# Patient Record
Sex: FEMALE | Race: WHITE | NOT HISPANIC OR LATINO | Employment: FULL TIME | URBAN - METROPOLITAN AREA
[De-identification: names, ages, dates, MRNs, and addresses within clinical notes are randomized per-mention and may not be internally consistent; named-entity substitution may affect disease eponyms.]

---

## 2017-02-13 ENCOUNTER — OFFICE VISIT - HEALTHEAST (OUTPATIENT)
Dept: SLEEP MEDICINE | Facility: CLINIC | Age: 32
End: 2017-02-13

## 2017-02-13 DIAGNOSIS — G47.10 HYPERSOMNIA, UNSPECIFIED: ICD-10-CM

## 2017-02-13 ASSESSMENT — MIFFLIN-ST. JEOR: SCORE: 1575.18

## 2017-07-05 ENCOUNTER — COMMUNICATION - HEALTHEAST (OUTPATIENT)
Dept: ADMINISTRATIVE | Facility: CLINIC | Age: 32
End: 2017-07-05

## 2017-07-13 ENCOUNTER — OFFICE VISIT - HEALTHEAST (OUTPATIENT)
Dept: MIDWIFE SERVICES | Facility: CLINIC | Age: 32
End: 2017-07-13

## 2017-07-13 DIAGNOSIS — N91.2 AMENORRHEA: ICD-10-CM

## 2017-07-13 DIAGNOSIS — Z00.00 HEALTH CARE MAINTENANCE: ICD-10-CM

## 2017-07-13 ASSESSMENT — MIFFLIN-ST. JEOR: SCORE: 1590.15

## 2017-07-14 ENCOUNTER — COMMUNICATION - HEALTHEAST (OUTPATIENT)
Dept: ADMINISTRATIVE | Facility: CLINIC | Age: 32
End: 2017-07-14

## 2017-07-26 ENCOUNTER — COMMUNICATION - HEALTHEAST (OUTPATIENT)
Dept: ADMINISTRATIVE | Facility: CLINIC | Age: 32
End: 2017-07-26

## 2017-07-26 DIAGNOSIS — N93.8 DUB (DYSFUNCTIONAL UTERINE BLEEDING): ICD-10-CM

## 2017-08-08 ENCOUNTER — COMMUNICATION - HEALTHEAST (OUTPATIENT)
Dept: MIDWIFE SERVICES | Facility: CLINIC | Age: 32
End: 2017-08-08

## 2017-10-04 ENCOUNTER — OFFICE VISIT - HEALTHEAST (OUTPATIENT)
Dept: MIDWIFE SERVICES | Facility: CLINIC | Age: 32
End: 2017-10-04

## 2017-10-04 DIAGNOSIS — N63.0 BREAST LUMP IN UPPER OUTER QUADRANT: ICD-10-CM

## 2017-10-04 DIAGNOSIS — F41.9 ANXIETY: ICD-10-CM

## 2017-10-04 ASSESSMENT — MIFFLIN-ST. JEOR: SCORE: 1609.2

## 2017-10-10 ENCOUNTER — HOSPITAL ENCOUNTER (OUTPATIENT)
Dept: MAMMOGRAPHY | Facility: CLINIC | Age: 32
Discharge: HOME | End: 2017-10-10
Attending: ADVANCED PRACTICE MIDWIFE

## 2017-10-10 ENCOUNTER — HOSPITAL ENCOUNTER (OUTPATIENT)
Dept: ULTRASOUND IMAGING | Facility: CLINIC | Age: 32
Discharge: HOME | End: 2017-10-10
Attending: ADVANCED PRACTICE MIDWIFE

## 2017-10-10 DIAGNOSIS — N63.0 BREAST LUMP IN UPPER OUTER QUADRANT: ICD-10-CM

## 2017-10-13 ENCOUNTER — COMMUNICATION - HEALTHEAST (OUTPATIENT)
Dept: MIDWIFE SERVICES | Facility: CLINIC | Age: 32
End: 2017-10-13

## 2017-12-03 ENCOUNTER — HEALTH MAINTENANCE LETTER (OUTPATIENT)
Age: 32
End: 2017-12-03

## 2018-01-24 ENCOUNTER — COMMUNICATION - HEALTHEAST (OUTPATIENT)
Dept: MIDWIFE SERVICES | Facility: CLINIC | Age: 33
End: 2018-01-24

## 2018-01-24 ENCOUNTER — COMMUNICATION - HEALTHEAST (OUTPATIENT)
Dept: ADMINISTRATIVE | Facility: CLINIC | Age: 33
End: 2018-01-24

## 2018-01-24 ENCOUNTER — AMBULATORY - HEALTHEAST (OUTPATIENT)
Dept: MIDWIFE SERVICES | Facility: CLINIC | Age: 33
End: 2018-01-24

## 2018-01-24 DIAGNOSIS — F41.9 ANXIETY: ICD-10-CM

## 2018-02-09 ENCOUNTER — OFFICE VISIT - HEALTHEAST (OUTPATIENT)
Dept: MIDWIFE SERVICES | Facility: CLINIC | Age: 33
End: 2018-02-09

## 2018-02-09 DIAGNOSIS — Z01.419 NORMAL GYNECOLOGIC EXAMINATION: ICD-10-CM

## 2018-02-09 DIAGNOSIS — N60.11 FIBROCYSTIC BREAST CHANGES OF BOTH BREASTS: ICD-10-CM

## 2018-02-09 DIAGNOSIS — F41.9 ANXIETY: ICD-10-CM

## 2018-02-09 DIAGNOSIS — N60.12 FIBROCYSTIC BREAST CHANGES OF BOTH BREASTS: ICD-10-CM

## 2018-02-09 ASSESSMENT — MIFFLIN-ST. JEOR: SCORE: 1596.96

## 2019-02-14 ENCOUNTER — OFFICE VISIT - HEALTHEAST (OUTPATIENT)
Dept: MIDWIFE SERVICES | Facility: CLINIC | Age: 34
End: 2019-02-14

## 2019-02-14 DIAGNOSIS — N92.0 MENORRHAGIA WITH REGULAR CYCLE: ICD-10-CM

## 2019-02-14 DIAGNOSIS — Z01.419 WELL WOMAN EXAM: ICD-10-CM

## 2019-02-14 DIAGNOSIS — F41.9 ANXIETY: ICD-10-CM

## 2019-02-14 DIAGNOSIS — Z12.4 CERVICAL CANCER SCREENING: ICD-10-CM

## 2019-02-14 ASSESSMENT — MIFFLIN-ST. JEOR: SCORE: 1631.88

## 2019-02-15 ENCOUNTER — AMBULATORY - HEALTHEAST (OUTPATIENT)
Dept: LAB | Facility: CLINIC | Age: 34
End: 2019-02-15

## 2019-02-15 DIAGNOSIS — N92.0 MENORRHAGIA WITH REGULAR CYCLE: ICD-10-CM

## 2019-02-15 LAB
HGB BLD-MCNC: 12.3 G/DL (ref 12–16)
HPV SOURCE: NORMAL
HUMAN PAPILLOMA VIRUS 16 DNA: NEGATIVE
HUMAN PAPILLOMA VIRUS 18 DNA: NEGATIVE
HUMAN PAPILLOMA VIRUS FINAL DIAGNOSIS: NORMAL
HUMAN PAPILLOMA VIRUS OTHER HR: NEGATIVE
SPECIMEN DESCRIPTION: NORMAL
TSH SERPL DL<=0.005 MIU/L-ACNC: 1.99 UIU/ML (ref 0.3–5)

## 2019-02-22 LAB
BKR LAB AP ABNORMAL BLEEDING: NO
BKR LAB AP BIRTH CONTROL/HORMONES: NORMAL
BKR LAB AP CERVICAL APPEARANCE: NORMAL
BKR LAB AP GYN ADEQUACY: NORMAL
BKR LAB AP GYN INTERPRETATION: NORMAL
BKR LAB AP HPV REFLEX: NORMAL
BKR LAB AP LMP: NORMAL
BKR LAB AP PATIENT STATUS: NORMAL
BKR LAB AP PREVIOUS ABNORMAL: NO
BKR LAB AP PREVIOUS NORMAL: NORMAL
HIGH RISK?: NO
PATH REPORT.COMMENTS IMP SPEC: NORMAL
RESULT FLAG (HE HISTORICAL CONVERSION): NORMAL

## 2019-03-06 ENCOUNTER — AMBULATORY - HEALTHEAST (OUTPATIENT)
Dept: MIDWIFE SERVICES | Facility: CLINIC | Age: 34
End: 2019-03-06

## 2019-03-06 ENCOUNTER — COMMUNICATION - HEALTHEAST (OUTPATIENT)
Dept: ADMINISTRATIVE | Facility: CLINIC | Age: 34
End: 2019-03-06

## 2019-03-06 DIAGNOSIS — F41.9 ANXIETY: ICD-10-CM

## 2019-03-06 DIAGNOSIS — F41.1 GAD (GENERALIZED ANXIETY DISORDER): ICD-10-CM

## 2019-03-19 ENCOUNTER — COMMUNICATION - HEALTHEAST (OUTPATIENT)
Dept: ADMINISTRATIVE | Facility: CLINIC | Age: 34
End: 2019-03-19

## 2019-06-19 ENCOUNTER — COMMUNICATION - HEALTHEAST (OUTPATIENT)
Dept: ADMINISTRATIVE | Facility: CLINIC | Age: 34
End: 2019-06-19

## 2019-06-19 DIAGNOSIS — Z00.00 HEALTH CARE MAINTENANCE: ICD-10-CM

## 2019-06-21 ENCOUNTER — AMBULATORY - HEALTHEAST (OUTPATIENT)
Dept: LAB | Facility: CLINIC | Age: 34
End: 2019-06-21

## 2019-06-21 DIAGNOSIS — Z00.00 HEALTH CARE MAINTENANCE: ICD-10-CM

## 2019-06-24 LAB
MEV IGG SER IA-ACNC: POSITIVE
MUV IGG SER QL IA: POSITIVE
RUBV IGG SERPL QL IA: POSITIVE

## 2019-06-25 ENCOUNTER — COMMUNICATION - HEALTHEAST (OUTPATIENT)
Dept: OBGYN | Facility: CLINIC | Age: 34
End: 2019-06-25

## 2020-01-06 ENCOUNTER — OFFICE VISIT - HEALTHEAST (OUTPATIENT)
Dept: MIDWIFE SERVICES | Facility: CLINIC | Age: 35
End: 2020-01-06

## 2020-01-06 DIAGNOSIS — Z01.812 PRE-PROCEDURE LAB EXAM: ICD-10-CM

## 2020-01-06 DIAGNOSIS — Z30.430 ENCOUNTER FOR IUD INSERTION: ICD-10-CM

## 2020-01-06 LAB — HCG UR QL: NEGATIVE

## 2020-01-06 ASSESSMENT — MIFFLIN-ST. JEOR: SCORE: 1654.56

## 2020-01-07 LAB
C TRACH DNA SPEC QL PROBE+SIG AMP: NEGATIVE
N GONORRHOEA DNA SPEC QL NAA+PROBE: NEGATIVE

## 2020-02-03 ENCOUNTER — OFFICE VISIT - HEALTHEAST (OUTPATIENT)
Dept: MIDWIFE SERVICES | Facility: CLINIC | Age: 35
End: 2020-02-03

## 2020-02-03 DIAGNOSIS — Z97.5 BREAKTHROUGH BLEEDING ASSOCIATED WITH INTRAUTERINE DEVICE (IUD): ICD-10-CM

## 2020-02-03 DIAGNOSIS — N92.1 BREAKTHROUGH BLEEDING ASSOCIATED WITH INTRAUTERINE DEVICE (IUD): ICD-10-CM

## 2020-02-03 DIAGNOSIS — Z30.431 INTRAUTERINE DEVICE SURVEILLANCE: ICD-10-CM

## 2020-02-03 ASSESSMENT — MIFFLIN-ST. JEOR: SCORE: 1654.56

## 2020-02-17 ENCOUNTER — HOSPITAL ENCOUNTER (OUTPATIENT)
Dept: ULTRASOUND IMAGING | Facility: HOSPITAL | Age: 35
Discharge: HOME | End: 2020-02-17
Attending: ADVANCED PRACTICE MIDWIFE

## 2020-02-17 DIAGNOSIS — N92.1 BREAKTHROUGH BLEEDING ASSOCIATED WITH INTRAUTERINE DEVICE (IUD): ICD-10-CM

## 2020-02-17 DIAGNOSIS — Z97.5 BREAKTHROUGH BLEEDING ASSOCIATED WITH INTRAUTERINE DEVICE (IUD): ICD-10-CM

## 2020-02-20 ENCOUNTER — OFFICE VISIT - HEALTHEAST (OUTPATIENT)
Dept: MIDWIFE SERVICES | Facility: CLINIC | Age: 35
End: 2020-02-20

## 2020-02-20 ENCOUNTER — COMMUNICATION - HEALTHEAST (OUTPATIENT)
Dept: MIDWIFE SERVICES | Facility: CLINIC | Age: 35
End: 2020-02-20

## 2020-02-20 DIAGNOSIS — F41.1 GAD (GENERALIZED ANXIETY DISORDER): ICD-10-CM

## 2020-02-20 DIAGNOSIS — Z01.419 WELL WOMAN EXAM: ICD-10-CM

## 2020-02-20 DIAGNOSIS — F41.9 ANXIETY: ICD-10-CM

## 2020-02-20 DIAGNOSIS — Z30.431 ENCOUNTER FOR ROUTINE CHECKING OF INTRAUTERINE CONTRACEPTIVE DEVICE (IUD): ICD-10-CM

## 2020-02-20 ASSESSMENT — MIFFLIN-ST. JEOR: SCORE: 1672.71

## 2020-03-02 ENCOUNTER — HEALTH MAINTENANCE LETTER (OUTPATIENT)
Age: 35
End: 2020-03-02

## 2020-05-26 ENCOUNTER — COMMUNICATION - HEALTHEAST (OUTPATIENT)
Dept: ADMINISTRATIVE | Facility: CLINIC | Age: 35
End: 2020-05-26

## 2020-06-23 ENCOUNTER — OFFICE VISIT - HEALTHEAST (OUTPATIENT)
Dept: MIDWIFE SERVICES | Facility: CLINIC | Age: 35
End: 2020-06-23

## 2020-06-23 DIAGNOSIS — N89.8 VAGINAL ODOR: ICD-10-CM

## 2020-06-23 DIAGNOSIS — Z30.431 IUD CHECK UP: ICD-10-CM

## 2020-06-23 LAB
CLUE CELLS: NORMAL
TRICHOMONAS, WET PREP: NORMAL
YEAST, WET PREP: NORMAL

## 2020-06-23 ASSESSMENT — MIFFLIN-ST. JEOR: SCORE: 1672.71

## 2020-12-20 ENCOUNTER — HEALTH MAINTENANCE LETTER (OUTPATIENT)
Age: 35
End: 2020-12-20

## 2021-01-22 ENCOUNTER — AMBULATORY - HEALTHEAST (OUTPATIENT)
Dept: NURSING | Facility: CLINIC | Age: 36
End: 2021-01-22

## 2021-02-10 ENCOUNTER — COMMUNICATION - HEALTHEAST (OUTPATIENT)
Dept: MIDWIFE SERVICES | Facility: CLINIC | Age: 36
End: 2021-02-10

## 2021-02-12 ENCOUNTER — AMBULATORY - HEALTHEAST (OUTPATIENT)
Dept: NURSING | Facility: CLINIC | Age: 36
End: 2021-02-12

## 2021-03-01 ENCOUNTER — OFFICE VISIT - HEALTHEAST (OUTPATIENT)
Dept: OTOLARYNGOLOGY | Facility: CLINIC | Age: 36
End: 2021-03-01

## 2021-03-01 DIAGNOSIS — Z87.898 HISTORY OF CHRONIC COUGH: ICD-10-CM

## 2021-03-16 ENCOUNTER — TRANSCRIBE ORDERS (OUTPATIENT)
Dept: OTOLARYNGOLOGY | Facility: CLINIC | Age: 36
End: 2021-03-16

## 2021-03-16 ENCOUNTER — AMBULATORY - HEALTHEAST (OUTPATIENT)
Dept: LAB | Facility: CLINIC | Age: 36
End: 2021-03-16

## 2021-03-16 DIAGNOSIS — Z87.898 HISTORY OF CHRONIC COUGH: Primary | ICD-10-CM

## 2021-03-24 ENCOUNTER — OFFICE VISIT - HEALTHEAST (OUTPATIENT)
Dept: FAMILY MEDICINE | Facility: CLINIC | Age: 36
End: 2021-03-24

## 2021-03-24 ENCOUNTER — RECORDS - HEALTHEAST (OUTPATIENT)
Dept: GENERAL RADIOLOGY | Facility: CLINIC | Age: 36
End: 2021-03-24

## 2021-03-24 DIAGNOSIS — T78.1XXD OTHER ADVERSE FOOD REACTIONS, NOT ELSEWHERE CLASSIFIED, SUBSEQUENT ENCOUNTER: ICD-10-CM

## 2021-03-24 DIAGNOSIS — Z00.00 ROUTINE GENERAL MEDICAL EXAMINATION AT A HEALTH CARE FACILITY: ICD-10-CM

## 2021-03-24 DIAGNOSIS — Z12.4 SCREENING FOR MALIGNANT NEOPLASM OF CERVIX: ICD-10-CM

## 2021-03-24 DIAGNOSIS — Z13.220 LIPID SCREENING: ICD-10-CM

## 2021-03-24 DIAGNOSIS — Z97.5 BREAKTHROUGH BLEEDING ASSOCIATED WITH INTRAUTERINE DEVICE (IUD): ICD-10-CM

## 2021-03-24 DIAGNOSIS — E66.811 CLASS 1 OBESITY WITHOUT SERIOUS COMORBIDITY WITH BODY MASS INDEX (BMI) OF 33.0 TO 33.9 IN ADULT, UNSPECIFIED OBESITY TYPE: ICD-10-CM

## 2021-03-24 DIAGNOSIS — N92.1 BREAKTHROUGH BLEEDING ASSOCIATED WITH INTRAUTERINE DEVICE (IUD): ICD-10-CM

## 2021-03-24 LAB
ALBUMIN SERPL-MCNC: 3.8 G/DL (ref 3.5–5)
ALP SERPL-CCNC: 55 U/L (ref 45–120)
ALT SERPL W P-5'-P-CCNC: 11 U/L (ref 0–45)
ANION GAP SERPL CALCULATED.3IONS-SCNC: 8 MMOL/L (ref 5–18)
AST SERPL W P-5'-P-CCNC: 19 U/L (ref 0–40)
BILIRUB SERPL-MCNC: 0.5 MG/DL (ref 0–1)
BUN SERPL-MCNC: 11 MG/DL (ref 8–22)
CALCIUM SERPL-MCNC: 8.7 MG/DL (ref 8.5–10.5)
CHLORIDE BLD-SCNC: 108 MMOL/L (ref 98–107)
CHOLEST SERPL-MCNC: 151 MG/DL
CLUE CELLS: NORMAL
CO2 SERPL-SCNC: 24 MMOL/L (ref 22–31)
CREAT SERPL-MCNC: 0.75 MG/DL (ref 0.6–1.1)
ERYTHROCYTE [DISTWIDTH] IN BLOOD BY AUTOMATED COUNT: 12.3 % (ref 11–14.5)
FASTING STATUS PATIENT QL REPORTED: NO
GFR SERPL CREATININE-BSD FRML MDRD: >60 ML/MIN/1.73M2
GLUCOSE BLD-MCNC: 73 MG/DL (ref 70–125)
HCT VFR BLD AUTO: 42.1 % (ref 35–47)
HDLC SERPL-MCNC: 51 MG/DL
HGB BLD-MCNC: 13.9 G/DL (ref 12–16)
LDLC SERPL CALC-MCNC: 86 MG/DL
MCH RBC QN AUTO: 31.6 PG (ref 27–34)
MCHC RBC AUTO-ENTMCNC: 33 G/DL (ref 32–36)
MCV RBC AUTO: 96 FL (ref 80–100)
PLATELET # BLD AUTO: 212 THOU/UL (ref 140–440)
PMV BLD AUTO: 9.9 FL (ref 7–10)
POTASSIUM BLD-SCNC: 4.2 MMOL/L (ref 3.5–5)
PROT SERPL-MCNC: 6.6 G/DL (ref 6–8)
RBC # BLD AUTO: 4.4 MILL/UL (ref 3.8–5.4)
SODIUM SERPL-SCNC: 140 MMOL/L (ref 136–145)
TRICHOMONAS, WET PREP: NORMAL
TRIGL SERPL-MCNC: 71 MG/DL
WBC: 5.2 THOU/UL (ref 4–11)
YEAST, WET PREP: NORMAL

## 2021-03-24 ASSESSMENT — MIFFLIN-ST. JEOR: SCORE: 1690.85

## 2021-03-25 LAB
HPV SOURCE: NORMAL
HUMAN PAPILLOMA VIRUS 16 DNA: NEGATIVE
HUMAN PAPILLOMA VIRUS 18 DNA: NEGATIVE
HUMAN PAPILLOMA VIRUS FINAL DIAGNOSIS: NORMAL
HUMAN PAPILLOMA VIRUS OTHER HR: NEGATIVE
SPECIMEN DESCRIPTION: NORMAL

## 2021-03-30 ENCOUNTER — COMMUNICATION - HEALTHEAST (OUTPATIENT)
Dept: FAMILY MEDICINE | Facility: CLINIC | Age: 36
End: 2021-03-30

## 2021-04-01 ENCOUNTER — COMMUNICATION - HEALTHEAST (OUTPATIENT)
Dept: FAMILY MEDICINE | Facility: CLINIC | Age: 36
End: 2021-04-01

## 2021-04-01 LAB
BKR LAB AP ABNORMAL BLEEDING: YES
BKR LAB AP BIRTH CONTROL/HORMONES: NORMAL
BKR LAB AP CERVICAL APPEARANCE: NORMAL
BKR LAB AP GYN ADEQUACY: NORMAL
BKR LAB AP GYN INTERPRETATION: NORMAL
BKR LAB AP HPV REFLEX: NORMAL
BKR LAB AP LMP: NORMAL
BKR LAB AP PATIENT STATUS: NORMAL
BKR LAB AP PREVIOUS ABNORMAL: NO
BKR LAB AP PREVIOUS NORMAL: 2019
HIGH RISK?: NO
PATH REPORT.COMMENTS IMP SPEC: NORMAL
RESULT FLAG (HE HISTORICAL CONVERSION): NORMAL

## 2021-04-06 NOTE — TELEPHONE ENCOUNTER
REFERRAL INFORMATION:    Referring Provider:  Dr. Davis Goldsmith    Referring Clinic:  [facility]    Reason for Visit/Diagnosis: GERD     FUTURE VISIT INFORMATION:    Appointment Date: 4/27/2021    Appointment Time: Noon     NOTES STATUS DETAILS   OFFICE NOTE from Referring Provider In process [facility]   OFFICE NOTE from Other Specialist Care Everywhere [facility]:  3/1/21 - ENT OV with Dr. Goldsmith    [facility]:  12/17/16 - UC OV with Dr. Cherry   [facility] DISCHARGE SUMMARY/  ED VISITS In Process [facility]:  3/13/16 - ED OV with PADMINI Rick   OPERATIVE REPORT In process    MEDICATION LIST Care Everywhere         ENDOSCOPY  In process    COLONOSCOPY In process    ERCP In process    EUS In process    STOOL TESTING In process    PERTINENT LABS In process    PATHOLOGY REPORTS (RELATED) In process    IMAGING (CT, MRI, EGD, MRCP, Small Bowel Follow Through/SBT, MR/CT Enterography) In process      Records Requested  04/06/21    Facility  [facility]  Fax: [phone]   Outcome * 4/6/21 4:37 PM Faxed req to [facility] for records to be faxed to clinic. - Kirsten     4/21/2021 9:25am Fax request sent to [facility] ([phone]) for med recs. -Jacinta     4/26/2021 11:46am Fax request sent to [facility] for med recs. Jerad

## 2021-04-15 ENCOUNTER — COMMUNICATION - HEALTHEAST (OUTPATIENT)
Dept: FAMILY MEDICINE | Facility: CLINIC | Age: 36
End: 2021-04-15

## 2021-04-15 DIAGNOSIS — N92.1 BREAKTHROUGH BLEEDING ASSOCIATED WITH INTRAUTERINE DEVICE (IUD): ICD-10-CM

## 2021-04-15 DIAGNOSIS — Z97.5 BREAKTHROUGH BLEEDING ASSOCIATED WITH INTRAUTERINE DEVICE (IUD): ICD-10-CM

## 2021-04-24 ENCOUNTER — HEALTH MAINTENANCE LETTER (OUTPATIENT)
Age: 36
End: 2021-04-24

## 2021-04-27 ENCOUNTER — VIRTUAL VISIT (OUTPATIENT)
Dept: GASTROENTEROLOGY | Facility: CLINIC | Age: 36
End: 2021-04-27
Payer: COMMERCIAL

## 2021-04-27 ENCOUNTER — PRE VISIT (OUTPATIENT)
Dept: GASTROENTEROLOGY | Facility: CLINIC | Age: 36
End: 2021-04-27

## 2021-04-27 VITALS — BODY MASS INDEX: 31.39 KG/M2 | WEIGHT: 200 LBS | HEIGHT: 67 IN

## 2021-04-27 DIAGNOSIS — K21.9 GASTROESOPHAGEAL REFLUX DISEASE, UNSPECIFIED WHETHER ESOPHAGITIS PRESENT: ICD-10-CM

## 2021-04-27 DIAGNOSIS — R10.13 ABDOMINAL PAIN, EPIGASTRIC: ICD-10-CM

## 2021-04-27 DIAGNOSIS — R19.5 LOOSE STOOLS: Primary | ICD-10-CM

## 2021-04-27 DIAGNOSIS — Z87.898 HISTORY OF CHRONIC COUGH: ICD-10-CM

## 2021-04-27 PROCEDURE — 99204 OFFICE O/P NEW MOD 45 MIN: CPT | Mod: 95 | Performed by: PHYSICIAN ASSISTANT

## 2021-04-27 ASSESSMENT — PAIN SCALES - GENERAL: PAINLEVEL: NO PAIN (0)

## 2021-04-27 ASSESSMENT — MIFFLIN-ST. JEOR: SCORE: 1639.82

## 2021-04-27 NOTE — LETTER
4/27/2021         RE: Komal Parra  [address] [address]        Dear Colleague,    Thank you for referring your patient, Komal Parra, to the [facility] [place]. Please see a copy of my visit note below.    GI CLINIC VISIT    CC/REFERRING MD:  Referred Self  REASON FOR CONSULTATION: chronic cough    ASSESSMENT/PLAN:  1. Chronic cough   Patient reports 2 years of chronic cough certain foods including lettuce, certain types of wheat.  Has been seen by ENT and allergy without a source of symptoms.  Does have significant family history of GERD and father with esophageal cancer.  Has done a 2-month trial of PPI without improvement in symptoms.  Would recommend evaluation with EGD with biopsies for eosinophilic esophagitis.  Can also have the Bravo procedure to evaluate for presence of GERD.      Given longstanding history of loose stools and abdominal discomfort, would recommend biopsies for H. pylori and celiac disease.  --Upper endoscopy with Bravo procedure    RTC 6-8 weeks     Thank you for this consultation.  It was a pleasure to participate in the care of this patient; please contact us with any further questions.     45 Minutes was spent on the date of the encounter during chart review, history and exam, documentation, and further activities as noted       Stephanie Hernandez PA-C  Division of Gastroenterology, Hepatology & Nutrition  [facility]      HPI  Komal Parra is a 35 year old presenting for chronic cough. Patient reports a chronic cough for 2 years. Symptoms do seem to be getting worse with time.     She has previously been seen by an allergist ([facility] in [place]- gluten, lettuce, preservative allergies were negative) and an ENT, a dietitian also suggested yeast overgrowth. Also notes lettuce seems to be a trigger. She notes that symptoms went away for a while (early April for 2 weeks), but that they came  "back \"with a vengence\". Symptoms seem to be year round. She also describes sensation of \"lump in her throat\" when she swallows that feels \"mucusy\". She states this is dry cough- and can last for hours after eating. When she coughs, she does not necessarily feal better but she always has this urge to cough. She has been on PPI with pantoprazole which did not help after two months, Flonase did not help. She does get some GERD symptoms related to food triggers. She occasionally will have dysphagia to solid foods if she does not chew properly. No dysphagia to liquids.  Foods that are high in wheat seem to trigger symptoms. She reports that she can typically tolerate white bread, but what bread can make things worse.     She notes that her dad had really severe GERD and diet of esophageal cancer, her sister had severe GERD as well but this improved.     She does note SOB- and feels like she cannot breathe. She will try taking an inhaler- does not make much of a difference. She is able to run without coughing, but with other types of workouts can trigger symptoms.     Notes IBS- cannot eat fried foods, has had this for 20 years. In the beginning of April she was constipated, and she was not coughing during this time. Can be up to four times a day. Will have urgency with bowel movements.     ROS:    No fevers or chills  No weight loss  No blurry vision, double vision or change in vision  No sore throat  No lymphadenopathy  No headache, paraesthesias, or weakness in a limb  + shortness of breath or wheezing  No chest pain or pressure  No arthralgias or myalgias  No rashes or skin changes + bruise easily   +  dysphagia  No BRBPR, hematochezia, melena  No dysuria, frequency or urgency  No hot/cold intolerance or polyria  + anxiety    PROBLEM LIST  Patient Active Problem List    Diagnosis Date Noted     Indication for care in labor or delivery 01/17/2014     Priority: Medium       PERTINENT PAST MEDICAL HISTORY:  Anxiety "     PREVIOUS SURGERIES:  Past Surgical History:   Procedure Laterality Date     HC TOOTH EXTRACTION W/FORCEP  2002       PREVIOUS ENDOSCOPY:  None     ALLERGIES:     Allergies   Allergen Reactions     Ibuprofen GI Disturbance       PERTINENT MEDICATIONS:    Current Outpatient Medications:      CITALOPRAM HYDROBROMIDE PO, Take 20 mg by mouth, Disp: , Rfl:      Prenatal Vit-Fe Fumarate-FA (PRENATAL MULTIVITAMIN  PLUS IRON) 27-0.8 MG TABS, Take 1 tablet by mouth daily, Disp: , Rfl:    Avoids NSAIDs     SOCIAL HISTORY:  No alcohol use or drug use   Social History     Socioeconomic History     Marital status:      Spouse name: Not on file     Number of children: Not on file     Years of education: Not on file     Highest education level: Not on file   Occupational History     Not on file   Social Needs     Financial resource strain: Not on file     Food insecurity     Worry: Not on file     Inability: Not on file     Transportation needs     Medical: Not on file     Non-medical: Not on file   Tobacco Use     Smoking status: Never Smoker     Smokeless tobacco: Never Used   Substance and Sexual Activity     Alcohol use: No     Drug use: No     Sexual activity: Yes     Partners: Male     Birth control/protection: None   Lifestyle     Physical activity     Days per week: Not on file     Minutes per session: Not on file     Stress: Not on file   Relationships     Social connections     Talks on phone: Not on file     Gets together: Not on file     Attends Zoroastrianism service: Not on file     Active member of club or organization: Not on file     Attends meetings of clubs or organizations: Not on file     Relationship status: Not on file     Intimate partner violence     Fear of current or ex partner: Not on file     Emotionally abused: Not on file     Physically abused: Not on file     Forced sexual activity: Not on file   Other Topics Concern     Parent/sibling w/ CABG, MI or angioplasty before 65F 55M? Not Asked  "  Social History Narrative     Not on file       FAMILY HISTORY:  FH of CRC: none   Mother with pre-cancerous polyps   FH of IBD: none   Dad with bad reflux and esophageal cancer     Past/family/social history reviewed and no changes    PHYSICAL EXAMINATION:  General appearance: Healthy appearing adult, in no acute distress  Eyes: Sclera anicteric  Ears, nose, mouth and throat: No obvious external lesions of ears and nose, Hearing intact  Neck: Symmetric, No obvious external lesions  Respiratory: Normal respiration, no use of accessory muscles   Skin: No rashes or jaundice   Psychiatric: Oriented to person, place and time, Appropriate mood and affect.     PERTINENT STUDIES:  No results found for any previous visit.           Komal Parra is a 35 year old female who is being evaluated via a billable video visit.      The patient has been notified of following:     \"This video visit will be conducted via a call between you and your physician/provider. We have found that certain health care needs can be provided without the need for an in-person physical exam.  This service lets us provide the care you need with a video conversation.  If a prescription is necessary we can send it directly to your pharmacy.  If lab work is needed we can place an order for that and you can then stop by our lab to have the test done at a later time.    If during the course of the call the physician/provider feels a video visit is not appropriate, you will not be charged for this service.\"     Patient confirmed that they are in [place] for today's visit Yes    Video-Visit Details  Type of service:  Video Visit    Video Start Time: 12:07 PM  Video End Time:  12:46 PM    Originating Location (pt. Location): Home    Distant Location (provider location):  [facility] [place]     Platform used: Foxtrot- patient's audio was not working, therefore the phone was used for audio today       Again, thank you " for allowing me to participate in the care of your patient.      Sincerely,    Stephanie Hernandez PA-C

## 2021-04-27 NOTE — PROGRESS NOTES
"Komal Parra is a 35 year old female who is being evaluated via a billable video visit.      The patient has been notified of following:     \"This video visit will be conducted via a call between you and your physician/provider. We have found that certain health care needs can be provided without the need for an in-person physical exam.  This service lets us provide the care you need with a video conversation.  If a prescription is necessary we can send it directly to your pharmacy.  If lab work is needed we can place an order for that and you can then stop by our lab to have the test done at a later time.    If during the course of the call the physician/provider feels a video visit is not appropriate, you will not be charged for this service.\"     Patient confirmed that they are in [place] for today's visit Yes    Video-Visit Details  Type of service:  Video Visit    Video Start Time: 12:07 PM  Video End Time:  12:46 PM    Originating Location (pt. Location): Home    Distant Location (provider location):  [facility] [place]     Platform used: [employer]- patient's audio was not working, therefore the phone was used for audio today             "

## 2021-04-27 NOTE — PROGRESS NOTES
"GI CLINIC VISIT    CC/REFERRING MD:  Referred Self  REASON FOR CONSULTATION: chronic cough    ASSESSMENT/PLAN:  1. Chronic cough   Patient reports 2 years of chronic cough certain foods including lettuce, certain types of wheat.  Has been seen by ENT and allergy without a source of symptoms.  Does have significant family history of GERD and father with esophageal cancer.  Has done a 2-month trial of PPI without improvement in symptoms.  Would recommend evaluation with EGD with biopsies for eosinophilic esophagitis.  Can also have the Bravo procedure to evaluate for presence of GERD.      Given longstanding history of loose stools and abdominal discomfort, would recommend biopsies for H. pylori and celiac disease.  --Upper endoscopy with Bravo procedure    RTC 6-8 weeks     Thank you for this consultation.  It was a pleasure to participate in the care of this patient; please contact us with any further questions.     45 Minutes was spent on the date of the encounter during chart review, history and exam, documentation, and further activities as noted       Stephanie Hernandez PA-C  Division of Gastroenterology, Hepatology & Nutrition  [facility]  Komal Parra is a 35 year old presenting for chronic cough. Patient reports a chronic cough for 2 years. Symptoms do seem to be getting worse with time.     She has previously been seen by an allergist ([facility] in [place]- gluten, lettuce, preservative allergies were negative) and an ENT, a dietitian also suggested yeast overgrowth. Also notes lettuce seems to be a trigger. She notes that symptoms went away for a while (early April for 2 weeks), but that they came back \"with a vengence\". Symptoms seem to be year round. She also describes sensation of \"lump in her throat\" when she swallows that feels \"mucusy\". She states this is dry cough- and can last for hours after eating. When she coughs, she does not necessarily feal better but she " always has this urge to cough. She has been on PPI with pantoprazole which did not help after two months, Flonase did not help. She does get some GERD symptoms related to food triggers. She occasionally will have dysphagia to solid foods if she does not chew properly. No dysphagia to liquids.  Foods that are high in wheat seem to trigger symptoms. She reports that she can typically tolerate white bread, but what bread can make things worse.     She notes that her dad had really severe GERD and diet of esophageal cancer, her sister had severe GERD as well but this improved.     She does note SOB- and feels like she cannot breathe. She will try taking an inhaler- does not make much of a difference. She is able to run without coughing, but with other types of workouts can trigger symptoms.     Notes IBS- cannot eat fried foods, has had this for 20 years. In the beginning of April she was constipated, and she was not coughing during this time. Can be up to four times a day. Will have urgency with bowel movements.     ROS:    No fevers or chills  No weight loss  No blurry vision, double vision or change in vision  No sore throat  No lymphadenopathy  No headache, paraesthesias, or weakness in a limb  + shortness of breath or wheezing  No chest pain or pressure  No arthralgias or myalgias  No rashes or skin changes + bruise easily   +  dysphagia  No BRBPR, hematochezia, melena  No dysuria, frequency or urgency  No hot/cold intolerance or polyria  + anxiety    PROBLEM LIST  Patient Active Problem List    Diagnosis Date Noted     Indication for care in labor or delivery 01/17/2014     Priority: Medium       PERTINENT PAST MEDICAL HISTORY:  Anxiety     PREVIOUS SURGERIES:  Past Surgical History:   Procedure Laterality Date      TOOTH EXTRACTION W/FORCEP  2002       PREVIOUS ENDOSCOPY:  None     ALLERGIES:     Allergies   Allergen Reactions     Ibuprofen GI Disturbance       PERTINENT MEDICATIONS:    Current Outpatient  Medications:      CITALOPRAM HYDROBROMIDE PO, Take 20 mg by mouth, Disp: , Rfl:      Prenatal Vit-Fe Fumarate-FA (PRENATAL MULTIVITAMIN  PLUS IRON) 27-0.8 MG TABS, Take 1 tablet by mouth daily, Disp: , Rfl:    Avoids NSAIDs     SOCIAL HISTORY:  No alcohol use or drug use   Social History     Socioeconomic History     Marital status:      Spouse name: Not on file     Number of children: Not on file     Years of education: Not on file     Highest education level: Not on file   Occupational History     Not on file   Social Needs     Financial resource strain: Not on file     Food insecurity     Worry: Not on file     Inability: Not on file     Transportation needs     Medical: Not on file     Non-medical: Not on file   Tobacco Use     Smoking status: Never Smoker     Smokeless tobacco: Never Used   Substance and Sexual Activity     Alcohol use: No     Drug use: No     Sexual activity: Yes     Partners: Male     Birth control/protection: None   Lifestyle     Physical activity     Days per week: Not on file     Minutes per session: Not on file     Stress: Not on file   Relationships     Social connections     Talks on phone: Not on file     Gets together: Not on file     Attends Yazdanism service: Not on file     Active member of club or organization: Not on file     Attends meetings of clubs or organizations: Not on file     Relationship status: Not on file     Intimate partner violence     Fear of current or ex partner: Not on file     Emotionally abused: Not on file     Physically abused: Not on file     Forced sexual activity: Not on file   Other Topics Concern     Parent/sibling w/ CABG, MI or angioplasty before 65F 55M? Not Asked   Social History Narrative     Not on file       FAMILY HISTORY:  FH of CRC: none   Mother with pre-cancerous polyps   FH of IBD: none   Dad with bad reflux and esophageal cancer     Past/family/social history reviewed and no changes    PHYSICAL EXAMINATION:  General appearance:  Healthy appearing adult, in no acute distress  Eyes: Sclera anicteric  Ears, nose, mouth and throat: No obvious external lesions of ears and nose, Hearing intact  Neck: Symmetric, No obvious external lesions  Respiratory: Normal respiration, no use of accessory muscles   Skin: No rashes or jaundice   Psychiatric: Oriented to person, place and time, Appropriate mood and affect.     PERTINENT STUDIES:  No results found for any previous visit.

## 2021-04-27 NOTE — NURSING NOTE
"Chief Complaint   Patient presents with     Consult     Virtual consult       Vitals:    04/27/21 1135   Weight: 90.7 kg (200 lb)   Height: 1.702 m (5' 7\")       Body mass index is 31.32 kg/m .      EVIN WelshT                      "

## 2021-04-27 NOTE — PATIENT INSTRUCTIONS
It was a pleasure taking care of you today.  I've included a brief summary of our discussion and care plan from today's visit below.  Please review this information with your primary care provider.  ______________________________________________________________________    My recommendations are summarized as follows:    -- upper endoscopy with BRAVO  -- please see scheduling information provided below     Return to GI Clinic in 6-8 weeks to review your progress.    ______________________________________________________________________    How do I schedule labs, imaging studies, or procedures that were ordered in clinic today?     Labs: To schedule lab appointment at the [facility], use my chart or call [phone]. If you have a [facility] lab closer to home where you are regularly seen you can give them a call.     Procedures: If a colonoscopy, upper endoscopy, breath test, esophageal manometry, or pH impedence was ordered today, our endoscopy team will call you to schedule this. If you have not heard from our endoscopy team within a week, please call [phone] to schedule.     Imaging Studies: If you were scheduled for a CT scan, X-ray, MRI, ultrasound, HIDA scan or other imaging study, please call [phone] to have this scheduled.     Referral: If a referral to another specialty was ordered, expect a phone call or follow instructions above. If you have not heard from anyone regarding your referral in a week, please call our clinic to check the status.     Who do I call with any questions after my visit?  Please be in touch if there are any further questions that arise following today's visit.  There are multiple ways to contact your gastroenterology care team.        During business hours, you may reach a Gastroenterology nurse at [phone]      To schedule or reschedule an appointment, please call [phone].       You can always send a secure message through [employer].  Pristones  are answered by your nurse or doctor typically within 24 hours.  Please allow extra time on weekends and holidays.        For urgent/emergent questions after business hours, you may reach the on-call GI Fellow by contacting the [facility]  at [phone].     How will I get the results of any tests ordered?    You will receive all of your results.  If you have signed up for [employer], any tests ordered at your visit will be available to you after your physician reviews them.  Typically this takes 1-2 weeks.  If there are urgent results that require a change in your care plan, your physician or nurse will call you to discuss the next steps.      What is Cerebrex?  Cerebrex is a secure way for you to access all of your healthcare records from the [facility].  It is a web based computer program, so you can sign on to it from any location.  It also allows you to send secure messages to your care team.  I recommend signing up for Cerebrex access if you have not already done so and are comfortable with using a computer.      How to I schedule a follow-up visit?  If you did not schedule a follow-up visit today, please call [phone] to schedule a follow-up office visit.      Sincerely,    Stephanie Hernandez PA-C  Division of Gastroenterology, Hepatology & Nutrition  [facility]

## 2021-05-05 ENCOUNTER — TELEPHONE (OUTPATIENT)
Dept: GASTROENTEROLOGY | Facility: OUTPATIENT CENTER | Age: 36
End: 2021-05-05

## 2021-05-05 NOTE — TELEPHONE ENCOUNTER
"Screening Questions  1. What insurance is in the chart? [employer]    2.  Ordering/Referring Provider: Stephanie Hernandez    3. BMI 5'7\"/200=31.3    4. Are you on daily home oxygen? n    5. Do you have a history of difficult airway? n    6. Have you had a heart, lung, or liver transplant? n    7. Have you had a stroke or Transient ischemic atttack (TIA) within 3 months? n    8. In the past year, have you had any heart related issues including cardiomyopathy or a MI within 6 months, that required cardiac stenting or other implantable devices? n    9. What type of implantable device do you have (any pacemaker, defib, LVAD)? n    10. Do you take nitroglycerin? If yes, how often? n    11. Are you currently taking any blood thinners?n    12. Are you a diabetic? n    13. (Females) Are you currently pregnant? N  If yes, how many weeks?    14. Have you had a procedure in the past that was difficult to tolerate with conscious sedation? Any allergies to Fentanyl or Versed N    15. Are you taking any scheduled prescription narcotics more than once daily? N    16. Do you have any chemical dependencies such as alcohol, street drugs, or methadone? N    17. Do you have any history of post-traumatic stress syndrome or mental health issues? ANXIETY    18. Do you transfer independently? y    19.  Do you have any issues with constipation? IVF BUT NOT REALLY CONSTIPATION    Scheduling Details    Procedure Scheduled: EGD W/ BRAVO  Provider/Surgeon: SALINA  Date of Procedure: 5/20  Location: [facility]  Caller (Please ask for phone number if not scheduled by patient): PATIENT      Sedation Type: MAC  Conscious Sedation- Needs  for 6 hours after the procedure  MAC/General-Needs  for 24 hours after procedure    Pre-op Required at [facility] and OR for  MAC sedation: N  (if yes advise patient they will need a pre-op prior to procedure)      Is patient on blood thinners? -N (If yes- inform patient to follow up with PCP or provider for " follow up instructions)     Informed patient they will need an adult  Y  Cannot take any type of public or medical transportation alone    Informed Patient of COVID Test Requirement Y-She will do it herself    Preferred Pharmacy for Pre Prescription     Confirmed Nurse will call to complete assessment     Additional comments:

## 2021-05-07 DIAGNOSIS — Z11.59 ENCOUNTER FOR SCREENING FOR OTHER VIRAL DISEASES: ICD-10-CM

## 2021-05-12 ENCOUNTER — TELEPHONE (OUTPATIENT)
Dept: GASTROENTEROLOGY | Facility: CLINIC | Age: 36
End: 2021-05-12

## 2021-05-12 NOTE — TELEPHONE ENCOUNTER
Writer reviewed pre-assessment questions with patient prior to upcoming EGD with Bravo on 5.20.2021.  COVID test scheduled for 5.17.2021 through [facility] at [address] [place] [address].    Pre-op:  5.17.2021 with Dr. Megan Mack at the [facility]    Reviewed EGD Bravo prep instructions with patient.      Designated  policy reviewed with patient.     Patient verbalized understanding.  No further questions or concerns.      Pt instructed to stop PPI 1 week prior to procedure; any antacids (Tums, Maalox) stop 3 days prior

## 2021-05-17 ENCOUNTER — AMBULATORY - HEALTHEAST (OUTPATIENT)
Dept: LAB | Facility: CLINIC | Age: 36
End: 2021-05-17

## 2021-05-17 ENCOUNTER — OFFICE VISIT - HEALTHEAST (OUTPATIENT)
Dept: FAMILY MEDICINE | Facility: CLINIC | Age: 36
End: 2021-05-17

## 2021-05-17 DIAGNOSIS — R05.3 CHRONIC COUGH: ICD-10-CM

## 2021-05-17 DIAGNOSIS — Z11.59 ENCOUNTER FOR SCREENING FOR OTHER VIRAL DISEASES: ICD-10-CM

## 2021-05-17 DIAGNOSIS — Z01.818 PREOP GENERAL PHYSICAL EXAM: ICD-10-CM

## 2021-05-17 DIAGNOSIS — E66.811 CLASS 1 OBESITY WITHOUT SERIOUS COMORBIDITY WITH BODY MASS INDEX (BMI) OF 33.0 TO 33.9 IN ADULT, UNSPECIFIED OBESITY TYPE: ICD-10-CM

## 2021-05-17 DIAGNOSIS — F41.9 ANXIETY: ICD-10-CM

## 2021-05-17 ASSESSMENT — MIFFLIN-ST. JEOR: SCORE: 1668.17

## 2021-05-18 ENCOUNTER — COMMUNICATION - HEALTHEAST (OUTPATIENT)
Dept: SCHEDULING | Facility: CLINIC | Age: 36
End: 2021-05-18

## 2021-05-18 LAB
SARS-COV-2 PCR COMMENT: NORMAL
SARS-COV-2 RNA SPEC QL NAA+PROBE: NEGATIVE
SARS-COV-2 VIRUS SPECIMEN SOURCE: NORMAL

## 2021-05-20 ENCOUNTER — ANESTHESIA EVENT (OUTPATIENT)
Dept: GASTROENTEROLOGY | Facility: CLINIC | Age: 36
End: 2021-05-20
Payer: COMMERCIAL

## 2021-05-20 ENCOUNTER — HOSPITAL ENCOUNTER (OUTPATIENT)
Facility: CLINIC | Age: 36
Discharge: HOME | End: 2021-05-20
Attending: INTERNAL MEDICINE | Admitting: INTERNAL MEDICINE
Payer: COMMERCIAL

## 2021-05-20 ENCOUNTER — ANESTHESIA (OUTPATIENT)
Dept: GASTROENTEROLOGY | Facility: CLINIC | Age: 36
End: 2021-05-20
Payer: COMMERCIAL

## 2021-05-20 VITALS
RESPIRATION RATE: 20 BRPM | HEIGHT: 67 IN | WEIGHT: 207.45 LBS | DIASTOLIC BLOOD PRESSURE: 68 MMHG | HEART RATE: 54 BPM | TEMPERATURE: 97.7 F | SYSTOLIC BLOOD PRESSURE: 118 MMHG | OXYGEN SATURATION: 98 % | BODY MASS INDEX: 32.56 KG/M2

## 2021-05-20 LAB — UPPER GI ENDOSCOPY: NORMAL

## 2021-05-20 PROCEDURE — 258N000003 HC RX IP 258 OP 636: Performed by: STUDENT IN AN ORGANIZED HEALTH CARE EDUCATION/TRAINING PROGRAM

## 2021-05-20 PROCEDURE — 250N000011 HC RX IP 250 OP 636: Performed by: NURSE ANESTHETIST, CERTIFIED REGISTERED

## 2021-05-20 PROCEDURE — 370N000017 HC ANESTHESIA TECHNICAL FEE, PER MIN: Performed by: INTERNAL MEDICINE

## 2021-05-20 PROCEDURE — 250N000009 HC RX 250: Performed by: NURSE ANESTHETIST, CERTIFIED REGISTERED

## 2021-05-20 PROCEDURE — 91035 G-ESOPH REFLX TST W/ELECTROD: CPT | Performed by: INTERNAL MEDICINE

## 2021-05-20 RX ORDER — NALOXONE HYDROCHLORIDE 0.4 MG/ML
0.4 INJECTION, SOLUTION INTRAMUSCULAR; INTRAVENOUS; SUBCUTANEOUS
Status: DISCONTINUED | OUTPATIENT
Start: 2021-05-20 | End: 2021-05-20 | Stop reason: HOSPADM

## 2021-05-20 RX ORDER — NALOXONE HYDROCHLORIDE 0.4 MG/ML
0.2 INJECTION, SOLUTION INTRAMUSCULAR; INTRAVENOUS; SUBCUTANEOUS
Status: DISCONTINUED | OUTPATIENT
Start: 2021-05-20 | End: 2021-05-20 | Stop reason: HOSPADM

## 2021-05-20 RX ORDER — ONDANSETRON 2 MG/ML
4 INJECTION INTRAMUSCULAR; INTRAVENOUS EVERY 6 HOURS PRN
Status: DISCONTINUED | OUTPATIENT
Start: 2021-05-20 | End: 2021-05-20 | Stop reason: HOSPADM

## 2021-05-20 RX ORDER — PROPOFOL 10 MG/ML
INJECTION, EMULSION INTRAVENOUS CONTINUOUS PRN
Status: DISCONTINUED | OUTPATIENT
Start: 2021-05-20 | End: 2021-05-20

## 2021-05-20 RX ORDER — ONDANSETRON 2 MG/ML
4 INJECTION INTRAMUSCULAR; INTRAVENOUS
Status: DISCONTINUED | OUTPATIENT
Start: 2021-05-20 | End: 2021-05-20 | Stop reason: HOSPADM

## 2021-05-20 RX ORDER — SODIUM CHLORIDE, SODIUM LACTATE, POTASSIUM CHLORIDE, CALCIUM CHLORIDE 600; 310; 30; 20 MG/100ML; MG/100ML; MG/100ML; MG/100ML
INJECTION, SOLUTION INTRAVENOUS CONTINUOUS
Status: DISCONTINUED | OUTPATIENT
Start: 2021-05-20 | End: 2021-05-20 | Stop reason: HOSPADM

## 2021-05-20 RX ORDER — FENTANYL CITRATE 50 UG/ML
25-50 INJECTION, SOLUTION INTRAMUSCULAR; INTRAVENOUS
Status: DISCONTINUED | OUTPATIENT
Start: 2021-05-20 | End: 2021-05-20 | Stop reason: HOSPADM

## 2021-05-20 RX ORDER — HYDROMORPHONE HYDROCHLORIDE 1 MG/ML
.3-.5 INJECTION, SOLUTION INTRAMUSCULAR; INTRAVENOUS; SUBCUTANEOUS EVERY 5 MIN PRN
Status: DISCONTINUED | OUTPATIENT
Start: 2021-05-20 | End: 2021-05-20 | Stop reason: HOSPADM

## 2021-05-20 RX ORDER — LIDOCAINE HYDROCHLORIDE 20 MG/ML
INJECTION, SOLUTION INFILTRATION; PERINEURAL PRN
Status: DISCONTINUED | OUTPATIENT
Start: 2021-05-20 | End: 2021-05-20

## 2021-05-20 RX ORDER — LIDOCAINE 40 MG/G
CREAM TOPICAL
Status: DISCONTINUED | OUTPATIENT
Start: 2021-05-20 | End: 2021-05-20 | Stop reason: HOSPADM

## 2021-05-20 RX ORDER — ONDANSETRON 2 MG/ML
4 INJECTION INTRAMUSCULAR; INTRAVENOUS EVERY 30 MIN PRN
Status: DISCONTINUED | OUTPATIENT
Start: 2021-05-20 | End: 2021-05-20 | Stop reason: HOSPADM

## 2021-05-20 RX ORDER — HYDRALAZINE HYDROCHLORIDE 20 MG/ML
2.5-5 INJECTION INTRAMUSCULAR; INTRAVENOUS EVERY 10 MIN PRN
Status: DISCONTINUED | OUTPATIENT
Start: 2021-05-20 | End: 2021-05-20 | Stop reason: HOSPADM

## 2021-05-20 RX ORDER — PROPOFOL 10 MG/ML
INJECTION, EMULSION INTRAVENOUS PRN
Status: DISCONTINUED | OUTPATIENT
Start: 2021-05-20 | End: 2021-05-20

## 2021-05-20 RX ORDER — ONDANSETRON 4 MG/1
4 TABLET, ORALLY DISINTEGRATING ORAL EVERY 30 MIN PRN
Status: DISCONTINUED | OUTPATIENT
Start: 2021-05-20 | End: 2021-05-20 | Stop reason: HOSPADM

## 2021-05-20 RX ORDER — FLUMAZENIL 0.1 MG/ML
0.2 INJECTION, SOLUTION INTRAVENOUS
Status: DISCONTINUED | OUTPATIENT
Start: 2021-05-20 | End: 2021-05-20 | Stop reason: HOSPADM

## 2021-05-20 RX ORDER — PROCHLORPERAZINE MALEATE 10 MG
10 TABLET ORAL EVERY 6 HOURS PRN
Status: DISCONTINUED | OUTPATIENT
Start: 2021-05-20 | End: 2021-05-20 | Stop reason: HOSPADM

## 2021-05-20 RX ORDER — ONDANSETRON 4 MG/1
4 TABLET, ORALLY DISINTEGRATING ORAL EVERY 6 HOURS PRN
Status: DISCONTINUED | OUTPATIENT
Start: 2021-05-20 | End: 2021-05-20 | Stop reason: HOSPADM

## 2021-05-20 RX ADMIN — LIDOCAINE HYDROCHLORIDE 60 MG: 20 INJECTION, SOLUTION INFILTRATION; PERINEURAL at 08:02

## 2021-05-20 RX ADMIN — SODIUM CHLORIDE, POTASSIUM CHLORIDE, SODIUM LACTATE AND CALCIUM CHLORIDE: 600; 310; 30; 20 INJECTION, SOLUTION INTRAVENOUS at 08:00

## 2021-05-20 RX ADMIN — PROPOFOL 15 MG: 10 INJECTION, EMULSION INTRAVENOUS at 08:06

## 2021-05-20 RX ADMIN — TOPICAL ANESTHETIC 1 EACH: 200 SPRAY DENTAL; PERIODONTAL at 08:01

## 2021-05-20 RX ADMIN — PROPOFOL 150 MCG/KG/MIN: 10 INJECTION, EMULSION INTRAVENOUS at 08:03

## 2021-05-20 RX ADMIN — PROPOFOL 10 MG: 10 INJECTION, EMULSION INTRAVENOUS at 08:09

## 2021-05-20 RX ADMIN — PROPOFOL 15 MG: 10 INJECTION, EMULSION INTRAVENOUS at 08:13

## 2021-05-20 RX ADMIN — PROPOFOL 15 MG: 10 INJECTION, EMULSION INTRAVENOUS at 08:10

## 2021-05-20 RX ADMIN — PROPOFOL 15 MG: 10 INJECTION, EMULSION INTRAVENOUS at 08:03

## 2021-05-20 ASSESSMENT — MIFFLIN-ST. JEOR: SCORE: 1673.63

## 2021-05-20 NOTE — OR NURSING
Pt and spouse were education on how to use and record Bravo data by endo nurse prior to procedure., They verbalized understanding of the instructions.

## 2021-05-20 NOTE — ANESTHESIA CARE TRANSFER NOTE
Patient: Komal Parra    Procedure(s):  ESOPHAGOGASTRODUODENOSCOPY, WITH BRAVO PH MONITORING DEVICE INSERTION    Diagnosis: Loose stools [R19.5]  History of chronic cough [Z87.09]  Abdominal pain, epigastric [R10.13]  Diagnosis Additional Information: No value filed.    Anesthesia Type:   MAC     Note:    Oropharynx: oropharynx clear of all foreign objects  Level of Consciousness: awake  Oxygen Supplementation: room air    Independent Airway: airway patency satisfactory and stable  Dentition: dentition unchanged  Vital Signs Stable: post-procedure vital signs reviewed and stable  Report to RN Given: handoff report given  Patient transferred to: Phase II    Handoff Report: Identifed the Patient, Identified the Reponsible Provider, Reviewed the pertinent medical history, Discussed the surgical course, Reviewed Intra-OP anesthesia mangement and issues during anesthesia, Set expectations for post-procedure period and Allowed opportunity for questions and acknowledgement of understanding      Vitals: (Last set prior to Anesthesia Care Transfer)  CRNA VITALS  5/20/2021 0755 - 5/20/2021 0828      5/20/2021             Pulse:  61    SpO2:  95 %    Resp Rate (observed):  16        Electronically Signed By: ERICK Lake CRNA  May 20, 2021  8:28 AM

## 2021-05-20 NOTE — OR NURSING
Informed Dr. Cleveland that patient states she is unable to void for hcg test. Pt also has IUD and does not believe that pregnancy is a possibility or concern. MD to talk with pt at bedside regarding test.

## 2021-05-20 NOTE — ANESTHESIA PREPROCEDURE EVALUATION
Anesthesia Pre-Procedure Evaluation    Patient: Komal Parra   MRN: [id number] : 1985        Preoperative Diagnosis: Loose stools [R19.5]  History of chronic cough [Z87.09]  Abdominal pain, epigastric [R10.13]   Procedure : Procedure(s):  ESOPHAGOGASTRODUODENOSCOPY, WITH BRAVO PH MONITORING DEVICE INSERTION     No past medical history on file.   Past Surgical History:   Procedure Laterality Date     HC TOOTH EXTRACTION W/FORCEP        Allergies   Allergen Reactions     Ibuprofen GI Disturbance      Social History     Tobacco Use     Smoking status: Never Smoker     Smokeless tobacco: Never Used   Substance Use Topics     Alcohol use: No      Wt Readings from Last 1 Encounters:   21 94.1 kg (207 lb 7.3 oz)        Anesthesia Evaluation   Pt has had prior anesthetic. Type: MAC.    No history of anesthetic complications       ROS/MED HX  ENT/Pulmonary:       Neurologic:       Cardiovascular:       METS/Exercise Tolerance:     Hematologic:       Musculoskeletal:       GI/Hepatic:     (+) GERD,     Renal/Genitourinary:       Endo:     (+) Obesity,     Psychiatric/Substance Use:     (+) psychiatric history anxiety     Infectious Disease:       Malignancy:       Other:            Physical Exam    Airway        Mallampati: I   TM distance: > 3 FB   Neck ROM: full   Mouth opening: > 3 cm    Respiratory Devices and Support         Dental  no notable dental history         Cardiovascular   cardiovascular exam normal          Pulmonary   pulmonary exam normal                OUTSIDE LABS:  CBC:   Lab Results   Component Value Date    HGB 12.6 2014     BMP: No results found for: NA, POTASSIUM, CHLORIDE, CO2, BUN, CR, GLC  COAGS: No results found for: PTT, INR, FIBR  POC: No results found for: BGM, HCG, HCGS  HEPATIC: No results found for: ALBUMIN, PROTTOTAL, ALT, AST, GGT, ALKPHOS, BILITOTAL, BILIDIRECT, ALEJANDRINA  OTHER: No results found for: PH, LACT, A1C, KERRI, PHOS, MAG, LIPASE, AMYLASE, TSH, T4, T3,  CRP, SED    Anesthesia Plan    ASA Status:  2   NPO Status:  NPO Appropriate    Anesthesia Type: MAC.     - Reason for MAC: straight local not clinically adequate   Induction: Intravenous.   Maintenance: TIVA.        Consents    Anesthesia Plan(s) and associated risks, benefits, and realistic alternatives discussed. Questions answered and patient/representative(s) expressed understanding.     - Discussed with:  Patient      - Extended Intubation/Ventilatory Support Discussed: No.      - Patient is DNR/DNI Status: No    Use of blood products discussed: No .     Postoperative Care    Pain management: IV analgesics, Oral pain medications.   PONV prophylaxis: Ondansetron (or other 5HT-3)     Comments:                Robinson Cleveland MD

## 2021-05-20 NOTE — ANESTHESIA POSTPROCEDURE EVALUATION
Patient: Komal Parra    Procedure(s):  ESOPHAGOGASTRODUODENOSCOPY, WITH BRAVO PH MONITORING DEVICE INSERTION    Diagnosis:Loose stools [R19.5]  History of chronic cough [Z87.09]  Abdominal pain, epigastric [R10.13]  Diagnosis Additional Information: No value filed.    Anesthesia Type:  MAC    Note:  Disposition: Outpatient   Postop Pain Control: Uneventful            Sign Out: Well controlled pain   PONV: No   Neuro/Psych: Uneventful            Sign Out: Acceptable/Baseline neuro status   Airway/Respiratory: Uneventful            Sign Out: Acceptable/Baseline resp. status   CV/Hemodynamics: Uneventful            Sign Out: Acceptable CV status; No obvious hypovolemia; No obvious fluid overload   Other NRE: NONE   DID A NON-ROUTINE EVENT OCCUR? No           Last vitals:  Vitals:    05/20/21 0831 05/20/21 0843 05/20/21 0845   BP: 103/65 109/66 113/69   Pulse:  56 57   Resp: 18 20 20   Temp:      SpO2: 97% 100% 100%       Last vitals prior to Anesthesia Care Transfer:  CRNA VITALS  5/20/2021 0755 - 5/20/2021 0855      5/20/2021             Pulse:  61    SpO2:  95 %    Resp Rate (observed):  16          Electronically Signed By: Robinson Cleveland MD  May 20, 2021  8:58 AM

## 2021-05-27 VITALS
TEMPERATURE: 97.1 F | BODY MASS INDEX: 32.65 KG/M2 | SYSTOLIC BLOOD PRESSURE: 110 MMHG | WEIGHT: 208 LBS | HEART RATE: 64 BPM | DIASTOLIC BLOOD PRESSURE: 62 MMHG | HEIGHT: 67 IN

## 2021-05-29 NOTE — TELEPHONE ENCOUNTER
Telephone call to patient.  She states that the titers she needs drawn are for MMR.  Request sent to CNM to place orders.  Clinical assistant to notify patient once orders have been placed so she can schedule a lab appointment.

## 2021-05-29 NOTE — TELEPHONE ENCOUNTER
Name of caller: Patient  Phone number where you may be reached: [phone]  Reason for call: Pt is a birth  and is required to get a titer in order for her to be around the baby when it's born. Pt has mainly seen the midwives for care and is requesting an order for the test. Please call pt to let her know if order will be placed and if she can just schedule a lab appt.  Best time to call back: any  If we don't reach you, is it okay to leave a detailed message? yes

## 2021-05-30 VITALS — BODY MASS INDEX: 29.43 KG/M2 | WEIGHT: 187.5 LBS | HEIGHT: 67 IN

## 2021-05-31 VITALS — BODY MASS INDEX: 30.18 KG/M2 | HEIGHT: 67 IN | WEIGHT: 192.3 LBS

## 2021-05-31 VITALS — BODY MASS INDEX: 30.61 KG/M2 | HEIGHT: 67 IN | WEIGHT: 195 LBS

## 2021-05-31 VITALS — WEIGHT: 190.8 LBS | BODY MASS INDEX: 29.95 KG/M2 | HEIGHT: 67 IN

## 2021-06-01 ENCOUNTER — RECORDS - HEALTHEAST (OUTPATIENT)
Dept: ADMINISTRATIVE | Facility: CLINIC | Age: 36
End: 2021-06-01

## 2021-06-02 VITALS — WEIGHT: 200 LBS | BODY MASS INDEX: 31.39 KG/M2 | HEIGHT: 67 IN

## 2021-06-03 VITALS
WEIGHT: 205 LBS | HEIGHT: 67 IN | DIASTOLIC BLOOD PRESSURE: 62 MMHG | BODY MASS INDEX: 32.18 KG/M2 | SYSTOLIC BLOOD PRESSURE: 108 MMHG | HEART RATE: 76 BPM

## 2021-06-04 VITALS
SYSTOLIC BLOOD PRESSURE: 110 MMHG | WEIGHT: 205 LBS | HEIGHT: 67 IN | BODY MASS INDEX: 32.18 KG/M2 | DIASTOLIC BLOOD PRESSURE: 64 MMHG | HEART RATE: 62 BPM

## 2021-06-04 VITALS
WEIGHT: 209 LBS | HEART RATE: 64 BPM | DIASTOLIC BLOOD PRESSURE: 62 MMHG | SYSTOLIC BLOOD PRESSURE: 106 MMHG | BODY MASS INDEX: 32.8 KG/M2 | HEIGHT: 67 IN

## 2021-06-04 VITALS
DIASTOLIC BLOOD PRESSURE: 68 MMHG | HEART RATE: 68 BPM | BODY MASS INDEX: 32.8 KG/M2 | WEIGHT: 209 LBS | HEIGHT: 67 IN | SYSTOLIC BLOOD PRESSURE: 112 MMHG

## 2021-06-04 NOTE — PROGRESS NOTES
IUD Insertion Procedure Note     Pre-operative Diagnosis: desires IUD to control heavy menses     Post-operative Diagnosis: normal     Indications: heavy menses     HPI/ROS:   Komal Parra is a 34 y.o. female who presents for IUD insert. LMP 1/1/2020. Current birth control method vasectomy. Her  had a vasectomy 5 years ago. She discussed iud at her last physical exam 2/14/19 as a treatment for her heavy menses.     Reports no unprotected intercourse in the last two weeks. Urine pregnancy test was done today and was negative .      Patient was given an opportunity to ask questions about all forms of birth control, meaning all prescriptions, non-prescription, and natural methods. All of her questions were answered to her satisfaction and she understood all of those answers.  She understands that no method of birth control, except abstinence, is 100% effective against pregnancy or brian sexually transmitted diseases, including Human Immunodeficiency Virus (HIV) infection that leads to the Acquired Immunodeficiency Syndrome (AIDS) disease. The following benefits, risk/side effects, warning signs, control method, intrauterine decisions to discontinue use option, regarding the birth control method, intrauterine device, were explained to her before she voluntarily decided to use this method of birth control.  No contraindications to Mirena IUD:   No untreated pelvic infection now   Has not had a serious pelvic infection in the past 3 months after a pregnancy   Does not have CA of uterus or cervix  No unexplained uterine or vaginal bleeding  No liver disease or a liver tumor   No breast cancer or any other cancer that is sensitive to progestin   Has no condition that changes the shape of the uterus  Not allergic to levonorgestrel silicone, polyethylene, silica, barium sulfate or iron oxide     BENEFITS: The IUD is 97-99% effective if all the directions regarding its use are followed carfeully. It can be more  effective if used with foam or condoms mid-cycle between periods. IUDs containing progestin may decrease menstrual flow and painful menstrual periods. The IUD provides longer protection from pregnancy (Paragard- 10 years; Mirena - 5 years)  RISKS/ SIDE EFFECTS  1. Spotting, bleeding, hemorrhage or anemia  2. Cramping or pain  3. Partial or complete expulsion of device leading to pregnancy, the pregnancy ending in miscarriage  4. Lost IUD string or other string problems  5. Puncturing of the uterus, embedding, or cervical perforation  6. Increased risk of pelvic inflammatory disease  WARNING SIGNS: The patient was advised  to call the clinic if she has any of the following early warning signs:  P - Period late (pregnancy), abnormal spotting or bleeding  A - Abdominal pain, pain with intercourse  I - Infection exposure (such as gonorrhea), abnormal discharge  N - Not feeling well, fever, chills  S - String missing, shorter or longer  ALTERNATIVES: She received written information about other methods of birth control and she chose the IUD.  She understands that she should check for the IUD strings several times during the first few months after insertion and then after each monthly period or before intercourse.   DECISION TO DISCONTINUE USE: She understands that she may have the IUD removed at any time. She knows not try to remove the device and that it should be removed only by a medical provider. If she does not desire to become pregnant, she has been told she may request to have another IUD inserted or choose to use another method of birth control. If she wishes to become pregnant, she understands most women not using birth control become pregnant within 12 months.  Procedure Details:   Bimanual exam performed revealing a anteverted uterus, midline, non-tender, negative CMT. Cervix is parous, long, thick, closed. Sterile speculum placed.  Cervix cleansed with Betadine. Tenaculum placed on cervix at 5 o'clock and 7  o'clock. Uterus sounded to 7 cm. IUD inserted without difficulty. String visible and trimmed to 3 cm. Tenaculum removed. Minimal bleeding noted. Patient tolerated procedure well. Did not have any periods of syncope, sat up and ambulated with ease.     IUD Information:  Mirena.  Lot # [id number]  Exp March 2022    Condition:  Stable    Complications:  None    Plan:  -Discussed danger signs and symptoms of the IUD including how to check for strings. Instructed patient to check her strings monthly. Discussed when/where to call with any fever, severe back pain, severe abdominal pain, heavy bleeding (soaking more than 1 pad per hour). Also encouraged to call if she does not feel her strings. She was advised to use Tylenol (Ibuprofen sensitivity) as needed for mild to moderate pain. Manufactures information given for patient education.   - Discussed that IUD contraception does not protect against STIs and so condoms should be used for STI protection in situations where she may be exposed.   - Encouraged nothing in the vagina for 24 hours.   -Mirena should be removed by 1/6/2025  -Encouraged to return to clinic in 4-6 weeks for IUD check or sooner prn.     Total time with patient 40>50% time spent in counseling or coordination of care.    Attending Physician Documentation:  I was present for or participated in the entire procedure, including opening and closing.

## 2021-06-05 VITALS
HEART RATE: 59 BPM | DIASTOLIC BLOOD PRESSURE: 59 MMHG | BODY MASS INDEX: 33.43 KG/M2 | HEIGHT: 67 IN | TEMPERATURE: 97.6 F | WEIGHT: 213 LBS | SYSTOLIC BLOOD PRESSURE: 104 MMHG

## 2021-06-05 NOTE — PROGRESS NOTES
"Assessment:   Intrauterine device appears correctly inserted  Vaginal bleeding     Plan:   -Discussed IUD appears to be correctly inserted based on physical exam. The vaginal bleeding she is having could be normal as her body adjusts to the introduction of the progesterone hormone in the Mirena, but it could also be a sign of malposition.   -Pelvic ultrasound recommended if bleeding persists for another week. Ultrasound ordered, and pt instructed to call to schedule prn.   -Discussed danger signs and symptoms of the IUD including how to check for strings. Instructed patient to check her strings monthly. Discussed when/where to call with any fever, severe back pain, severe abdominal pain, heavy bleeding (soaking more than 1 pad per hour). Also encouraged to call if she does not feel her strings. She was advised to use Ibuprofen as needed for mild to moderate pain.   -Mirena should be removed by 1/6/2025.    TT with patient 20 mns >50% time spent in counseling or coordination of care.     Evelin Bender, APRN, CNM, IBCLC  [facility]  Midwifery      Subjective:     Komal Parra is a 34 y.o. female who presents for IUD check. She had a Mirena inserted on 1/6/2020 for menses control.  For the first 2 weeks or so, she was not having any vaginal bleeding.  But she states that for the last 12 days, she has been having moderate vaginal bleeding, which became heavier on January 29.  It is possible January 29 was the start of her menstrual.  As prior to that her LMP was 1/1/2020.  She was having regular q. 28-day cycles.  She states the bleeding is moderate.  She is wearing a tampon but it is not completely saturated when she changes it every few hours.  Minimal cramping, well-controlled with Ibuprofen. She noticed one episode of right sided \"ovary pain\" during intercourse.  Does have some light cramping but denies severe pain, fever, chills.    Review of Systems  Pertinent items are noted in HPI.    " "  Objective:   /64 (Patient Site: Left Arm, Patient Position: Sitting, Cuff Size: Adult Regular)   Pulse 62   Ht 5' 6.5\" (1.689 m)   Wt 205 lb (93 kg)   LMP 01/01/2020   Breastfeeding No   BMI 32.59 kg/m      Physical Exam:  General: Pleasant, articulate, well-groomed, well-nourished female.  Not in any apparent distress.  Abdomen: Soft, nontender, no masses palpated, negative CVAT.  External genitalia: Normal hair distribution, no lesions.  Urethral opening: Without lesions or discharge. No tenderness.   Bladder: Without masses, or tenderness.  Vagina: Pink, rugated, normal-appearing discharge.  Cervix: parous, pink, smooth, no lesions. IUD strings visualized and 3 cm in length.   Bimanual: small, mobile, nontender, no masses.  Negative CMT.  Adnexa, without masses or tenderness.             "

## 2021-06-06 NOTE — TELEPHONE ENCOUNTER
RX refill request: last seen yesterday 2/20 for annual physical, did not refill medication for anxiety. Celexa 20mg daily reordered with refills for 1 year.

## 2021-06-06 NOTE — PROGRESS NOTES
Assessment:   1.  Well woman exam  2.  Anxiety well-controlled on Celexa  3.  BMI 33  4.  Healthcare maintenance     Plan:      1. Discussed nutrition and exercise.    2. Blood tests: declines all HM /screening labs  3. Breast self exam technique reviewed and patient encouraged to perform self-exam monthly.   4. Contraception: Mirena IUD placed 1/6/2020 followed by a string check 2/3/2020 and pelvic ultrasound verifying good placement on 2/17/2020.  5.  Next pap due February 2024. HPV co-testing discussed with that pap, then paps q 5 yrs if both negative.  6.  Referred to the 24-week healthy lifestyle program in order to address progressive weight gain despite healthy lifestyle choices.  7.  Encouraged to establish care with a family medicine provider here at the [facility] to further address her chronic physical and mental health diagnoses such as: Asthma, anxiety, recurrent sinus infections etc.  8.  RTC 1 year for annual physical exam, PRN    Subjective:      Komal Parra is a 34 y.o. female who presents for an annual exam.   Pap smear up-to-date on 2/14/2018: Normal, negative HPV  Mirena IUD was placed on January 6, 2020.  String check performed on February 3, 2020 and pelvic ultrasound verifying good placement on 2/17/2020.  Feeling frustrated as she works out 6 to 7 days/week, she has a degree in dietetics and nutrition, and she has experienced about a 35 pound weight gain since after her last delivery 6 years ago.  Open to a referral to the 24-week healthy lifestyle program.    Healthy Habits:   Regular Exercise: Yes   Sunscreen Use: Yes  Healthy Diet: Yes  Dental Visits Regularly: Yes  Seat Belt: Yes  Sexually active: Yes  STI risk No  domestic violence No    Self Breast Exam Monthly:Yes  Colonoscopy: No  Lipid Profile: No  Glucose Screen: No  Prevention of Osteoporosis: No  Last Dexa: N/A      Immunization History   Administered Date(s) Administered     DTP 1985, 1985, 01/20/1987,  1988, 1991     DTP / HiB 1986, 1986, 1987, 1988, 1991     DTaP, historic 1986, 1986, 1987, 1988, 1991     HIB (PRP-D) 1989     Hep B, Peds or Adolescent 1996, 1997, 1997     Hep B, historic 1996, 1996, 1997, 1997     HiB, historic,unspecified 1986, 1986, 1987, 1988, 1989     IPV 1986, 1986, 1988, 1991     Influenza, inj, historic,unspecified 10/14/2015, 10/12/2017     Influenza,seasonal quad, PF, =/> 6months 10/03/2016     Influenza,seasonal, Inj IIV3 10/09/2013, 10/08/2014     MMR 10/23/1987, 1997     Meningococcal MCV4 Conjugate,Unspecified 2004     OPV,Trivalent,Historic([id number] only) 1986, 1986, 1988, 1991     Td,adult,historic,unspecified 1997     Tdap 2007, 2017     Immunization status: up to date and documented.    Gynecologic History  No LMP recorded.  Contraception: IUD    Cancer screening:   Last Pap: 2019. Results were: Normal, negative HPV    OB History    Para Term  AB Living   2 2 2     2   SAB TAB Ectopic Multiple Live Births           2      # Outcome Date GA Lbr Bentley/2nd Weight Sex Delivery Anes PTL Lv   2 Term 14 40w0d  6 lb 12 oz (3.062 kg) M Vag-Spont   BRANDY      Birth Comments: BF x 14 months   1 Term 10/01/11 40w0d  6 lb 9 oz (2.977 kg) M Vag-Spont   BRANDY      Birth Comments: PUPPP, second degree perineal laceration, BF x 10 months       Current Outpatient Medications   Medication Sig Dispense Refill     cholecalciferol, vitamin D3, (VITAMIN D3) 2,000 unit capsule Take 2,000 Units by mouth daily.       citalopram (CELEXA) 20 MG tablet Take 1 tablet (20 mg total) by mouth every morning. 90 tablet 4     magnesium 250 mg Tab Take 1 tablet by mouth daily.       multivitamin therapeutic tablet Take 1 tablet by mouth daily.       omega-3 fatty  acids-fish oil 300-1,000 mg cap Take by mouth.       No current facility-administered medications for this visit.      Past Medical History:   Diagnosis Date     Asthma     exercise induced     KELLEY (generalized anxiety disorder)     Celexa 20 mg daily     IBS (irritable bowel syndrome)      Varicella      Past Surgical History:   Procedure Laterality Date     WISDOM TOOTH EXTRACTION       Ibuprofen  Family History   Problem Relation Age of Onset     Ovarian cancer Maternal Grandmother 30     Cervical cancer Maternal Grandmother      Rheumatic fever Maternal Grandmother      Heart disease Maternal Grandmother      Esophageal cancer Father      Lung cancer Father      Brain cancer Maternal Grandfather      Breast cancer Other      Social History     Socioeconomic History     Marital status:      Spouse name: Gregor     Number of children: 2     Years of education: 18     Highest education level: Not on file   Occupational History     Occupation:      Employer: [employer]   Social Needs     Financial resource strain: Not on file     Food insecurity:     Worry: Not on file     Inability: Not on file     Transportation needs:     Medical: Not on file     Non-medical: Not on file   Tobacco Use     Smoking status: Never Smoker     Smokeless tobacco: Never Used   Substance and Sexual Activity     Alcohol use: Yes     Drug use: No     Sexual activity: Yes     Partners: Male     Birth control/protection: Other   Lifestyle     Physical activity:     Days per week: Not on file     Minutes per session: Not on file     Stress: Not on file   Relationships     Social connections:     Talks on phone: Not on file     Gets together: Not on file     Attends Yazidism service: Not on file     Active member of club or organization: Not on file     Attends meetings of clubs or organizations: Not on file     Relationship status: Not on file     Intimate partner violence:     Fear of current or ex partner: Not on file  "    Emotionally abused: Not on file     Physically abused: Not on file     Forced sexual activity: Not on file   Other Topics Concern     Not on file   Social History Narrative     Not on file       Review of Systems  General:  Denies problem  Eyes: Denies problem  Ears/Nose/Throat: Denies problem  Cardiovascular: Denies problem  Respiratory:  Denies problem  Gastrointestinal:  denies problems  Genitourinary: denies problems  Musculoskeletal:  Denies problem  Skin: Denies problem  Neurologic:denies problems  Psychiatric: denies problems  Endocrine: Please see subjective; weight gain        Objective:         Vitals:    02/20/20 0826   BP: 106/62   Pulse: 64   Weight: 209 lb (94.8 kg)   Height: 5' 6.5\" (1.689 m)     Thyroid cascade in February 2019 WNL    Physical Exam:  General Appearance: Alert, cooperative, no distress, appears stated age  Skin: Skin color, texture, turgor normal, no rashes or lesions  Throat: Lips, mucosa, and tongue normal; teeth and gums normal  HEENT: grossly normal; otoscopic and opthalmic exam not performed.   Neck: Supple, symmetrical, trachea midline, no adenopathy;  thyroid: not enlarged, symmetric, no tenderness/mass/nodules  Lungs: Clear to auscultation bilaterally, respirations unlabored  Breasts: No breast masses, tenderness, asymmetry, or nipple discharge.  Heart: Regular rate and rhythm, S1 and S2 normal, no murmur  Abdomen: Soft, non-tender. No organomegaly or masses  Pelvic: Deferred.  Pelvic exam performed during Mirena IUD insertion on January 6, 2020 with both an IUD string check on 2/3/2020 and a pelvic ultrasound on 2/17/2020 to verify IUD position.  "

## 2021-06-08 NOTE — TELEPHONE ENCOUNTER
Left message to call back for: Pt to call back on mainline to reschedule   Information to relay to patient:  Pt needs to cancel and reschedule 6/1/2020 appointment.  Provider is not seeing Pts in clinic.  If Pt would like, she can schedule a VV with Angely Kruger to discuss any concerns she may have.  Angely Kruger will be out on [facility] starting the week of 6/29/2020.

## 2021-06-08 NOTE — PROGRESS NOTES
Dear  Johanny Orozco, Cici,cnm  [address]  [place] [address] [place] [id number]    Thank you for the opportunity to participate in the care of  Komal Parra.    She is a 31 y.o. y/o who comes to the clinic because she is very tired.    The patient mentions that she has been tired most of her life. Her tiredness has been present for most of her life and it is difficult for her to even know what should be normal. She feels that she can fall asleep at any time but she able to control her sleepiness by moving. 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     She considers herself a long sleeper and needs 9 to 10 hours of sleep per day.     She has no difficulties falling asleep.    She has frequent dreams. He describes some episodes during which she wakes up and she is not certain if she is dreaming or awake but when she tries to move, her body does not respond. No cataplexy.     She does not think that she snores at night.     Epworths Sleepiness Scale 2/13/2017   Sitting and reading 3   Watching TV 2   Sitting, inactive in a public place (e.g. a theatre or a meeting) 1   As a passenger in a car for an hour without a break 2   Lying down to rest in the afternoon when circumstances permit 2   Sitting and talking to someone 0   Sitting quietly after a lunch without alcohol 1   In a car, while stopped for a few minutes in traffic 0   Total score 11   STOP BANG 2/13/2017   Do you snore loudly (louder than talking or loud enough to be heard through closed doors)? 0   Do you often feel tired, fatigued, or sleepy during daytime? 1   Has anyone observed you stop breathing in your sleep? 0   Do you have or are you being treated for high blood pressure? 0   BMI more than 35 kg/m2 0   Age over 50 years old? 0   Neck circumference greater than 16 inches? 0   Gender male? 0   Total Score 1   Rooming 2/13/2017   Usual bedtime 10   Sleep Latency less than 5 mn   Awakenings 1-3    Wake Up Time 7-8   Energy Drinks 0   Coffee 1   Cola 0   Difficulty falling asleep No   Difficulty staying asleep No   Excessive daytime tiredness Yes   Excessive daytime sleepiness Yes   Dozing off while driving No   Shift Worker No   Sleep Walking? No   Sleep Talking? Yes   Kicking or punching? No   Restless legs symptoms No       Past Medical History  Past Medical History:   Diagnosis Date     Asthma     exercise induced     KELLEY (generalized anxiety disorder)     Celexa 20 mg daily     IBS (irritable bowel syndrome)         Past Surgical History  Past Surgical History:   Procedure Laterality Date     WISDOM TOOTH EXTRACTION          Meds  Current Outpatient Prescriptions   Medication Sig Dispense Refill     citalopram (CELEXA) 20 MG tablet Take 1 tablet (20 mg total) by mouth every morning. 30 tablet 12     No current facility-administered medications for this visit.         Allergies  Ibuprofen     Social History  Social History     Social History     Marital status:      Spouse name: N/A     Number of children: 2     Years of education: 18     Occupational History      [employer]     Social History Main Topics     Smoking status: Never Smoker     Smokeless tobacco: Never Used     Alcohol use Not on file     Drug use: Not on file     Sexual activity: Yes     Partners: Male     Birth control/ protection: Other     Other Topics Concern     Not on file     Social History Narrative        Family History  Family History   Problem Relation Age of Onset     Ovarian cancer Maternal Grandmother      Cervical cancer Maternal Grandmother      Rheumatic fever Maternal Grandmother      Heart disease Maternal Grandmother      Esophageal cancer Father      Lung cancer Father      Brain cancer Maternal Grandfather            Review of Systems:  Constitutional: Negative except as noted in HPI.   Eyes: Negative except as noted in HPI.   ENT: Negative except as noted in HPI.   Cardiovascular: Negative  "except as noted in HPI.   Respiratory: Negative except as noted in HPI.   Gastrointestinal: Negative except as noted in HPI.   Genitourinary: Negative except as noted in HPI.   Musculoskeletal: Negative except as noted in HPI.   Integumentary: Negative except as noted in HPI.   Neurological: Negative except as noted in HPI.   Psychiatric: Negative except as noted in HPI.   Endocrine: Negative except as noted in HPI.   Hematologic/Lymphatic: Negative except as noted in HPI.      Physical Exam:  Visit Vitals     Pulse 62     Ht 5' 6.5\" (1.689 m)     Wt 187 lb 8 oz (85 kg)     BMI 29.81 kg/m2     BMI:Body mass index is 29.81 kg/(m^2).   GEN: NAD,   Head: Normocephalic.  EYES: PERRLA, EOMI  ENT: Oropharynx is clear, mallampatti class IV airway. Uvula is edematous  Nasal mucosa is pink  Neck : Thyroid is not palpable  CV: Regular rate and rhythm, S1 & S2 are normal. No murmurs  MUSCULOSKELETAL: no clubbing, cyanosis or edema  SKIN: warm, dry, no rashes  Neurological: Alert, oriented to time, place, and person.  Psych: normal mood, normal affect        Labs/Studies:     Lab Results   Component Value Date    WBC 4.7 12/22/2016    HGB 12.4 12/22/2016    HCT 37.0 12/22/2016    MCV 89 12/22/2016     12/22/2016         Chemistry        Component Value Date/Time     12/03/2015 1620    K 4.0 12/03/2015 1620     12/03/2015 1620    CO2 23 12/03/2015 1620    BUN 17 12/03/2015 1620    CREATININE 0.77 12/03/2015 1620    GLU 82 12/03/2015 1620        Component Value Date/Time    CALCIUM 9.4 12/03/2015 1620    ALKPHOS 55 12/03/2015 1620    AST 24 12/03/2015 1620    ALT 16 12/03/2015 1620    BILITOT 0.2 12/03/2015 1620            Lab Results   Component Value Date    TSH 1.88 12/03/2015         Assessment and Plan:  In summary Komal Parra is a 31 y.o. year old female here for consultation.    1. Hypersomnia.  Some of her symptoms are consistent with a primary hypersomnia.  Her vivid dreams and inability to stay " alert while inactive, point towards narcolepsy.  Some other elements are consistent with IH.  Recommend getting an overnight PSG with 2 weeks of actigraphy,and MSLT.     Patient verbalized understanding of these issues, agrees with the plan and all questions were answered today. Patient was given an opportuntity to voice any other symptoms or concerns not listed above. Patient did not have any other symptoms or concerns.      Patient instructed to stop at  to schedule appointment before leaving today.     MD TOM Ochoa Board Certified in Internal Medicine and Sleep Medicine  [facility].

## 2021-06-08 NOTE — TELEPHONE ENCOUNTER
Reason contacted:  To help the Pt reschedule for a VV to EST care with Angely  Information relayed:  Pt states she will wait until she needs to be seen to schedule a visit.  Pt did not have any concerns that she wanted to address at the moment.     Additional questions:  No  Further follow-up needed:  No  Okay to leave a detailed message:  Yes

## 2021-06-09 NOTE — PROGRESS NOTES
Assessment:      34 y.o., routine Mirena IUD check -  5 months post-insertion, correct placement confirmed.        Plan:     1.  Reassurance re: correct placement, normalcy of side effects and that these often lessen with extended use of IUD.   2.  Wet prep    3.  Taught and encouraged to check IUD strings monthly.    4.  Warning signs related to IUD use (PAINS) and when to call CNM reviewed.  5.   Trimmed strings by 1 cm    Subjective:      Komal Parra is a 34 y.o. female who presents for IUD check. This was inserted on 1/6/20. The patient has complaints that her partner can feel poking of the strings and wonders if we can trim them. The patient is sexually active. She has not  checked her IUD strings.  Bleeding is much improved.  She does have some spotting days.  Notes that when she would normally get her period, she has a metallic smell in her vagina.  This may be normal from blood that is in her vagina that may or may not be discharged.  She is open to wet prep today as well.  Discussed tx if BV is present.      Review of Systems  A 12 point comprehensive review of systems was negative except as noted.     Objective:     Pelvic:SE only - normal vaginal and mucosal tissue.  No abnormal discharge or odor.  Cervix normal appearance with IUD strings visible x 2 without evidence of IUD itself.  Approximately 3 cm long.  Trimmed to 2cm.

## 2021-06-10 NOTE — PROGRESS NOTES
"Komal Parra is a 35 year old female who is being evaluated via a billable video visit.      The patient has been notified of following:     \"This video visit will be conducted via a call between you and your physician/provider. We have found that certain health care needs can be provided without the need for an in-person physical exam.  This service lets us provide the care you need with a video conversation.  If a prescription is necessary we can send it directly to your pharmacy.  If lab work is needed we can place an order for that and you can then stop by our lab to have the test done at a later time.    If during the course of the call the physician/provider feels a video visit is not appropriate, you will not be charged for this service.\"       Video-Visit Details  Type of service:  Video Visit    Video Start Time: 2:04 PM  Video End Time:  2:23 PM  9 minutes of phone call     Originating Location (pt. Location): Home    Distant Location (provider location):  [facility] [place]     Platform used: [facility]      GI CLINIC VISIT    CC/REFERRING MD:  Referred Self  REASON FOR CONSULTATION: chronic cough    ASSESSMENT/PLAN:  1. Chronic cough   Patient reports 2 years of chronic cough certain foods including lettuce, certain types of wheat.  Has been seen by ENT and allergy without a source of symptoms.  Does have significant family history of GERD and father with esophageal cancer.  Has done a 2-month trial of PPI without improvement in symptoms.  EGD with grade B esophagitis.  Biopsies for eosinophilic esophagitis were not taken, though classic findings were not described.  Bravo results are pending, will be helpful to determine if the patient has silent reflux versus GERD as a source of her symptoms.      Would recommend a trial of PPI with omeprazole.  Given that she did not respond previously to pantoprazole, will plan for high dose for 1 month, then 40 mg once a day " "ongoing.  Also discussed lifestyle modifications for GERD as outlined below.  I explained a big  of symptoms can also be weight gain, would recommend aiming for a BMI of less than 25     Patient is interested in meeting with our GI dietitian.  Can discuss her chronic IBS symptoms with consideration of low FODMAP diet (modified), in addition to GERD.  --Omeprazole 40 mg twice a day for 1 month  --Then decrease to once a day ongoing.  Take this on an empty stomach 30 to 60 minutes before eating or drinking  --I will let you know the results of the problem when I get them back  --Check with your insurance to see if a nutrition visit is covered.  Then scheduled to meet with Renetta Garland to discuss the low FODMAP diet and GERD  GERD Lifestyle Modifications  -- Avoid foods high in fat or high fructose corn syrup  -- do not eat within three hours of laying down or going to bed  -- raise the head of your bed 6-8 inches  -- avoid alcohol  -- aim for BMI of less than <25 and lose extra weight as needed    RTC 6-8 weeks     Thank you for this consultation.  It was a pleasure to participate in the care of this patient; please contact us with any further questions.     36 Minutes was spent on the date of the encounter during chart review, history and exam, documentation, and further activities as noted       Stephanie Hernandez PA-C  Division of Gastroenterology, Hepatology & Nutrition  [facility]        HPI  Komal Parra is a 35 year old presenting for chronic cough. Patient reports a chronic cough for 2 years. Symptoms do seem to be getting worse with time.     She has previously been seen by an allergist ([facility] in [place]- gluten, lettuce, preservative allergies were negative) and an ENT, a dietitian also suggested yeast overgrowth. Also notes lettuce seems to be a trigger. She notes that symptoms went away for a while (early April for 2 weeks), but that they came back \"with a vengence\". " "Symptoms seem to be year round. She also describes sensation of \"lump in her throat\" when she swallows that feels \"mucusy\". She states this is dry cough- and can last for hours after eating. When she coughs, she does not necessarily feal better but she always has this urge to cough. She has been on PPI with pantoprazole which did not help after two months, Flonase did not help. She does get some GERD symptoms related to food triggers. She occasionally will have dysphagia to solid foods if she does not chew properly. No dysphagia to liquids.  Foods that are high in wheat seem to trigger symptoms. She reports that she can typically tolerate white bread, but what bread can make things worse.     She notes that her dad had really severe GERD and diet of esophageal cancer, her sister had severe GERD as well but this improved.     She does note SOB- and feels like she cannot breathe. She will try taking an inhaler- does not make much of a difference. She is able to run without coughing, but with other types of workouts can trigger symptoms.     Notes IBS- cannot eat fried foods, has had this for 20 years. In the beginning of April she was constipated, and she was not coughing during this time. Can be up to four times a day. Will have urgency with bowel movements.     PROBLEM LIST  Patient Active Problem List    Diagnosis Date Noted     Indication for care in labor or delivery 01/17/2014     Priority: Medium       PERTINENT PAST MEDICAL HISTORY:  Anxiety     PREVIOUS SURGERIES:  Past Surgical History:   Procedure Laterality Date     HC TOOTH EXTRACTION W/FORCEP  2002       PREVIOUS ENDOSCOPY:  Impression:          - LA Grade B reflux esophagitis.                        - Normal stomach.                        - Normal examined duodenum.                        - The BRAVO pH capsule was deployed.                        - No specimens collected.   ALLERGIES:     Allergies   Allergen Reactions     Ibuprofen GI Disturbance "       PERTINENT MEDICATIONS:    Current Outpatient Medications:      CITALOPRAM HYDROBROMIDE PO, Take 20 mg by mouth, Disp: , Rfl:      Prenatal Vit-Fe Fumarate-FA (PRENATAL MULTIVITAMIN  PLUS IRON) 27-0.8 MG TABS, Take 1 tablet by mouth daily, Disp: , Rfl:    Avoids NSAIDs     SOCIAL HISTORY:  No alcohol use or drug use   Social History     Socioeconomic History     Marital status:      Spouse name: Not on file     Number of children: Not on file     Years of education: Not on file     Highest education level: Not on file   Occupational History     Not on file   Social Needs     Financial resource strain: Not on file     Food insecurity     Worry: Not on file     Inability: Not on file     Transportation needs     Medical: Not on file     Non-medical: Not on file   Tobacco Use     Smoking status: Never Smoker     Smokeless tobacco: Never Used   Substance and Sexual Activity     Alcohol use: No     Drug use: No     Sexual activity: Yes     Partners: Male     Birth control/protection: None   Lifestyle     Physical activity     Days per week: Not on file     Minutes per session: Not on file     Stress: Not on file   Relationships     Social connections     Talks on phone: Not on file     Gets together: Not on file     Attends [employer] service: Not on file     Active member of club or organization: Not on file     Attends meetings of clubs or organizations: Not on file     Relationship status: Not on file     Intimate partner violence     Fear of current or ex partner: Not on file     Emotionally abused: Not on file     Physically abused: Not on file     Forced sexual activity: Not on file   Other Topics Concern     Parent/sibling w/ CABG, MI or angioplasty before 65F 55M? Not Asked   Social History Narrative     Not on file       FAMILY HISTORY:  FH of CRC: none   Mother with pre-cancerous polyps   FH of IBD: none   Dad with bad reflux and esophageal cancer     Past/family/social history reviewed and no  changes    PHYSICAL EXAMINATION:  General appearance: Healthy appearing adult, in no acute distress  Eyes: Sclera anicteric  Ears, nose, mouth and throat: No obvious external lesions of ears and nose, Hearing intact  Neck: Symmetric, No obvious external lesions  Respiratory: Normal respiration, no use of accessory muscles   Skin: No rashes or jaundice   Psychiatric: Oriented to person, place and time, Appropriate mood and affect.     PERTINENT STUDIES:  No results found for any previous visit.

## 2021-06-11 ENCOUNTER — VIRTUAL VISIT (OUTPATIENT)
Dept: GASTROENTEROLOGY | Facility: CLINIC | Age: 36
End: 2021-06-11
Payer: COMMERCIAL

## 2021-06-11 VITALS — HEIGHT: 67 IN | BODY MASS INDEX: 29.82 KG/M2 | WEIGHT: 190 LBS

## 2021-06-11 DIAGNOSIS — K21.00 GASTROESOPHAGEAL REFLUX DISEASE WITH ESOPHAGITIS WITHOUT HEMORRHAGE: Primary | ICD-10-CM

## 2021-06-11 PROCEDURE — 99214 OFFICE O/P EST MOD 30 MIN: CPT | Mod: GT | Performed by: PHYSICIAN ASSISTANT

## 2021-06-11 RX ORDER — OMEPRAZOLE 40 MG/1
CAPSULE, DELAYED RELEASE ORAL
Qty: 120 CAPSULE | Refills: 0 | Status: SHIPPED | OUTPATIENT
Start: 2021-06-11 | End: 2021-08-06

## 2021-06-11 ASSESSMENT — MIFFLIN-ST. JEOR: SCORE: 1594.46

## 2021-06-11 ASSESSMENT — PAIN SCALES - GENERAL: PAINLEVEL: NO PAIN (0)

## 2021-06-11 NOTE — PROGRESS NOTES
Assessment:     1.  31-year-old  2 para   2.  Missed menses  3.  Obesity  4.  Generalized anxiety disorder well managed on citalopram     Plan:      1. Discussed nutrition and exercise.     2. Blood tests: Thyroid cascade and prolactin.  3.  If serum lab results within normal limits, consider progesterone withdrawal bleed if no menstruation in 1-2 weeks.  4. Contraception:  status post vasectomy  5.  Next pap due 2018. HPV co-testing discussed with that pap, then paps q 5 yrs if both negative.  6.  RTC in 1-2 weeks if menstruation does not commence or PRN    Total time spent with patient: 15 minutes, all of which was spent counseling and coordinating care    Subjective:      Komal Parra is a 31 y.o. female who presents to the clinic today complaining of missed menstruation.  Home pregnancy tests have been negative.  Denies abrupt change in sleep, stress, eating habits, weight or exercise.  Last menstrual period 2017 with a few days of dark brown discharge in between.      Immunization History   Administered Date(s) Administered     DTaP, historic 1986, 1986, 1987, 1988, 1991     Hep B, historic 1996, 1997, 1997     HiB, historic 1986, 1986, 1987, 1988     IPV 1986, 1986, 1988, 1991     Influenza, inj, historic 10/09/2013, 10/08/2014     MMR 10/23/1987, 1997     Meningococcal Conjugate 2004     Td, historic 1997     Tdap 2007, 2017     Immunization status: up to date and documented.    Gynecologic History  Patient's last menstrual period was 2017 (exact date).  Contraception: vasectomy    Cancer screening:   Last Pap: 2015. Results were: normal    OB History    Para Term  AB Living   2 2 2   2   SAB TAB Ectopic Multiple Live Births       2      # Outcome Date GA Lbr Bentley/2nd Weight Sex Delivery Anes PTL Lv   2 Term 14 40w0d   6 lb 12 oz (3.062 kg) M Vag-Spont   BRANDY      Birth Comments: BF x 14 months   1 Term 10/01/11 40w0d  6 lb 9 oz (2.977 kg) M Vag-Spont   BRANDY      Birth Comments: PUPPP, second degree perineal laceration, BF x 10 months          Current Outpatient Prescriptions   Medication Sig Dispense Refill     citalopram (CELEXA) 20 MG tablet Take 1 tablet (20 mg total) by mouth every morning. 30 tablet 12     No current facility-administered medications for this visit.      Past Medical History:   Diagnosis Date     Asthma     exercise induced     KELLEY (generalized anxiety disorder)     Celexa 20 mg daily     IBS (irritable bowel syndrome)      Past Surgical History:   Procedure Laterality Date     WISDOM TOOTH EXTRACTION       Ibuprofen  Family History   Problem Relation Age of Onset     Ovarian cancer Maternal Grandmother      Cervical cancer Maternal Grandmother      Rheumatic fever Maternal Grandmother      Heart disease Maternal Grandmother      Esophageal cancer Father      Lung cancer Father      Brain cancer Maternal Grandfather      Social History     Social History     Marital status:      Spouse name: N/A     Number of children: 2     Years of education: 18     Occupational History      [employer]     Social History Main Topics     Smoking status: Never Smoker     Smokeless tobacco: Never Used     Alcohol use Not on file     Drug use: Not on file     Sexual activity: Yes     Partners: Male     Birth control/ protection: Other     Other Topics Concern     Not on file     Social History Narrative       Review of Systems  General:  Denies problem  Eyes: Denies problem  Ears/Nose/Throat: Denies problem  Cardiovascular: Denies problem  Respiratory:  Denies problem  Gastrointestinal:  denies problems  Genitourinary: See HPI please  Musculoskeletal:  Denies problem  Skin: Denies problem  Neurologic:denies problems  Psychiatric: anxiety symptoms  Endocrine: See HPI please        Objective:        "  Vitals:    07/13/17 1046   BP: 102/60   Pulse: 60   Weight: 190 lb 12.8 oz (86.5 kg)   Height: 5' 6.5\" (1.689 m)       Physical Exam:  General Appearance: Alert, cooperative, no distress, appears stated age, obese    Urine pregnancy test today: Negative    "

## 2021-06-11 NOTE — NURSING NOTE
"Chief Complaint   Patient presents with     RECHECK     Follow up, recheck after Endoscopy.        Vitals:    06/11/21 1342   Weight: 86.2 kg (190 lb)   Height: 1.702 m (5' 7\")       Body mass index is 29.76 kg/m .    Brianne Morales LPN      "

## 2021-06-11 NOTE — LETTER
"    6/11/2021         RE: Komal Parra  [address] [facility] [address]        Dear Colleague,    Thank you for referring your patient, Komal Parra, to the [facility] [place]. Please see a copy of my visit note below.    Komal Parra is a 35 year old female who is being evaluated via a billable video visit.      The patient has been notified of following:     \"This video visit will be conducted via a call between you and your physician/provider. We have found that certain health care needs can be provided without the need for an in-person physical exam.  This service lets us provide the care you need with a video conversation.  If a prescription is necessary we can send it directly to your pharmacy.  If lab work is needed we can place an order for that and you can then stop by our lab to have the test done at a later time.    If during the course of the call the physician/provider feels a video visit is not appropriate, you will not be charged for this service.\"       Video-Visit Details  Type of service:  Video Visit    Video Start Time: 2:04 PM  Video End Time:  2:23 PM  9 minutes of phone call     Originating Location (pt. Location): Home    Distant Location (provider location):  [facility] [place]     Platform used: [facility]      GI CLINIC VISIT    CC/REFERRING MD:  Referred Self  REASON FOR CONSULTATION: chronic cough    ASSESSMENT/PLAN:  1. Chronic cough   Patient reports 2 years of chronic cough certain foods including lettuce, certain types of wheat.  Has been seen by ENT and allergy without a source of symptoms.  Does have significant family history of GERD and father with esophageal cancer.  Has done a 2-month trial of PPI without improvement in symptoms.  EGD with grade B esophagitis.  Biopsies for eosinophilic esophagitis were not taken, though classic findings were not described.  Bravo results are pending, " will be helpful to determine if the patient has silent reflux versus GERD as a source of her symptoms.      Would recommend a trial of PPI with omeprazole.  Given that she did not respond previously to pantoprazole, will plan for high dose for 1 month, then 40 mg once a day ongoing.  Also discussed lifestyle modifications for GERD as outlined below.  I explained a big  of symptoms can also be weight gain, would recommend aiming for a BMI of less than 25     Patient is interested in meeting with our GI dietitian.  Can discuss her chronic IBS symptoms with consideration of low FODMAP diet (modified), in addition to GERD.  --Omeprazole 40 mg twice a day for 1 month  --Then decrease to once a day ongoing.  Take this on an empty stomach 30 to 60 minutes before eating or drinking  --I will let you know the results of the problem when I get them back  --Check with your insurance to see if a nutrition visit is covered.  Then scheduled to meet with Renetta Garland to discuss the low FODMAP diet and GERD  GERD Lifestyle Modifications  -- Avoid foods high in fat or high fructose corn syrup  -- do not eat within three hours of laying down or going to bed  -- raise the head of your bed 6-8 inches  -- avoid alcohol  -- aim for BMI of less than <25 and lose extra weight as needed    RTC 6-8 weeks     Thank you for this consultation.  It was a pleasure to participate in the care of this patient; please contact us with any further questions.     36 Minutes was spent on the date of the encounter during chart review, history and exam, documentation, and further activities as noted       Stephanie Hernandez PA-C  Division of Gastroenterology, Hepatology & Nutrition  [facility]        HPI  Komal Parra is a 35 year old presenting for chronic cough. Patient reports a chronic cough for 2 years. Symptoms do seem to be getting worse with time.     She has previously been seen by an allergist ([facility] in  "[place]- gluten, lettuce, preservative allergies were negative) and an ENT, a dietitian also suggested yeast overgrowth. Also notes lettuce seems to be a trigger. She notes that symptoms went away for a while (early April for 2 weeks), but that they came back \"with a vengence\". Symptoms seem to be year round. She also describes sensation of \"lump in her throat\" when she swallows that feels \"mucusy\". She states this is dry cough- and can last for hours after eating. When she coughs, she does not necessarily feal better but she always has this urge to cough. She has been on PPI with pantoprazole which did not help after two months, Flonase did not help. She does get some GERD symptoms related to food triggers. She occasionally will have dysphagia to solid foods if she does not chew properly. No dysphagia to liquids.  Foods that are high in wheat seem to trigger symptoms. She reports that she can typically tolerate white bread, but what bread can make things worse.     She notes that her dad had really severe GERD and diet of esophageal cancer, her sister had severe GERD as well but this improved.     She does note SOB- and feels like she cannot breathe. She will try taking an inhaler- does not make much of a difference. She is able to run without coughing, but with other types of workouts can trigger symptoms.     Notes IBS- cannot eat fried foods, has had this for 20 years. In the beginning of April she was constipated, and she was not coughing during this time. Can be up to four times a day. Will have urgency with bowel movements.     PROBLEM LIST  Patient Active Problem List    Diagnosis Date Noted     Indication for care in labor or delivery 01/17/2014     Priority: Medium       PERTINENT PAST MEDICAL HISTORY:  Anxiety     PREVIOUS SURGERIES:  Past Surgical History:   Procedure Laterality Date     HC TOOTH EXTRACTION W/FORCEP  2002       PREVIOUS ENDOSCOPY:  Impression:          - LA Grade B reflux esophagitis. "                        - Normal stomach.                        - Normal examined duodenum.                        - The BRAVO pH capsule was deployed.                        - No specimens collected.   ALLERGIES:     Allergies   Allergen Reactions     Ibuprofen GI Disturbance       PERTINENT MEDICATIONS:    Current Outpatient Medications:      CITALOPRAM HYDROBROMIDE PO, Take 20 mg by mouth, Disp: , Rfl:      Prenatal Vit-Fe Fumarate-FA (PRENATAL MULTIVITAMIN  PLUS IRON) 27-0.8 MG TABS, Take 1 tablet by mouth daily, Disp: , Rfl:    Avoids NSAIDs     SOCIAL HISTORY:  No alcohol use or drug use   Social History     Socioeconomic History     Marital status:      Spouse name: Not on file     Number of children: Not on file     Years of education: Not on file     Highest education level: Not on file   Occupational History     Not on file   Social Needs     Financial resource strain: Not on file     Food insecurity     Worry: Not on file     Inability: Not on file     Transportation needs     Medical: Not on file     Non-medical: Not on file   Tobacco Use     Smoking status: Never Smoker     Smokeless tobacco: Never Used   Substance and Sexual Activity     Alcohol use: No     Drug use: No     Sexual activity: Yes     Partners: Male     Birth control/protection: None   Lifestyle     Physical activity     Days per week: Not on file     Minutes per session: Not on file     Stress: Not on file   Relationships     Social connections     Talks on phone: Not on file     Gets together: Not on file     Attends Anglican service: Not on file     Active member of club or organization: Not on file     Attends meetings of clubs or organizations: Not on file     Relationship status: Not on file     Intimate partner violence     Fear of current or ex partner: Not on file     Emotionally abused: Not on file     Physically abused: Not on file     Forced sexual activity: Not on file   Other Topics Concern     Parent/sibling w/  CABG, MI or angioplasty before 65F 55M? Not Asked   Social History Narrative     Not on file       FAMILY HISTORY:  FH of CRC: none   Mother with pre-cancerous polyps   FH of IBD: none   Dad with bad reflux and esophageal cancer     Past/family/social history reviewed and no changes    PHYSICAL EXAMINATION:  General appearance: Healthy appearing adult, in no acute distress  Eyes: Sclera anicteric  Ears, nose, mouth and throat: No obvious external lesions of ears and nose, Hearing intact  Neck: Symmetric, No obvious external lesions  Respiratory: Normal respiration, no use of accessory muscles   Skin: No rashes or jaundice   Psychiatric: Oriented to person, place and time, Appropriate mood and affect.     PERTINENT STUDIES:  No results found for any previous visit.               Again, thank you for allowing me to participate in the care of your patient.        Sincerely,        Stephanie Hernandez PA-C

## 2021-06-11 NOTE — PATIENT INSTRUCTIONS
It was a pleasure taking care of you today.  I've included a brief summary of our discussion and care plan from today's visit below.  Please review this information with your primary care provider.  ______________________________________________________________________    My recommendations are summarized as follows:    --Omeprazole 40 mg twice a day for 1 month  --Then decrease to once a day ongoing.  Take this on an empty stomach 30 to 60 minutes before eating or drinking  --I will let you know the results of the problem when I get them back  --Check with your insurance to see if a nutrition visit is covered.  Then scheduled to meet with Renetta Garland to discuss the low FODMAP diet and GERD  GERD Lifestyle Modifications  -- Avoid foods high in fat or high fructose corn syrup  -- do not eat within three hours of laying down or going to bed  -- raise the head of your bed 6-8 inches  -- avoid alcohol  -- aim for BMI of less than <25 and lose extra weight as needed    Return to GI Clinic in 3 months to review your progress.    ______________________________________________________________________    How do I schedule labs, imaging studies, or procedures that were ordered in clinic today?     Labs: To schedule lab appointment at the [facility], use my chart or call [phone]. If you have a [facility] lab closer to home where you are regularly seen you can give them a call.     Procedures: If a colonoscopy, upper endoscopy, breath test, esophageal manometry, or pH impedence was ordered today, our endoscopy team will call you to schedule this. If you have not heard from our endoscopy team within a week, please call [phone] to schedule.     Imaging Studies: If you were scheduled for a CT scan, X-ray, MRI, ultrasound, HIDA scan or other imaging study, please call [phone] to have this scheduled.     Referral: If a referral to another specialty was ordered, expect a phone call or follow  instructions above. If you have not heard from anyone regarding your referral in a week, please call our clinic to check the status.     Who do I call with any questions after my visit?  Please be in touch if there are any further questions that arise following today's visit.  There are multiple ways to contact your gastroenterology care team.        During business hours, you may reach a Gastroenterology nurse at [phone]      To schedule or reschedule an appointment, please call [phone].       You can always send a secure message through [employer].  [employer] messages are answered by your nurse or doctor typically within 24 hours.  Please allow extra time on weekends and holidays.        For urgent/emergent questions after business hours, you may reach the on-call GI Fellow by contacting the [facility]  at [phone].     How will I get the results of any tests ordered?    You will receive all of your results.  If you have signed up for [employer], any tests ordered at your visit will be available to you after your physician reviews them.  Typically this takes 1-2 weeks.  If there are urgent results that require a change in your care plan, your physician or nurse will call you to discuss the next steps.      What is [employer]?  [employer] is a secure way for you to access all of your healthcare records from the [facility].  It is a web based computer program, so you can sign on to it from any location.  It also allows you to send secure messages to your care team.  I recommend signing up for [employer] access if you have not already done so and are comfortable with using a computer.      How to I schedule a follow-up visit?  If you did not schedule a follow-up visit today, please call [phone] to schedule a follow-up office visit.      Sincerely,    Stephanie Hernandez PA-C  Division of Gastroenterology, Hepatology & Nutrition  [facility]

## 2021-06-13 NOTE — PROGRESS NOTES
"Assessment:     1. Breast Lump, Left  2.  Anxiety     Plan:      1.  Referral for left breast ultrasound/imaging  2.  Blood tests: None today  3.  Referral list for counseling and therapy given.  4.  Contraception: Vasectomy  5.  Discussed the process of decreasing dosage of Celexa used for generalized anxiety disorder.  Should patient wish to wean off of this medication, recommend patient begin by taking Celexa 20 mg one half tab p.o. daily ×2 weeks, then every other day ×2 weeks, then off.  Reiterated the importance of being in communication with either primary care provider or this writer should she decide to move forward with the above.  Patient has verbal understanding of the plan of care and agrees with the plan of care.  6.  RTC in 2 months for annual well woman exam or sooner as needed.      Total time spent with patient: 25 minutes, greater than 50% of which was spent counseling and coordinating care.    Subjective:      Komal Parra is a 32 y.o. female who presents to the clinic concerned about a left breast lump palpated about 2 weeks ago.  \"It feels like an exclamation ginette in its shape with a long lump and a smaller lump below it.\"  The area of concern is on the left breast, upper outer quadrant.  It feels sore, and she notices that even while left side lying in bed.  Never felt this before.  No recent trauma to that area.  Patient exercises regularly, but no new exercise/weight lifting.  Denies redness, increased warmth, skin changes or nipple discharge.  Weaned her second baby from breast-feeding about 2 years ago.  Drinks 1 café latte and 1 cup of regular coffee per day in terms of caffeine intake.    Voices interest in either decreasing or potentially discontinuing Celexa which is used for generalized anxiety disorder.  This medication was initiated after the birth of her first baby.  It has served her well.  She exercises regularly, has a healthy diet and does not currently see a counselor or " therapist.  She is curious to know if perhaps she does not need to be taking this medication any longer.  She is open to a referral for counseling and therapy.    Healthy Habits:   Regular Exercise: Yes   Healthy Diet: Yes  Self Breast Exam Monthly:Yes      Immunization History   Administered Date(s) Administered     DTP 1986, 1986, 1987, 1988, 1991     DTP / HiB 1986, 1986, 1987, 1988, 1991     DTaP, historic 1986, 1986, 1987, 1988, 1991     HIB (PRP-D) 1989     Hep B, Peds or Adolescent 1996, 1997, 1997     Hep B, historic 1996, 1996, 1997, 1997     HiB, historic 1986, 1986, 1987, 1988, 1989     IPV 1986, 1986, 1988, 1991     Influenza, inj, historic 10/09/2013, 10/08/2014     Influenza,seasonal quad, PF, 36+MOS 10/03/2016     MMR 10/23/1987, 1997     Meningococcal Conjugate 2004     Meningococcal, Historic 2004     OPV 1986, 1986, 1988, 1991     Td, historic 1997     Tdap 2007, 2017     Immunization status: stated as current, but no records available.  States she will get influenza vaccination at work.    Gynecologic History  Patient's last menstrual period was 2017.  Contraception: vasectomy    Cancer screening:   Last Pap: 12/3/2015. Results were: normal    OB History    Para Term  AB Living   2 2 2   2   SAB TAB Ectopic Multiple Live Births       2      # Outcome Date GA Lbr Bentley/2nd Weight Sex Delivery Anes PTL Lv   2 Term 14 40w0d  6 lb 12 oz (3.062 kg) M Vag-Spont   BRANDY      Birth Comments: BF x 14 months   1 Term 10/01/11 40w0d  6 lb 9 oz (2.977 kg) M Vag-Spont   BRANDY      Birth Comments: PUPPP, second degree perineal laceration, BF x 10 months          Current Outpatient Prescriptions   Medication Sig Dispense Refill      "cholecalciferol, vitamin D3, (VITAMIN D3) 2,000 unit capsule Take 2,000 Units by mouth daily.       citalopram (CELEXA) 20 MG tablet Take 1 tablet (20 mg total) by mouth every morning. 30 tablet 12     magnesium 250 mg Tab Take 1 tablet by mouth daily.       multivitamin therapeutic tablet Take 1 tablet by mouth daily.       No current facility-administered medications for this visit.      Past Medical History:   Diagnosis Date     Asthma     exercise induced     KELLEY (generalized anxiety disorder)     Celexa 20 mg daily     IBS (irritable bowel syndrome)      Past Surgical History:   Procedure Laterality Date     WISDOM TOOTH EXTRACTION       Ibuprofen  Family History   Problem Relation Age of Onset     Ovarian cancer Maternal Grandmother 30     Cervical cancer Maternal Grandmother      Rheumatic fever Maternal Grandmother      Heart disease Maternal Grandmother      Esophageal cancer Father      Lung cancer Father      Brain cancer Maternal Grandfather      Social History     Social History     Marital status:      Spouse name: N/A     Number of children: 2     Years of education: 18     Occupational History      [employer]     Social History Main Topics     Smoking status: Never Smoker     Smokeless tobacco: Never Used     Alcohol use Yes     Drug use: No     Sexual activity: Yes     Partners: Male     Birth control/ protection: Other     Other Topics Concern     Not on file     Social History Narrative       Review of Systems  General:  Denies problem  Breast: See HPI please   Psychiatric: See HPI please  Endocrine: denies problems        Objective:         Vitals:    10/04/17 0904   BP: 90/56   Pulse: 68   Weight: 195 lb (88.5 kg)   Height: 5' 6.5\" (1.689 m)       Physical Exam:  General Appearance: Alert, cooperative, no distress, appears stated age.  Calm.  Good eye contact with linear thought process and good insight.  Breasts: Left breast appears slightly larger than the right.  Right " breast with diffuse fibrocystic changes but no discrete lump.  Left breast in the upper outer quadrant at about 2:00, there is a palpable, discrete and well demarcated breast lump measuring 3 cm x 3 cm; it is mobile and soft.  Distally, there is a 1 cm x 1 cm palpable, discrete and well demarcated breast lump.  There are no skin color or texture changes or nipple discharge.    Thyroid cascade on 7/13/2017 within normal limits.

## 2021-06-15 NOTE — PROGRESS NOTES
HISTORY OF PRESENT ILLNESS  Patient reports that she has had a cough for 2 years. Worse in the last 6 months. Correlates with eating, especially bread. She asks about gluten allergies. Saw an allergist. Allergy skin test was negative. She was given pantoproazole.She denies heartburn. She reports that she is unsure if it helpep . Has seen ENT for chronic sinus infection in the past.  She feels like she can't breathe. She coughs to get more air. Worse with eating. She reports that her father had GERD with Mccrary's esophagus and developed esophageal carcinoma.    REVIEW OF SYSTEMS  Review of Systems: a 10-system review was performed. Pertinent positives are noted in the HPI and on a separate scanned document in the chart.    PMH, PSH, FH and SH has documented in the EHR.      EXAM    CONSTITUTIONAL  General Appearance:  Normal, well developed, well nourished, no obvious distress  Ability to Communicate:  communicates appropriately.    HEAD AND FACE  Appearance and Symmetry:  Normal, no scalp or facial scarring or suspicious lesions.    EYE  Normal external eye, conjunctiva, lids, cornea.     EARS  Clinical speech reception threshold:  Normal, able to hear normal speech.  Auricle:  Normal, Auricles without scars, lesions, masses.  External auditory canal:  Normal, External auditory canal normal.  Tympanic membrane:  Normal, Tympanic membranes normal without swelling or erythema.    NOSE (scope)  Architecture:  Normal, Grossly normal external nasal architecture with no masses or lesions.  Mucosa:  Normal mucosa, No polyps or masses.  Septum:  Normal, Septum non-obstructing.  Turbinates:  Normal, No turbinate abnormalities    ORAL CAVITY AND OROPHARYNX  Lips:  Normal.  Dental and gingiva:  Normal, No obvious dental or gingival disease.  Mucosa:  Normal, Moist mucous membranes.  Tongue:  Normal, Tongue mobile with no mucosal abnormalities  Hard and soft palate:  Normal, Hard and soft palate without cleft or mucosal  lesions.  Oral pharynx:  Normal, Posterior pharynx without lesions or remarkable asymmetry.  Saliva:  Normal, Clear saliva.  Masses:  Normal, No palpable masses or pathologically enlarged lymph nodes.    LARYNGOSCOPY  Risks and benefit of Flexible laryngeal endoscopy were discussed with the patient and they wished to proceed.  The nose was sprayed with 4% lidocaine / 1% phenylephrine.  After allowing adequate time for anesthesia the procedure was started.  Patient tolerated the procedure well.  See exam note for findings.    LARYNX AND HYPOPHARYNX (scope)  Voice Quality:  Normal voice quality.  Supra glottis:  Normal, No edema or erythema.  Epiglottis:  Normal, No epiglottic mass or mucosal lesions.  Pyriform sinuses:  Normal, Pyriform sinuses clear.  Vocal cords appearance:  Normal, Vocal cord motion symmetric.  Vocal cord motion:  Normal, Vocal cord motion symmetric  Endolarynx:  Normal, No Endolaryngeal mass or mucosal lesions.    NECK  Masses/lymph nodes:  Normal, No worrisome neck masses or lymph nodes.  Salivary glands:  Normal, Parotid and submandibular glands.  Trachea and larynx position:  Normal, Trachea and larynx midline.  Thyroid:  Normal, No thyroid abnormality.  Tenderness:  Normal, No cervical tenderness.  Suppleness:  Normal, Neck supple    CARDIOVASCULAR  Regular rate and rhythm.  No cyanosis, clubbing or edema.    PULSES  Carotid pulses normal    NEUROLOGICAL  Speech pattern:  Normal, Proasaic    RESPIRATORY  Symmetry and Respiratory effort:  Normal, Symmetric chest movement and expansion with no increased intercostal retractions or use of accessory muscles.     IMPRESSION  Chronic cough. No evidence of sinus disease on today's exam. In addition no evience of pathology in the upper aerodigestive tract.    RECOMMENDATION  GI evaluation, given her family history of Mccrary's esophagus and her concerns. I explained that there was no evidence of nasal or sinus disease to explain her symptoms.    Davis  MD [facility]

## 2021-06-15 NOTE — TELEPHONE ENCOUNTER
Spoke with patient  Reminded that she is due for her annual physical exam  She is currently busy and will call back later

## 2021-06-16 NOTE — PROGRESS NOTES
ANNUAL EXAM    S: Komal presents to [facility] for her annual exam.   Primary concern today is some joint pain, especially in her knees, shoulders, but mostly in hands.  Pain is mostly in the evening rather than the morning;  Notices it about once per week.  This concerns her because her mother was diagnosed with rheumatoid arthritis at the age of 27.  No associated redness or swelling.  She states that she is in good health in general.  No significant changes in her personal or family history in the last year.      Immunization History   Administered Date(s) Administered     DTP 1986, 1986, 1987, 1988, 1991     DTP / HiB 1986, 1986, 1987, 1988, 1991     DTaP, historic 1986, 1986, 1987, 1988, 1991     HIB (PRP-D) 1989     Hep B, Peds or Adolescent 1996, 1997, 1997     Hep B, historic 1996, 1996, 1997, 1997     HiB, historic,unspecified 1986, 1986, 1987, 1988, 1989     IPV 1986, 1986, 1988, 1991     Influenza, inj, historic,unspecified 10/09/2013, 10/08/2014     Influenza,seasonal quad, PF, 36+MOS 10/03/2016     MMR 10/23/1987, 1997     Meningococcal MCV4 Conjugate,Unspecified 2004     Meningococcal,Historic,Unknown serogroups 2004     OPV,Trivalent,Historic(1484-0488 only) 1986, 1986, 1988, 1991     Td,adult,historic,unspecified 1997     Tdap 2007, 2017     Immunization status: as above    Gynecologic History  Patient's last menstrual period was 2018 (exact date).  Contraception: vasectomy  Last Pap: 2015. Results were: normal  Last mammogram: 10/2017 to evaluate lump:  All wnl    OB History    Para Term  AB Living   2 2 2   2   SAB TAB Ectopic Multiple Live Births       2      # Outcome Date GA Lbr Bnetley/2nd Weight Sex Delivery Anes  PTL Lv   2 Term 01/17/14 40w0d  6 lb 12 oz (3.062 kg) M Vag-Spont   BRANDY      Birth Comments: BF x 14 months   1 Term 10/01/11 40w0d  6 lb 9 oz (2.977 kg) M Vag-Spont   BRANDY      Birth Comments: PUPPP, second degree perineal laceration, BF x 10 months          Current Outpatient Prescriptions   Medication Sig Dispense Refill     cholecalciferol, vitamin D3, (VITAMIN D3) 2,000 unit capsule Take 2,000 Units by mouth daily.       citalopram (CELEXA) 20 MG tablet Take 1 tablet (20 mg total) by mouth every morning. 30 tablet 12     magnesium 250 mg Tab Take 1 tablet by mouth daily.       multivitamin therapeutic tablet Take 1 tablet by mouth daily.       No current facility-administered medications for this visit.      Past Medical History:   Diagnosis Date     Asthma     exercise induced     KELLEY (generalized anxiety disorder)     Celexa 20 mg daily     IBS (irritable bowel syndrome)      Varicella      Past Surgical History:   Procedure Laterality Date     WISDOM TOOTH EXTRACTION       Ibuprofen  Family History   Problem Relation Age of Onset     Ovarian cancer Maternal Grandmother 30     Cervical cancer Maternal Grandmother      Rheumatic fever Maternal Grandmother      Heart disease Maternal Grandmother      Esophageal cancer Father      Lung cancer Father      Brain cancer Maternal Grandfather      Breast cancer Other      Social History     Social History     Marital status:      Spouse name: N/A     Number of children: 2     Years of education: 18     Occupational History      [employer]     Social History Main Topics     Smoking status: Never Smoker     Smokeless tobacco: Never Used     Alcohol use Yes     Drug use: No     Sexual activity: Yes     Partners: Male     Birth control/ protection: Other     Other Topics Concern     Not on file     Social History Narrative       Review of Systems  General:  Denies problems, Eyes:  Denies problems, Ears/Nose/Throat:  Denies problems, Cardiovascular:  " Denies problems, Respiratory:  Denies problems, Gastrointestinal:  Denies problems, Genitourinary:  Denies problems, Musculoskeletal:  Denies problems, Skin:  Denies problems, Neurologic:  Denies problems, Psychiatric:  Denies problems, Endocrine:  Denies problems, Heme/Lymphatic:  Denies problems and Allergic/Immunologic:  Denies problems       Objective:         Vitals:    02/09/18 1601   BP: 112/60   Pulse: (!) 56   Resp: 16   Weight: 192 lb 4.8 oz (87.2 kg)   Height: 5' 6.5\" (1.689 m)         O: Patient is well nourished, well groomed with normal body habitus.  Thyroid: without tenderness, enlargement, or masses.  Respiratory:  clear to auscultation bilaterally.  Cardiovascular: regular rhythm and rate without murmur.  Lymphatic: no enlargement in axilla, groin, clavicle, or neck.  Back:  Straight, no CVA tenderness  Breast: without dimpling, no rash, no retractions, thickened fibrocystic areas bilaterally.  Right breast upper outer quadrant with diffuse thickened areas;  Left breast with several masses upper outer quadrant;  Pt states are smaller than previously when scanned last year.  Abdomen: no masses, no guarding, no tenderness, no organomegaly.  Pelvic: External genitalia: Normal appearance, no lesions noted.   Vagina:  normal tone, physiologic discharge, rugae present.   Urinary meatus: without discharge, no redness     Cervix: no lesions, not friable, no cervical motion tenderness.  Very deep in pelvis and not fully visualized   Uterus: no enlargement, mobile, firm, nontender. Position: AV   Adnexa: no tenderness, no nodularity or masses.  Rectal: no digital exam done, inspection only. No lesions noted.  Skin no induration, no masses, no rash, no lesion, no erythema.  Psych: alert and oriented ×3.  Appropriate judgment, insight, normal affect.    A:  Normal well woman exam. 32 y.o. yrs old.   Fibrocystic breasts with thickenings and masses bilaterally, stable   Diffuse joint pain    P:  1.  Pap " recommendations discussed--due every five years, so next due 12/20   2.  Labs: STD screening offered, declined   3.  Self breast exam taught and encouraged.   4. Contraception discussed-- currently has vasectomy   5.  Diet and exercise discussed.  Currently has regular exercise regimen 5 days/week   6.  multivit, calcium, vitamin D recommended.  Discussed reducing caffeine in diet to help with breast changes.   7.  Encouraged to visit PCP for evaluation of joint pain   7.  Return to clinic in 1 year or as needed.    ERICK Juarez, JAZZYM    Greater than 50% of time was spent on education, counseling, and coordination of care.  Total time spent: 30 min.

## 2021-06-16 NOTE — TELEPHONE ENCOUNTER
Informed patient of Dr. Guzman's message below, and that her TDAP and Heb B series were both completed. Pt voiced understanding.

## 2021-06-16 NOTE — PROGRESS NOTES
Assessment/ Plan     1. Routine general medical examination at a health care facility  Reviewed and updated patient's past medical, surgical, family, social history, along with current medications and allergies.  Reviewed general healthy living lifestyle guidelines.  Updated patient's health maintenance as further outlined below.  - Comprehensive Metabolic Panel  - HM2(CBC w/o Differential)    2. Class 1 obesity without serious comorbidity with body mass index (BMI) of 33.0 to 33.9 in adult, unspecified obesity type  Body mass index is 33.86 kg/m .  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  She is not interested in meeting with a weight loss specialist.    3. Vaginal bleeding  Vaginal spotting since having Mirena IUD placed in January/2020.  History of heavy menses prior to placement.  We will ensure there are no cervical infections causing her spotting.  Transvaginal ultrasound from 2/2020 was normal with IUD in correct position.  IUD strings visible on exam today.  If unremarkable infection work-up could consider a short course of combined OCPs to help stabilize endometrium.  - Wet Prep, Vaginal    4. Screening for malignant neoplasm of cervix  Previously normal in 2019, repeated today due to vaginal bleeding.  IUD strings in place.  - Gynecologic Cytology (PAP Smear)    5. Lipid screening  - Lipid [place] RANDOM    Dorota Guzman DO    Subjective:     Komal Parra is a 35 y.o. female who presents for an annual exam.      Other concerns addressed:  Vaginal bleeding discussed below.    Healthy Habits:   Healthy Diet: Yes  Regular Exercise: Yes  Seat Belt: Yes  Dental Visits Regularly: Yes  ------------------  Last Colonoscopy (50-76yo): age 16, IBS  Last Dexa (66 yo+): n/a  Prevention of Osteoporosis (Ca 1200 mg/d, Vit D 1000 IU): Yes  Last annual blood work and any abnormalities?: normal  Low dose CT (30 pack year, 55-76yo q1y): n/a    Gynecologic  History  Patient's last menstrual period was 03/22/2021.   Periods getting heavier as she got older, changed tampon every 30 min. Still gets cramps. Periods have improved since getting IUD placed, lasts 2 weeks described as spotting, rather than a true period. Mirena IUD placed 1/2020. Some spotting is brownish, old blood. Yeast infection 2 weeks ago, treated with monistat.   Last Pap (21-66yo): 2/14/2019. Results were: normal  Last mammogram (40-76yo): n/a.     Immunization History   Administered Date(s) Administered     COVID-19,PF,Pfizer 01/22/2021, 02/12/2021     DTP 1986, 1986, 01/20/1987, 02/04/1988, 07/05/1991     DTP / HiB 1986, 1986, 01/20/1987, 02/04/1988, 07/05/1991     DTaP, historic 1986, 1986, 01/20/1987, 02/04/1988, 07/05/1991     HIB (PRP-D) 09/25/1989     Hep B, Peds or Adolescent 08/01/1996, 02/20/1997, 08/29/1997     Hep B, historic 08/01/1996, 08/29/1996, 02/20/1997, 08/29/1997     HiB, historic,unspecified 1986, 1986, 01/20/1987, 02/04/1988, 09/25/1989     INFLUENZA,SEASONAL QUAD, PF, =/> 6months 10/03/2016     IPV 1986, 1986, 02/04/1988, 07/05/1991     Influenza, inj, historic,unspecified 10/14/2015, 10/12/2017, 10/10/2020     Influenza,seasonal, Inj IIV3 10/09/2013, 10/08/2014     MMR 10/23/1987, 06/13/1997     Meningococcal MCV4 Conjugate,Unspecified 07/20/2004     OPV,Trivalent,Historic(4735-5424 only) 1986, 1986, 02/04/1988, 07/05/1991     Td,adult,historic,unspecified 04/27/1997     Tdap 07/09/2007, 07/13/2017     Immunization status: up to date and documented.     Health Maintenance   Topic Date Due     ASTHMA ACTION PLAN  Never done     Asthma Control Test  Never done     Pneumococcal Vaccine: Pediatrics (0 to 5 Years) and At-Risk Patients (6 to 64 Years) (1 of 2 - PPSV23) Never done     PREVENTIVE CARE VISIT  02/20/2021     ADVANCE CARE PLANNING  10/04/2022     PAP SMEAR  02/14/2024     HPV TEST  02/14/2024     TD  18+ HE  2027     HEPATITIS B VACCINES  Completed     INFLUENZA VACCINE RULE BASED  Completed     TDAP ADULT ONE TIME DOSE  Completed     COVID-19 Vaccine  Completed     HEPATITIS C SCREENING  Discontinued     HIV SCREENING  Discontinued       OB History    Para Term  AB Living   2 2 2     2   SAB TAB Ectopic Multiple Live Births           2      # Outcome Date GA Lbr Bentley/2nd Weight Sex Delivery Anes PTL Lv   2 Term 14 40w0d  6 lb 12 oz (3.062 kg) M Vag-Spont   BRANDY      Birth Comments: BF x 14 months   1 Term 10/01/11 40w0d  6 lb 9 oz (2.977 kg) M Vag-Spont   BRANDY      Birth Comments: PUPPP, second degree perineal laceration, BF x 10 months       Current Outpatient Medications   Medication Sig Dispense Refill     citalopram (CELEXA) 20 MG tablet Take 1 tablet (20 mg total) by mouth every morning. 90 tablet 4     multivitamin therapeutic tablet Take 1 tablet by mouth daily.       No current facility-administered medications for this visit.      Past Medical History:   Diagnosis Date     KELLEY (generalized anxiety disorder)     Celexa 20 mg daily     IBS (irritable bowel syndrome)      Past Surgical History:   Procedure Laterality Date     WISDOM TOOTH EXTRACTION       Ibuprofen  Family History   Problem Relation Age of Onset     Ovarian cancer Maternal Grandmother 30     Cervical cancer Maternal Grandmother      Rheumatic fever Maternal Grandmother      Heart disease Maternal Grandmother      Esophageal cancer Father      Lung cancer Father      Brain cancer Maternal Grandfather      Breast cancer Other      No Medical Problems Mother      Asthma Son      Anxiety disorder Son      Social History     Socioeconomic History     Marital status:      Spouse name: Gregor     Number of children: 2     Years of education: 18     Highest education level: Not on file   Occupational History     Occupation:      Employer: [employer]   Social Needs     Financial resource strain: Not on  "file     Food insecurity     Worry: Not on file     Inability: Not on file     Transportation needs     Medical: Not on file     Non-medical: Not on file   Tobacco Use     Smoking status: Never Smoker     Smokeless tobacco: Never Used   Substance and Sexual Activity     Alcohol use: Yes     Frequency: Monthly or less     Drug use: No     Sexual activity: Yes     Partners: Male     Birth control/protection: Other   Lifestyle     Physical activity     Days per week: Not on file     Minutes per session: Not on file     Stress: Not on file   Relationships     Social connections     Talks on phone: Not on file     Gets together: Not on file     Attends Church service: Not on file     Active member of club or organization: Not on file     Attends meetings of clubs or organizations: Not on file     Relationship status: Not on file     Intimate partner violence     Fear of current or ex partner: Not on file     Emotionally abused: Not on file     Physically abused: Not on file     Forced sexual activity: Not on file   Other Topics Concern     Not on file   Social History Narrative    Works in [employer] at [employer]  with two sons.       Review of Systems  12 point ROS positive for what is indicated in HPI, otherwise negative.      Objective:      Physical Exam:  /59   Pulse (!) 59   Temp 97.6  F (36.4  C) (Oral)   Ht 5' 6.5\" (1.689 m)   Wt 213 lb (96.6 kg)   LMP 03/22/2021   BMI 33.86 kg/m      General appearance: Alert, cooperative, no distress, appears stated age  Head: Normocephalic, atraumatic, without obvious abnormality  EARS: Tm's gray dull with structures seen bilaterally  Eyes: Pupils equal round, reactive.  Conjunctiva clear.  Nose: Nares normal, no drainage.  Throat: Lips, mucosa, tongue normal mucosa pink and moist  Neck: Supple, symmetric, trachea midline, no adenopathy.  No thyroid enlargement, tenderness or nodules.    Back: Symmetric  Lungs: Clear to auscultation bilaterally, " no wheezing or crackles present.  Respirations unlabored  Heart: Regular rate and rhythm, normal S1 and S2, no murmur, rub or gallop.  Abdomen: Soft, nontender, nondistended. No masses or organomegaly.  Extremities: Extremities normal, atraumatic.  No cyanosis or edema.  Skin: Skin color, texture, turgor normal no rashes or lesions on limited skin exam  Neurologic: CN II through XII intact, normal strength.  GYN: Inspection of external genitalia shows a normal anatomic appearance . Vagina with normal rugae, no notable discharge. Cervix is normal in appearance without lesions or discharge noted, IUD strings in place  Psych: mood neutral and affect appropriate    Results for orders placed or performed in visit on 03/24/21   HM2(CBC w/o Differential)   Result Value Ref Range    WBC 5.2 4.0 - 11.0 thou/uL    RBC 4.40 3.80 - 5.40 mill/uL    Hemoglobin 13.9 12.0 - 16.0 g/dL    Hematocrit 42.1 35.0 - 47.0 %    MCV 96 80 - 100 fL    MCH 31.6 27.0 - 34.0 pg    MCHC 33.0 32.0 - 36.0 g/dL    RDW 12.3 11.0 - 14.5 %    Platelets 212 140 - 440 thou/uL    MPV 9.9 7.0 - 10.0 fL       Dorota Guzman DO

## 2021-06-16 NOTE — TELEPHONE ENCOUNTER
"Please call patient with recommendation regarding travel to [place]:    Vaccine recommendations depend on where she is in [place] and the activities she will be participating in. I'd recommend visiting the CDC website for recommendations. If she has further questions, she should schedule a \"travel visit.\"    https://wwwnc.cdc.gov/travel/destinations/traveler/none/[email or web address]#vaccines-and-medicines    Dorota Guzman, DO      "

## 2021-06-16 NOTE — TELEPHONE ENCOUNTER
Incoming call from patient stating she saw Dr. Guzman recently but forgot to ask about vaccines for traveling. Patient stated she will be going to [place] in a few months. She wasn't sure if there certain vaccines she needs, like Hep B and t-dap. Patient does not recall when she had them. Patient would like to check with provider. Patient would like a call back.

## 2021-06-17 NOTE — PROGRESS NOTES
[facility]  [address] [facility] [address]  Dept: [phone]  Dept Fax: [phone]  Primary Provider: Sudheer Rooney,   Pre-op Performing Provider: SUDHEER ROONEY    PREOPERATIVE EVALUATION:  Today's date: 5/17/2021    Komal Parra is a 35 y.o. female who presents for a preoperative evaluation.    Surgical Information:  Surgery/Procedure: Endoscopy  Surgery Location: [facility]  Surgeon: Dr. Rocha  Surgery Date: 5/20/2021  Time of Surgery: TBD  Where patient plans to recover: At home with family  Fax number for surgical facility: Note does not need to be faxed, will be available electronically in Epic.    Type of Anesthesia Anticipated: per anesthesia    1. Preop general physical exam  2. Chronic cough  Undergoing EGD evaluation for chronic cough.    The proposed surgical procedure is considered LOW risk.     The patient has the following additional risks and recommendations for perioperative complications outlined below with additional medication recommendations    RECOMMENDATION:  APPROVAL GIVEN to proceed with proposed procedure, without further diagnostic evaluation.    3. Anxiety  Stable on Celexa which she takes in the evenings and can continue preoperatively.    4. Class 1 obesity without serious comorbidity with body mass index (BMI) of 33.0 to 33.9 in adult, unspecified obesity type  Body mass index is 33.07 kg/m .  Follow-up at routine physical.    Subjective     HPI related to upcoming procedure:   Chronic cough for the past 2 years.  Has undergone evaluation from allergist and ENT without clear etiology.  Her father has history of Mccrary's esophagus.  Following with GI specialist who plan to do an EGD with biopsies for further evaluation.    Preop Questions 5/10/2021   Have you ever had a heart attack or stroke? No   Have you ever had surgery on your heart or blood vessels, such as a stent placement, a coronary artery bypass, or surgery on an artery in  your head, neck, heart, or legs? No   Do you have chest pain with activity? No   Do you have a history of  heart failure? No   Do you currently have a cold, bronchitis or symptoms of other infection? No   Do you have a cough, shortness of breath, or wheezing? YES -as above   Do you or anyone in your family have previous history of blood clots? No   Do you or does anyone in your family have a serious bleeding problem such as prolonged bleeding following surgeries or cuts? No   Have you ever had problems with anemia or been told to take iron pills? No   Have you had any abnormal blood loss such as black, tarry or bloody stools, or abnormal vaginal bleeding? No   Have you ever had a blood transfusion? No   Are you willing to have a blood transfusion if it is medically needed before, during, or after your surgery? Yes   Have you or any of your relatives ever had problems with anesthesia? No   Do you have sleep apnea, excessive snoring or daytime drowsiness? No   Do you have any artifical heart valves or other implanted medical devices like a pacemaker, defibrillator, or continuous glucose monitor? No   Do you have artificial joints? No   Are you allergic to latex? No   Is there any chance that you may be pregnant? No     Health Care Directive:  Patient does not have a Health Care Directive or Living Will: Patient states has Advance Directive and will bring in a copy to clinic.    Preoperative Review of :    reviewed - no record of controlled substances prescribed.    See problem list for active medical problems.  Problems all longstanding and stable, except as noted/documented.  See ROS for pertinent symptoms related to these conditions.    Review of Systems  CONSTITUTIONAL: NEGATIVE for fever, chills, change in weight  INTEGUMENTARY/SKIN: NEGATIVE for worrisome rashes, moles or lesions  EYES: NEGATIVE for vision changes or irritation  ENT/MOUTH: NEGATIVE for ear, mouth and throat problems  RESP: Positive for  chronic cough  CV: NEGATIVE for chest pain, palpitations or peripheral edema  GI: NEGATIVE for nausea, abdominal pain, heartburn, or change in bowel habits  : NEGATIVE for frequency, dysuria, or hematuria  MUSCULOSKELETAL: NEGATIVE for significant arthralgias or myalgia  NEURO: NEGATIVE for weakness, dizziness or paresthesias  ENDOCRINE: NEGATIVE for temperature intolerance, skin/hair changes  HEME: NEGATIVE for bleeding problems  PSYCHIATRIC: NEGATIVE for changes in mood or affect    Patient Active Problem List    Diagnosis Date Noted     Intrauterine device surveillance 02/03/2020     BMI 33.0-33.9,adult 02/15/2019     Contraceptive management 12/26/2016     Health care maintenance 12/03/2015     Anxiety 01/16/2014     Past Medical History:   Diagnosis Date     KELLEY (generalized anxiety disorder)     Celexa 20 mg daily     IBS (irritable bowel syndrome)      Past Surgical History:   Procedure Laterality Date     WISDOM TOOTH EXTRACTION       Current Outpatient Medications   Medication Sig Dispense Refill     citalopram (CELEXA) 20 MG tablet Take 1 tablet (20 mg total) by mouth every morning. 90 tablet 4     multivitamin therapeutic tablet Take 1 tablet by mouth daily.       No current facility-administered medications for this visit.        Allergies   Allergen Reactions     Ibuprofen Nausea Only       Social History     Tobacco Use     Smoking status: Never Smoker     Smokeless tobacco: Never Used   Substance Use Topics     Alcohol use: Yes     Frequency: Monthly or less      Family History   Problem Relation Age of Onset     Ovarian cancer Maternal Grandmother 30     Cervical cancer Maternal Grandmother      Rheumatic fever Maternal Grandmother      Heart disease Maternal Grandmother      Esophageal cancer Father      Lung cancer Father      Brain cancer Maternal Grandfather      Breast cancer Other      No Medical Problems Mother      Asthma Son      Anxiety disorder Son      Social History     Substance and  "Sexual Activity   Drug Use No        Objective     /62   Pulse 64   Temp 97.1  F (36.2  C) (Oral)   Ht 5' 6.5\" (1.689 m)   Wt 208 lb (94.3 kg)   BMI 33.07 kg/m    Physical Exam    GENERAL APPEARANCE: healthy, alert and no distress     EYES: EOMI, PERRL     HENT: ear canals and TM's normal and nose and mouth without ulcers or lesions     NECK: no adenopathy, no asymmetry, masses, or scars and thyroid normal to palpation     RESP: lungs clear to auscultation - no rales, rhonchi or wheezes     CV: regular rates and rhythm, normal S1 S2, no S3 or S4 and no murmur, click or rub     ABDOMEN:  soft, nontender     MS: extremities normal- no gross deformities noted, no evidence of inflammation in joints, FROM in all extremities.     SKIN: no suspicious lesions or rashes     NEURO: Normal strength and tone, sensory exam grossly normal, mentation intact and speech normal     PSYCH: mentation appears normal. and affect normal/bright     LYMPHATICS: No cervical adenopathy    Recent Labs   Lab Test 03/24/21  1120   HGB 13.9         K 4.2   CREATININE 0.75        PRE-OP Diagnostics:   No labs were ordered during this visit.  No EKG required, no history of coronary heart disease, significant arrhythmia, peripheral arterial disease or other structural heart disease.    REVISED CARDIAC RISK INDEX (RCRI)   The patient has the following serious cardiovascular risks for perioperative complications:   - No serious cardiac risks = 0 points    RCRI INTERPRETATION: 0 points: Class I (very low risk - 0.4% complication rate)         Signed Electronically by: Dorota Guzman DO    Copy of this evaluation report is provided to requesting physician.              "

## 2021-06-24 NOTE — TELEPHONE ENCOUNTER
Name of caller: Patient  Phone number where you may be reached: [phone]  Reason for call: Pt requested a refill for citalopram at 2 on 2/14 but the pharmacy has not gotten anything. Pt's insurance requires a 90 day supply and pt would like the refill sent to [facility] in [place]. Please call pt to let her know when refill has been sent.  Best time to call back: any  If we don't reach you, is it okay to leave a detailed message? yes

## 2021-06-24 NOTE — PROGRESS NOTES
"Annual GYN Exam    Subjective:      Komal Parra is a 33 y.o. female who presents for an annual exam. She is a patient that is known to the [facility]. She reports feeling well. Reports increase cramping and flow with menses since she turned 31. \"The first two days I have to change my pad every 45-60 minutes\". Periods come every 30 days and last 3-4 days. She experiences bloating and breast pain before periods. She uses ibuprofen, heat and massage for relief. Patient has a healthy diet and fluid intake. She runs and lifts weights 5-6 days a week for exercise. Patient reports joint pain has decreased and mostly feels pain in hands. No significant changes in her personal or family history in the last year.      Allergies  Ibuprofen    Current Medications  Current Outpatient Medications   Medication Sig Dispense Refill     cholecalciferol, vitamin D3, (VITAMIN D3) 2,000 unit capsule Take 2,000 Units by mouth daily.       citalopram (CELEXA) 20 MG tablet Take 1 tablet (20 mg total) by mouth every morning. 30 tablet 12     magnesium 250 mg Tab Take 1 tablet by mouth daily.       multivitamin therapeutic tablet Take 1 tablet by mouth daily.       omega-3 fatty acids-fish oil 300-1,000 mg cap Take by mouth.       No current facility-administered medications for this visit.        Past Medical History  Past Medical History:   Diagnosis Date     Asthma     exercise induced     KELLEY (generalized anxiety disorder)     Celexa 20 mg daily     IBS (irritable bowel syndrome)      Varicella      Immunization History   Administered Date(s) Administered     DTP 1986, 1986, 01/20/1987, 02/04/1988, 07/05/1991     DTP / HiB 1986, 1986, 01/20/1987, 02/04/1988, 07/05/1991     DTaP, historic 1986, 1986, 01/20/1987, 02/04/1988, 07/05/1991     HIB (PRP-D) 09/25/1989     Hep B, Peds or Adolescent 08/01/1996, 02/20/1997, 08/29/1997     Hep B, historic 08/01/1996, 08/29/1996, 02/20/1997, " 1997     HiB, historic,unspecified 1986, 1986, 1987, 1988, 1989     IPV 1986, 1986, 1988, 1991     Influenza, inj, historic,unspecified 10/09/2013, 10/08/2014     Influenza,seasonal quad, PF, 36+MOS 10/03/2016     MMR 10/23/1987, 1997     Meningococcal MCV4 Conjugate,Unspecified 2004     Meningococcal,Historic,Unknown serogroups 2004     OPV,Trivalent,Historic([id number] only) 1986, 1986, 1988, 1991     Td,adult,historic,unspecified 1997     Tdap 2007, 2017       Obstetric and Gynecologic History  Patient's last menstrual period was 2019.  OB History    Para Term  AB Living   2 2 2     2   SAB TAB Ectopic Multiple Live Births           2      # Outcome Date GA Lbr Bentley/2nd Weight Sex Delivery Anes PTL Lv   2 Term 14 40w0d  6 lb 12 oz (3.062 kg) M Vag-Spont   BRANDY      Birth Comments: BF x 14 months   1 Term 10/01/11 40w0d  6 lb 9 oz (2.977 kg) M Vag-Spont   BRANDY      Birth Comments: PUPPP, second degree perineal laceration, BF x 10 months        Last Pap: 12/03/15. Results were: normal  Last mammogram:  for lump. Results were: normal    Immunization History  Immunization status: up to date and documented.    Family History  Family History   Problem Relation Age of Onset     Ovarian cancer Maternal Grandmother 30     Cervical cancer Maternal Grandmother      Rheumatic fever Maternal Grandmother      Heart disease Maternal Grandmother      Esophageal cancer Father      Lung cancer Father      Brain cancer Maternal Grandfather      Breast cancer Other        Health MaintenanceHealthy Diet: Yes  Regular Exercise: Yes  Sunscreen Use: Yes  Dental Visits Regularly: Yes  Seat Belt: Yes  Sexually active: Yes  Self Breast Exam Monthly:Yes  The patient reports that there is no domestic violence in her life.   Guns at Home:  Yes  Guns Safety Locks:  Yes        Social History  Past  "Surgical History:   Procedure Laterality Date     WISDOM TOOTH EXTRACTION       Social History     Socioeconomic History     Marital status:      Spouse name: Not on file     Number of children: 2     Years of education: 18     Highest education level: Not on file   Social Needs     Financial resource strain: Not on file     Food insecurity - worry: Not on file     Food insecurity - inability: Not on file     Transportation needs - medical: Not on file     Transportation needs - non-medical: Not on file   Occupational History     Occupation:      Employer: [employer]   Tobacco Use     Smoking status: Never Smoker     Smokeless tobacco: Never Used   Substance and Sexual Activity     Alcohol use: Yes     Drug use: No     Sexual activity: Yes     Partners: Male     Birth control/protection: Other   Other Topics Concern     Not on file   Social History Narrative     Not on file     The patient is sexually active  Contraception: none    Further Screening History  Hemoccults: N/A  Flex Sig: N/A  Colonoscopy: N/A  Lipid Profile: N/A  Glucose Screen: N/A  Prevention of Osteoporosis: N/A    Review of Systems  General:  Denies problems, Cardiovascular:  Denies problems, Gastrointestinal:  Denies problems, Genitourinary:  Denies problems and Psychiatric:  Denies problems       Objective:         Vitals:    02/14/19 1234   BP: 108/62   Pulse: 72   Weight: 200 lb (90.7 kg)   Height: 5' 6.5\" (1.689 m)       Physical Exam:  General: Pleasant, articulate, well-groomed, well-nourished female.  Not in any apparent distress.  Neck: Supple.  Thyroid: Small, symmetrical, no nodules noted.  Lymphadenopathy: Negative.  Lungs: Clear to auscultation bilaterally, and a nonlabored breathing pattern.  Cardio: Regular rate and rhythm, no murmur.   Breasts: Symmetrical, nontender, negative lymphadenopathy, negative nipple discharge. Thickened fibrocystic area bilaterally.   Abdomen: Soft, nontender, no masses, negative " CVAT.  External genitalia: Normal hair distribution, no lesions.  Urethral opening: Without lesions, or tenderness.   Bladder: Without masses, or tenderness.  Vagina: Pink, rugated, normal-appearing discharge.  Cervix: pink, smooth, no lesions.  Pap smear obtained.  Bimanual: Finds uterus small, mobile, nontender, no masses.  Negative CMT.  Adnexa, without masses or tenderness.    Lower extremities: +2 reflexes, no significant edema.    Assessment     1) Annual health maintenance exam generally within normal limits today.   2) Dysfunctional uterine bleeding- heavy menses   3) Dysmenorrhea   4) Contraception Management,  s/p vasectomy  5) Generalized anxiety disorder   6.) Cervical Cancer Screening    Plan:  1) Pap smear done today. HPV co-testing discussed with that pap, then paps q 5 yrs if both negative.  2) Discussed nutrition and exercise.  Advised Multivitamin, Vitamin D3 2157-9624 IU geltab and an omega 3 supplement daily. Discussed importance of calcium.   3. Discussed risks and benefits to Mirena IUD. Pamphlet given to patient.   4. Labs include hemoglobin, thyroid cascade  5. Breast awareness reviewed and patient encouraged to contact her provider with concerns.   6. Continue Celexa as directed.  7.  RTC 1 year for annual physical exam, PRN    Patient was seen with student, PAOLA Lao who was present for learning. I personally assessed, examined and made clinical decisions reflected in the documentation.

## 2021-06-25 NOTE — TELEPHONE ENCOUNTER
Encourage patient to make a clinic appointment for further evaluation.  She agrees with plan of care.

## 2021-06-25 NOTE — TELEPHONE ENCOUNTER
Name of caller: Patient  Phone number where you may be reached: [phone]  Reason for call: pt first said she was having some chest or breast pain, but it is not really chest pain and she can breathe fine,  but feels almost like a plugged duct would feel but she has not  in about 3 years. Pls call to discuss.  Best time to call back: asap  If we don't reach you, is it okay to leave a detailed message? No, nds to discuss

## 2021-07-04 NOTE — ADDENDUM NOTE
Addendum Note by Sudheer Rooney DO at 3/24/2021 10:20 AM     Author: Sudheer Rooney DO Service: -- Author Type: Physician    Filed: 3/24/2021 12:42 PM Encounter Date: 3/24/2021 Status: Signed    : Sudheer Rooney DO (Physician)    Addended by: SUDHEER ROONEY on: 3/24/2021 12:42 PM        Modules accepted: Orders

## 2021-08-05 DIAGNOSIS — K21.00 GASTROESOPHAGEAL REFLUX DISEASE WITH ESOPHAGITIS WITHOUT HEMORRHAGE: ICD-10-CM

## 2021-08-05 RX ORDER — OMEPRAZOLE 40 MG/1
CAPSULE, DELAYED RELEASE ORAL
Qty: 120 CAPSULE | Refills: 0 | Status: CANCELLED | OUTPATIENT
Start: 2021-08-05 | End: 2021-11-03

## 2021-08-06 ENCOUNTER — TELEPHONE (OUTPATIENT)
Dept: GASTROENTEROLOGY | Facility: CLINIC | Age: 36
End: 2021-08-06

## 2021-08-06 DIAGNOSIS — K21.00 GASTROESOPHAGEAL REFLUX DISEASE WITH ESOPHAGITIS WITHOUT HEMORRHAGE: ICD-10-CM

## 2021-08-06 DIAGNOSIS — F41.9 ANXIETY: Primary | ICD-10-CM

## 2021-08-06 DIAGNOSIS — R19.5 LOOSE STOOLS: Primary | ICD-10-CM

## 2021-08-06 RX ORDER — OMEPRAZOLE 40 MG/1
40 CAPSULE, DELAYED RELEASE ORAL DAILY
Qty: 30 CAPSULE | Refills: 2 | Status: SHIPPED | OUTPATIENT
Start: 2021-08-06 | End: 2021-09-14

## 2021-08-06 RX ORDER — CITALOPRAM HYDROBROMIDE 20 MG/1
20 TABLET ORAL DAILY
Qty: 30 TABLET | Refills: 0 | Status: SHIPPED | OUTPATIENT
Start: 2021-08-06 | End: 2021-08-09

## 2021-08-06 NOTE — TELEPHONE ENCOUNTER
[facility] Call Center    Phone Message    May a detailed message be left on voicemail: yes     Reason for Call: Medication Refill Request    Has the patient contacted the pharmacy for the refill? Yes   Name of medication being requested: omeprazole (PRILOSEC) 40 MG DR capsule   Provider who prescribed the medication: Stephanie Hernandez  Pharmacy: [facility] [id number] [address]  Date medication is needed: ASAP    Per pt only has 3 pills left. The pharmacy told the pt that the prescription has one more refill on it but they told her it has . Please call pt back if any questions. Thank you.       Action Taken: Message routed to:  [facility] ([facility] Gastro    Travel Screening: Not Applicable

## 2021-08-06 NOTE — TELEPHONE ENCOUNTER
Spoke with pt, she is only taking 40mg once daily and it has significantly improved her cough symptoms. She would like her medication renewed and referral sent to meet with dietitian. Per Stephanie Hernandez's last note pt could meet with dietitian to discuss FODMAP diet. Pt would like that order placed for the nutrition referral as well.

## 2021-08-06 NOTE — TELEPHONE ENCOUNTER
Patient states she has called her pharmacy 3 times requesting her citalopram to be refilled. Informed patient I would sent this to the covering provider for review, as her pcp is out of the clinic today. Patient is hoping to have this medication refilled by the weekend as she will be out of this medication. Covering provider please advise.

## 2021-08-09 ENCOUNTER — MYC MEDICAL ADVICE (OUTPATIENT)
Dept: FAMILY MEDICINE | Facility: CLINIC | Age: 36
End: 2021-08-09

## 2021-08-09 DIAGNOSIS — F41.9 ANXIETY: ICD-10-CM

## 2021-08-09 RX ORDER — CITALOPRAM HYDROBROMIDE 20 MG/1
20 TABLET ORAL DAILY
Qty: 90 TABLET | Refills: 2 | Status: SHIPPED | OUTPATIENT
Start: 2021-08-09 | End: 2021-08-26

## 2021-08-09 NOTE — TELEPHONE ENCOUNTER
She had a physical with you in march and a pre op in may. Are you ok with continuing to refill without a visit? Or should she be seen? Looks like Dr Gibbons said she needed to be seen because there was no PHQ or KELLEY documented?

## 2021-08-09 NOTE — TELEPHONE ENCOUNTER
First Attempt: I sent a my chart message to the patient to please contact our office to schedule a med check appt.

## 2021-08-26 DIAGNOSIS — F41.9 ANXIETY: ICD-10-CM

## 2021-08-26 RX ORDER — CITALOPRAM HYDROBROMIDE 20 MG/1
20 TABLET ORAL DAILY
Qty: 90 TABLET | Refills: 2 | Status: SHIPPED | OUTPATIENT
Start: 2021-08-26 | End: 2022-11-06

## 2021-09-14 ENCOUNTER — VIRTUAL VISIT (OUTPATIENT)
Dept: GASTROENTEROLOGY | Facility: CLINIC | Age: 36
End: 2021-09-14
Payer: COMMERCIAL

## 2021-09-14 VITALS — HEIGHT: 67 IN | BODY MASS INDEX: 29.76 KG/M2

## 2021-09-14 DIAGNOSIS — R05.9 COUGH: ICD-10-CM

## 2021-09-14 DIAGNOSIS — K21.00 GASTROESOPHAGEAL REFLUX DISEASE WITH ESOPHAGITIS WITHOUT HEMORRHAGE: Primary | ICD-10-CM

## 2021-09-14 PROCEDURE — 99214 OFFICE O/P EST MOD 30 MIN: CPT | Mod: GT | Performed by: PHYSICIAN ASSISTANT

## 2021-09-14 RX ORDER — OMEPRAZOLE 40 MG/1
40 CAPSULE, DELAYED RELEASE ORAL DAILY
Qty: 90 CAPSULE | Refills: 3 | Status: SHIPPED | OUTPATIENT
Start: 2021-09-14 | End: 2021-09-20

## 2021-09-14 ASSESSMENT — PAIN SCALES - GENERAL: PAINLEVEL: NO PAIN (0)

## 2021-09-14 NOTE — PROGRESS NOTES
"Komal Parra is a 36 year old female who is being evaluated via a billable video visit.      The patient has been notified of following:     \"This video visit will be conducted via a call between you and your physician/provider. We have found that certain health care needs can be provided without the need for an in-person physical exam.  This service lets us provide the care you need with a video conversation.  If a prescription is necessary we can send it directly to your pharmacy.  If lab work is needed we can place an order for that and you can then stop by our lab to have the test done at a later time.    If during the course of the call the physician/provider feels a video visit is not appropriate, you will not be charged for this service.\"     Patient confirmed that they are in [place] for today's visit YES    How would you like to obtain your AVS? MyChart  If the video visit is dropped, the invitation should be resent by: Text to cell phone: [phone]  Will anyone else be joining your video visit? No    Video-Visit Details  Type of service:  Video Visit    Video Start Time: 5:59 PM  Video End Time:  6:25 PM    Originating Location (pt. Location): Home    Distant Location (provider location):  [facility] [place]     Platform used: [facility]      GI CLINIC VISIT    CC/REFERRING MD:  Referred Self  REASON FOR CONSULTATION: chronic cough    ASSESSMENT/PLAN:  1. GERD, Chronic cough   Patient reports 2 years of chronic cough certain foods including lettuce, certain types of wheat.  Has been seen by ENT and allergy without a source of symptoms.  Does have significant family history of GERD and father with esophageal cancer.  Bravo odd PPI showed positive study with possible correlation with throat closing and acid reflux events. No correlation with heartburn and cough, study was truncated as the probe was dislodged. DeMeester average was 28.3. Though no association was " "found between GERD and cough, her study demonstrates underlying reflux.     She overall does feel better with omeprazole 40 mg a day. Should continue at this time. Will plan to re-evaluate for healing of esophagitis with EGD. If continued esophagitis, may need to continue BID dosing. If resolved, could consider decreasing to 20 mg and monitoring symptoms.     Can continue modified low FOMDAP diet which has been helping her underlying IBS symptoms and GERD. Future considerations include meeting with GI dietitian.   --Omeprazole 40 once daily  -- EGD to assess for healing   GERD Lifestyle Modifications  -- Avoid foods high in fat or high fructose corn syrup  -- do not eat within three hours of laying down or going to bed  -- raise the head of your bed 6-8 inches  -- avoid alcohol  -- aim for BMI of less than <25 and lose extra weight as needed    RTC 2 months     Thank you for this consultation.  It was a pleasure to participate in the care of this patient; please contact us with any further questions.     38 Minutes was spent on the date of the encounter during chart review, history and exam, documentation, and further activities as noted       Stephanie Hernandez PA-C  Division of Gastroenterology, Hepatology & Nutrition  [facility]        HPI  Komal Parra is a 35 year old presenting for chronic cough. Patient reports a chronic cough for 2 years. Symptoms do seem to be getting worse with time.     She has previously been seen by an allergist ([facility] in [place]- gluten, lettuce, preservative allergies were negative) and an ENT, a dietitian also suggested yeast overgrowth. Also notes lettuce seems to be a trigger. She notes that symptoms went away for a while (early April for 2 weeks), but that they came back \"with a vengence\". Symptoms seem to be year round. She also describes sensation of \"lump in her throat\" when she swallows that feels \"mucusy\". She states this is dry cough- and can " last for hours after eating. When she coughs, she does not necessarily feal better but she always has this urge to cough. She has been on PPI with pantoprazole which did not help after two months, Flonase did not help. She does get some GERD symptoms related to food triggers. She occasionally will have dysphagia to solid foods if she does not chew properly. No dysphagia to liquids.  Foods that are high in wheat seem to trigger symptoms. She reports that she can typically tolerate white bread, but what bread can make things worse.     She notes that her dad had really severe GERD and  of esophageal cancer, her sister had severe GERD as well but this improved.     She does note SOB- and feels like she cannot breathe. She will try taking an inhaler- does not make much of a difference. She is able to run without coughing, but with other types of workouts can trigger symptoms.     Notes IBS- cannot eat fried foods, has had this for 20 years. In the beginning of April she was constipated, and she was not coughing during this time. Can be up to four times a day. Will have urgency with bowel movements.     Interval History 2021:   Was switched from pantoprazole to omeprazole. She took 40 mg BID for one month, then reduced to once daily for a month. Overall is doing well with this.     Has been doing the low FODMAP diet which she has found helpful. Things that are high in sugar do seem to agitate her cough, and irritates her IBS. She is taking omeprazole at bedtime    PROBLEM LIST  Patient Active Problem List    Diagnosis Date Noted     Indication for care in labor or delivery 2014     Priority: Medium       PERTINENT PAST MEDICAL HISTORY:  Anxiety     PREVIOUS SURGERIES:  Past Surgical History:   Procedure Laterality Date     HC TOOTH EXTRACTION W/FORCEP       WISDOM TOOTH EXTRACTION         PREVIOUS ENDOSCOPY:  Impression:          - LA Grade B reflux esophagitis.                        - Normal stomach.                         - Normal examined duodenum.                        - The BRAVO pH capsule was deployed.                        - No specimens collected.   ALLERGIES:     Allergies   Allergen Reactions     Ibuprofen GI Disturbance       PERTINENT MEDICATIONS:    Current Outpatient Medications:      citalopram (CELEXA) 20 MG tablet, Take 1 tablet (20 mg) by mouth daily, Disp: 90 tablet, Rfl: 2     omeprazole (PRILOSEC) 40 MG DR capsule, Take 1 capsule (40 mg) by mouth daily, Disp: 30 capsule, Rfl: 2     Prenatal Vit-Fe Fumarate-FA (PRENATAL MULTIVITAMIN  PLUS IRON) 27-0.8 MG TABS, Take 1 tablet by mouth daily, Disp: , Rfl:    Avoids NSAIDs     SOCIAL HISTORY:  No alcohol use or drug use   Social History     Socioeconomic History     Marital status:      Spouse name: Not on file     Number of children: Not on file     Years of education: Not on file     Highest education level: Not on file   Occupational History     Not on file   Tobacco Use     Smoking status: Never Smoker     Smokeless tobacco: Never Used   Substance and Sexual Activity     Alcohol use: No     Drug use: No     Sexual activity: Yes     Partners: Male     Birth control/protection: None   Other Topics Concern     Parent/sibling w/ CABG, MI or angioplasty before 65F 55M? Not Asked   Social History Narrative     Not on file     Social Determinants of Health     Financial Resource Strain:      Difficulty of Paying Living Expenses:    Food Insecurity:      Worried About Running Out of Food in the Last Year:      Ran Out of Food in the Last Year:    Transportation Needs:      Lack of Transportation (Medical):      Lack of Transportation (Non-Medical):    Physical Activity:      Days of Exercise per Week:      Minutes of Exercise per Session:    Stress:      Feeling of Stress :    Social Connections:      Frequency of Communication with Friends and Family:      Frequency of Social Gatherings with Friends and Family:      Attends Yarsanism  Services:      Active Member of Clubs or Organizations:      Attends Club or Organization Meetings:      Marital Status:    Intimate Partner Violence:      Fear of Current or Ex-Partner:      Emotionally Abused:      Physically Abused:      Sexually Abused:        FAMILY HISTORY:  FH of CRC: none   Mother with pre-cancerous polyps   FH of IBD: none   Dad with bad reflux and esophageal cancer   Father's biological family with esophageal cancer and GERD.     Past/family/social history reviewed and no changes    PHYSICAL EXAMINATION:  General appearance: Healthy appearing adult, in no acute distress  Eyes: Sclera anicteric  Ears, nose, mouth and throat: No obvious external lesions of ears and nose, Hearing intact  Neck: Symmetric, No obvious external lesions  Respiratory: Normal respiration, no use of accessory muscles   Skin: No rashes or jaundice   Psychiatric: Oriented to person, place and time, Appropriate mood and affect.     PERTINENT STUDIES:  No results found for any previous visit.     BRAVO study  -- positive study with possible correlation with throat closing and acid reflux events. No correlation with heartburn and cough, study was truncated as the probe was dislodged. DeMeester average was 28.3

## 2021-09-14 NOTE — NURSING NOTE
"Chief Complaint   Patient presents with     Follow Up     Follow up for GERD       Vitals:    09/14/21 1705   Height: 5' 7\"       Body mass index is 29.76 kg/m .          Ailyn Wallace CMA    "

## 2021-09-14 NOTE — LETTER
9/14/2021         RE: Komal Parra  [address] [facility] [place] [address]        Dear Colleague,    Thank you for referring your patient, Komal Parra, to the [facility] [place]. Please see a copy of my visit note below.    GI CLINIC VISIT    CC/REFERRING MD:  Referred Self  REASON FOR CONSULTATION: chronic cough    ASSESSMENT/PLAN:  1. GERD, Chronic cough   Patient reports 2 years of chronic cough certain foods including lettuce, certain types of wheat.  Has been seen by ENT and allergy without a source of symptoms.  Does have significant family history of GERD and father with esophageal cancer.  Bravo odd PPI showed positive study with possible correlation with throat closing and acid reflux events. No correlation with heartburn and cough, study was truncated as the probe was dislodged. De[place]eester average was 28.3. Though no association was found between GERD and cough, her study demonstrates underlying reflux.     She overall does feel better with omeprazole 40 mg a day. Should continue at this time. Will plan to re-evaluate for healing of esophagitis with EGD. If continued esophagitis, may need to continue BID dosing. If resolved, could consider decreasing to 20 mg and monitoring symptoms.     Can continue modified low FOMDAP diet which has been helping her underlying IBS symptoms and GERD. Future considerations include meeting with GI dietitian.   --Omeprazole 40 once daily  -- EGD to assess for healing   GERD Lifestyle Modifications  -- Avoid foods high in fat or high fructose corn syrup  -- do not eat within three hours of laying down or going to bed  -- raise the head of your bed 6-8 inches  -- avoid alcohol  -- aim for BMI of less than <25 and lose extra weight as needed    RTC 2 months     Thank you for this consultation.  It was a pleasure to participate in the care of this patient; please contact us with any further questions.     38 Minutes was spent  "on the date of the encounter during chart review, history and exam, documentation, and further activities as noted       Stephanie Hernandez PA-C  Division of Gastroenterology, Hepatology & Nutrition  [facility]        HPI  Komal Parra is a 35 year old presenting for chronic cough. Patient reports a chronic cough for 2 years. Symptoms do seem to be getting worse with time.     She has previously been seen by an allergist ([facility] in [place]- gluten, lettuce, preservative allergies were negative) and an ENT, a dietitian also suggested yeast overgrowth. Also notes lettuce seems to be a trigger. She notes that symptoms went away for a while (early April for 2 weeks), but that they came back \"with a vengence\". Symptoms seem to be year round. She also describes sensation of \"lump in her throat\" when she swallows that feels \"mucusy\". She states this is dry cough- and can last for hours after eating. When she coughs, she does not necessarily feal better but she always has this urge to cough. She has been on PPI with pantoprazole which did not help after two months, Flonase did not help. She does get some GERD symptoms related to food triggers. She occasionally will have dysphagia to solid foods if she does not chew properly. No dysphagia to liquids.  Foods that are high in wheat seem to trigger symptoms. She reports that she can typically tolerate white bread, but what bread can make things worse.     She notes that her dad had really severe GERD and  of esophageal cancer, her sister had severe GERD as well but this improved.     She does note SOB- and feels like she cannot breathe. She will try taking an inhaler- does not make much of a difference. She is able to run without coughing, but with other types of workouts can trigger symptoms.     Notes IBS- cannot eat fried foods, has had this for 20 years. In the beginning of April she was constipated, and she was not coughing during this " time. Can be up to four times a day. Will have urgency with bowel movements.     Interval History 9/14/2021:   Was switched from pantoprazole to omeprazole. She took 40 mg BID for one month, then reduced to once daily for a month. Overall is doing well with this.     Has been doing the low FODMAP diet which she has found helpful. Things that are high in sugar do seem to agitate her cough, and irritates her IBS. She is taking omeprazole at bedtime    PROBLEM LIST  Patient Active Problem List    Diagnosis Date Noted     Indication for care in labor or delivery 01/17/2014     Priority: Medium       PERTINENT PAST MEDICAL HISTORY:  Anxiety     PREVIOUS SURGERIES:  Past Surgical History:   Procedure Laterality Date     HC TOOTH EXTRACTION W/FORCEP  2002     WISDOM TOOTH EXTRACTION         PREVIOUS ENDOSCOPY:  Impression:          - LA Grade B reflux esophagitis.                        - Normal stomach.                        - Normal examined duodenum.                        - The BRAVO pH capsule was deployed.                        - No specimens collected.   ALLERGIES:     Allergies   Allergen Reactions     Ibuprofen GI Disturbance       PERTINENT MEDICATIONS:    Current Outpatient Medications:      citalopram (CELEXA) 20 MG tablet, Take 1 tablet (20 mg) by mouth daily, Disp: 90 tablet, Rfl: 2     omeprazole (PRILOSEC) 40 MG DR capsule, Take 1 capsule (40 mg) by mouth daily, Disp: 30 capsule, Rfl: 2     Prenatal Vit-Fe Fumarate-FA (PRENATAL MULTIVITAMIN  PLUS IRON) 27-0.8 MG TABS, Take 1 tablet by mouth daily, Disp: , Rfl:    Avoids NSAIDs     SOCIAL HISTORY:  No alcohol use or drug use   Social History     Socioeconomic History     Marital status:      Spouse name: Not on file     Number of children: Not on file     Years of education: Not on file     Highest education level: Not on file   Occupational History     Not on file   Tobacco Use     Smoking status: Never Smoker     Smokeless tobacco: Never Used    Substance and Sexual Activity     Alcohol use: No     Drug use: No     Sexual activity: Yes     Partners: Male     Birth control/protection: None   Other Topics Concern     Parent/sibling w/ CABG, MI or angioplasty before 65F 55M? Not Asked   Social History Narrative     Not on file     Social Determinants of Health     Financial Resource Strain:      Difficulty of Paying Living Expenses:    Food Insecurity:      Worried About Running Out of Food in the Last Year:      Ran Out of Food in the Last Year:    Transportation Needs:      Lack of Transportation (Medical):      Lack of Transportation (Non-Medical):    Physical Activity:      Days of Exercise per Week:      Minutes of Exercise per Session:    Stress:      Feeling of Stress :    Social Connections:      Frequency of Communication with Friends and Family:      Frequency of Social Gatherings with Friends and Family:      Attends Oriental orthodox Services:      Active Member of Clubs or Organizations:      Attends Club or Organization Meetings:      Marital Status:    Intimate Partner Violence:      Fear of Current or Ex-Partner:      Emotionally Abused:      Physically Abused:      Sexually Abused:        FAMILY HISTORY:  FH of CRC: none   Mother with pre-cancerous polyps   FH of IBD: none   Dad with bad reflux and esophageal cancer   Father's biological family with esophageal cancer and GERD.     Past/family/social history reviewed and no changes    PHYSICAL EXAMINATION:  General appearance: Healthy appearing adult, in no acute distress  Eyes: Sclera anicteric  Ears, nose, mouth and throat: No obvious external lesions of ears and nose, Hearing intact  Neck: Symmetric, No obvious external lesions  Respiratory: Normal respiration, no use of accessory muscles   Skin: No rashes or jaundice   Psychiatric: Oriented to person, place and time, Appropriate mood and affect.     PERTINENT STUDIES:  No results found for any previous visit.     BRAVO study  -- positive study  with possible correlation with throat closing and acid reflux events. No correlation with heartburn and cough, study was truncated as the probe was dislodged. De[place]eester average was 28.3           Again, thank you for allowing me to participate in the care of your patient.        Sincerely,    Stephanie Hernandez PA-C

## 2021-09-15 NOTE — PATIENT INSTRUCTIONS
It was a pleasure taking care of you today.  I've included a brief summary of our discussion and care plan from today's visit below.  Please review this information with your primary care provider.  ______________________________________________________________________    My recommendations are summarized as follows:    --Omeprazole 40 once daily  -- EGD to assess for healing   GERD Lifestyle Modifications  -- Avoid foods high in fat or high fructose corn syrup  -- do not eat within three hours of laying down or going to bed  -- raise the head of your bed 6-8 inches  -- avoid alcohol  -- aim for BMI of less than <25 and lose extra weight as needed  -- please see scheduling information provided below     Return to GI Clinic in 2 months to review your progress.    ______________________________________________________________________    How do I schedule labs, imaging studies, or procedures that were ordered in clinic today?     Labs: To schedule lab appointment at the [facility], use my chart or call [phone]. If you have a [facility] lab closer to home where you are regularly seen you can give them a call.     Procedures: If a colonoscopy, upper endoscopy, breath test, esophageal manometry, or pH impedence was ordered today, our endoscopy team will call you to schedule this. If you have not heard from our endoscopy team within a week, please call [phone] to schedule.     Imaging Studies: If you were scheduled for a CT scan, X-ray, MRI, ultrasound, HIDA scan or other imaging study, please call [phone] to have this scheduled.     Referral: If a referral to another specialty was ordered, expect a phone call or follow instructions above. If you have not heard from anyone regarding your referral in a week, please call our clinic to check the status.     Who do I call with any questions after my visit?  Please be in touch if there are any further questions that arise following today's visit.   There are multiple ways to contact your gastroenterology care team.        During business hours, you may reach a Gastroenterology nurse at [phone]      To schedule or reschedule an appointment, please call [phone].       You can always send a secure message through Escapio.  Escapio messages are answered by your nurse or doctor typically within 24 hours.  Please allow extra time on weekends and holidays.        For urgent/emergent questions after business hours, you may reach the on-call GI Fellow by contacting the [facility]  at [phone].     How will I get the results of any tests ordered?    You will receive all of your results.  If you have signed up for Hotlistt, any tests ordered at your visit will be available to you after your physician reviews them.  Typically this takes 1-2 weeks.  If there are urgent results that require a change in your care plan, your physician or nurse will call you to discuss the next steps.      What is Escapio?  Escapio is a secure way for you to access all of your healthcare records from the [facility].  It is a web based computer program, so you can sign on to it from any location.  It also allows you to send secure messages to your care team.  I recommend signing up for Escapio access if you have not already done so and are comfortable with using a computer.      How to I schedule a follow-up visit?  If you did not schedule a follow-up visit today, please call [phone] to schedule a follow-up office visit.      Sincerely,    Stephanie Hernandez PA-C  Division of Gastroenterology, Hepatology & Nutrition  [facility]

## 2021-09-17 ENCOUNTER — OFFICE VISIT (OUTPATIENT)
Dept: FAMILY MEDICINE | Facility: CLINIC | Age: 36
End: 2021-09-17
Payer: COMMERCIAL

## 2021-09-17 ENCOUNTER — TELEPHONE (OUTPATIENT)
Dept: GASTROENTEROLOGY | Facility: CLINIC | Age: 36
End: 2021-09-17

## 2021-09-17 VITALS
HEIGHT: 67 IN | SYSTOLIC BLOOD PRESSURE: 114 MMHG | WEIGHT: 202.6 LBS | TEMPERATURE: 96.6 F | RESPIRATION RATE: 16 BRPM | DIASTOLIC BLOOD PRESSURE: 67 MMHG | BODY MASS INDEX: 31.8 KG/M2 | HEART RATE: 58 BPM | OXYGEN SATURATION: 99 %

## 2021-09-17 DIAGNOSIS — Z01.818 PREOPERATIVE EXAMINATION: Primary | ICD-10-CM

## 2021-09-17 DIAGNOSIS — Z11.59 ENCOUNTER FOR SCREENING FOR OTHER VIRAL DISEASES: ICD-10-CM

## 2021-09-17 DIAGNOSIS — R05.3 CHRONIC COUGH: ICD-10-CM

## 2021-09-17 DIAGNOSIS — K21.9 GASTROESOPHAGEAL REFLUX DISEASE WITHOUT ESOPHAGITIS: ICD-10-CM

## 2021-09-17 DIAGNOSIS — Z01.818 PREOP GENERAL PHYSICAL EXAM: ICD-10-CM

## 2021-09-17 PROCEDURE — 99214 OFFICE O/P EST MOD 30 MIN: CPT | Performed by: FAMILY MEDICINE

## 2021-09-17 PROCEDURE — U0003 INFECTIOUS AGENT DETECTION BY NUCLEIC ACID (DNA OR RNA); SEVERE ACUTE RESPIRATORY SYNDROME CORONAVIRUS 2 (SARS-COV-2) (CORONAVIRUS DISEASE [COVID-19]), AMPLIFIED PROBE TECHNIQUE, MAKING USE OF HIGH THROUGHPUT TECHNOLOGIES AS DESCRIBED BY CMS-2020-01-R: HCPCS | Performed by: FAMILY MEDICINE

## 2021-09-17 PROCEDURE — U0005 INFEC AGEN DETEC AMPLI PROBE: HCPCS | Performed by: FAMILY MEDICINE

## 2021-09-17 ASSESSMENT — MIFFLIN-ST. JEOR: SCORE: 1642.65

## 2021-09-17 NOTE — PATIENT INSTRUCTIONS

## 2021-09-17 NOTE — PROGRESS NOTES
[facility]  [address] [facility] [address]  Phone: [phone]  Fax: [phone]  Primary Provider: Dorota Guzman  Pre-op Performing Provider: KODI DO      PREOPERATIVE EVALUATION:  Today's date: 9/17/2021    Komal Parra is a 36 year old female who presents for a preoperative evaluation.    Surgical Information:  Surgery/Procedure: Endoscopy  Surgery Location: [facility]  Surgeon: TBD  Surgery Date: 09/20/2021  Time of Surgery: 10:45am  Where patient plans to recover: At home with family  Fax number for surgical facility: [phone]    Type of Anesthesia Anticipated: General    Assessment & Plan     The proposed surgical procedure is considered LOW risk.    Preoperative examination  Chronic cough  Gastroesophageal reflux disease without esophagitis    Patient is approved for the procedure including an upper endoscopy  Covid test is pending  No laboratory testing is needed at this time  Recommend avoiding NSAIDs and OTC supplements prior to her procedure  Follow-up as recommended by gastroenterology  - Asymptomatic COVID-19 Virus (Coronavirus) by PCR; Future         Risks and Recommendations:  The patient has the following additional risks and recommendations for perioperative complications:   - No identified additional risk factors other than previously addressed    Medication Instructions:  Patient is to take all scheduled medications on the day of surgery    RECOMMENDATION:  APPROVAL GIVEN to proceed with proposed procedure, without further diagnostic evaluation.    Review of external notes as documented above           Subjective     HPI related to upcoming procedure:     This is a pleasant 36-year-old female who has a known history of a chronic cough and esophageal reflux symptoms who presents for preoperative evaluation.  She will have an upper endoscopy for reevaluation.  She has had a previous upper endoscopy and is  due for a recheck.  She has been taking omeprazole 40 mg daily which has been helpful.    She otherwise takes citalopram given history of anxiety.    Overall, she has felt well.  She has tolerated anesthesia previously.  She denies any recent health concerns.  She has no concerning heart history.  She has no known history of asthma.  She had laboratory testing in the past including a normal hemoglobin.    Preop Questions 9/17/2021   1. Have you ever had a heart attack or stroke? No   2. Have you ever had surgery on your heart or blood vessels, such as a stent placement, a coronary artery bypass, or surgery on an artery in your head, neck, heart, or legs? No   3. Do you have chest pain with activity? No   4. Do you have a history of  heart failure? No   5. Do you currently have a cold, bronchitis or symptoms of other infection? No   6. Do you have a cough, shortness of breath, or wheezing? No   7. Do you or anyone in your family have previous history of blood clots? No   8. Do you or does anyone in your family have a serious bleeding problem such as prolonged bleeding following surgeries or cuts? No   9. Have you ever had problems with anemia or been told to take iron pills? YES - Most recent hemoglobin normal   10. Have you had any abnormal blood loss such as black, tarry or bloody stools, or abnormal vaginal bleeding? No   11. Have you ever had a blood transfusion? No   12. Are you willing to have a blood transfusion if it is medically needed before, during, or after your surgery? Yes   13. Have you or any of your relatives ever had problems with anesthesia? No   14. Do you have sleep apnea, excessive snoring or daytime drowsiness? No   15. Do you have any artifical heart valves or other implanted medical devices like a pacemaker, defibrillator, or continuous glucose monitor? No   16. Do you have artificial joints? No   17. Are you allergic to latex? YES:    18. Is there any chance that you may be pregnant? No        Status of Chronic Conditions:  See problem list for active medical problems.  Problems all longstanding and stable, except as noted/documented.  See ROS for pertinent symptoms related to these conditions.      Review of Systems  Constitutional, neuro, ENT, endocrine, pulmonary, cardiac, gastrointestinal, genitourinary, musculoskeletal, integument and psychiatric systems are negative, except as otherwise noted.    Patient Active Problem List    Diagnosis Date Noted     Indication for care in labor or delivery 01/17/2014     Priority: Medium      Past Medical History:   Diagnosis Date     KELLEY (generalized anxiety disorder)     Celexa 20 mg daily     IBS (irritable bowel syndrome)      Past Surgical History:   Procedure Laterality Date     HC TOOTH EXTRACTION W/FORCEP  2002     WISDOM TOOTH EXTRACTION       Current Outpatient Medications   Medication Sig Dispense Refill     citalopram (CELEXA) 20 MG tablet Take 1 tablet (20 mg) by mouth daily 90 tablet 2     omeprazole (PRILOSEC) 40 MG DR capsule Take 1 capsule (40 mg) by mouth daily 90 capsule 3     Prenatal Vit-Fe Fumarate-FA (PRENATAL MULTIVITAMIN  PLUS IRON) 27-0.8 MG TABS Take 1 tablet by mouth daily (Patient not taking: Reported on 9/17/2021)         Allergies   Allergen Reactions     Ibuprofen GI Disturbance        Social History     Tobacco Use     Smoking status: Never Smoker     Smokeless tobacco: Never Used   Substance Use Topics     Alcohol use: No     Family History   Problem Relation Age of Onset     No Known Problems Mother      Esophageal Cancer Father      Lung Cancer Father      Cancer Father      Ovarian Cancer Maternal Grandmother 30.00     Cervical Cancer Maternal Grandmother      Rheumatic fever Maternal Grandmother      Heart Disease Maternal Grandmother      Brain Cancer Maternal Grandfather      Breast Cancer Other      Asthma Son      Anxiety Disorder Son      History   Drug Use No         Objective     There were no vitals taken for  this visit.    Physical Exam    GENERAL APPEARANCE: healthy, alert and no distress     EYES: EOMI, PERRL     HENT: ear canals and TM's normal and nose and mouth without ulcers or lesions     NECK: no adenopathy, no asymmetry, masses, or scars and thyroid normal to palpation     RESP: lungs clear to auscultation - no rales, rhonchi or wheezes     CV: regular rates and rhythm, normal S1 S2, no S3 or S4 and no murmur, click or rub     MS: extremities normal- no gross deformities noted, no evidence of inflammation in joints, FROM in all extremities.     SKIN: no suspicious lesions or rashes     NEURO: Normal strength and tone, sensory exam grossly normal, mentation intact and speech normal     PSYCH: mentation appears normal. and affect normal/bright     LYMPHATICS: No cervical adenopathy    Recent Labs   Lab Test 03/24/21  1120   HGB 13.9         POTASSIUM 4.2   CR 0.75        Diagnostics:  No labs were ordered during this visit.   No EKG required for low risk surgery (cataract, skin procedure, breast biopsy, etc).    Revised Cardiac Risk Index (RCRI):  The patient has the following serious cardiovascular risks for perioperative complications:   - No serious cardiac risks = 0 points     RCRI Interpretation: 0 points: Class I (very low risk - 0.4% complication rate)           Signed Electronically by: Malcolm Lo MD  Copy of this evaluation report is provided to requesting physician.

## 2021-09-17 NOTE — H&P (VIEW-ONLY)
[facility]  [address] [facility] [address]  Phone: [phone]  Fax: [phone]  Primary Provider: Dorota Guzman  Pre-op Performing Provider: KODI DO      PREOPERATIVE EVALUATION:  Today's date: 9/17/2021    Komal Parra is a 36 year old female who presents for a preoperative evaluation.    Surgical Information:  Surgery/Procedure: Endoscopy  Surgery Location: [facility]  Surgeon: TBD  Surgery Date: 09/20/2021  Time of Surgery: 10:45am  Where patient plans to recover: At home with family  Fax number for surgical facility: [phone]    Type of Anesthesia Anticipated: General    Assessment & Plan     The proposed surgical procedure is considered LOW risk.    Preoperative examination  Chronic cough  Gastroesophageal reflux disease without esophagitis    Patient is approved for the procedure including an upper endoscopy  Covid test is pending  No laboratory testing is needed at this time  Recommend avoiding NSAIDs and OTC supplements prior to her procedure  Follow-up as recommended by gastroenterology  - Asymptomatic COVID-19 Virus (Coronavirus) by PCR; Future         Risks and Recommendations:  The patient has the following additional risks and recommendations for perioperative complications:   - No identified additional risk factors other than previously addressed    Medication Instructions:  Patient is to take all scheduled medications on the day of surgery    RECOMMENDATION:  APPROVAL GIVEN to proceed with proposed procedure, without further diagnostic evaluation.    Review of external notes as documented above           Subjective     HPI related to upcoming procedure:     This is a pleasant 36-year-old female who has a known history of a chronic cough and esophageal reflux symptoms who presents for preoperative evaluation.  She will have an upper endoscopy for reevaluation.  She has had a previous upper endoscopy and is  due for a recheck.  She has been taking omeprazole 40 mg daily which has been helpful.    She otherwise takes citalopram given history of anxiety.    Overall, she has felt well.  She has tolerated anesthesia previously.  She denies any recent health concerns.  She has no concerning heart history.  She has no known history of asthma.  She had laboratory testing in the past including a normal hemoglobin.    Preop Questions 9/17/2021   1. Have you ever had a heart attack or stroke? No   2. Have you ever had surgery on your heart or blood vessels, such as a stent placement, a coronary artery bypass, or surgery on an artery in your head, neck, heart, or legs? No   3. Do you have chest pain with activity? No   4. Do you have a history of  heart failure? No   5. Do you currently have a cold, bronchitis or symptoms of other infection? No   6. Do you have a cough, shortness of breath, or wheezing? No   7. Do you or anyone in your family have previous history of blood clots? No   8. Do you or does anyone in your family have a serious bleeding problem such as prolonged bleeding following surgeries or cuts? No   9. Have you ever had problems with anemia or been told to take iron pills? YES - Most recent hemoglobin normal   10. Have you had any abnormal blood loss such as black, tarry or bloody stools, or abnormal vaginal bleeding? No   11. Have you ever had a blood transfusion? No   12. Are you willing to have a blood transfusion if it is medically needed before, during, or after your surgery? Yes   13. Have you or any of your relatives ever had problems with anesthesia? No   14. Do you have sleep apnea, excessive snoring or daytime drowsiness? No   15. Do you have any artifical heart valves or other implanted medical devices like a pacemaker, defibrillator, or continuous glucose monitor? No   16. Do you have artificial joints? No   17. Are you allergic to latex? YES:    18. Is there any chance that you may be pregnant? No        Status of Chronic Conditions:  See problem list for active medical problems.  Problems all longstanding and stable, except as noted/documented.  See ROS for pertinent symptoms related to these conditions.      Review of Systems  Constitutional, neuro, ENT, endocrine, pulmonary, cardiac, gastrointestinal, genitourinary, musculoskeletal, integument and psychiatric systems are negative, except as otherwise noted.    Patient Active Problem List    Diagnosis Date Noted     Indication for care in labor or delivery 01/17/2014     Priority: Medium      Past Medical History:   Diagnosis Date     KELLEY (generalized anxiety disorder)     Celexa 20 mg daily     IBS (irritable bowel syndrome)      Past Surgical History:   Procedure Laterality Date     HC TOOTH EXTRACTION W/FORCEP  2002     WISDOM TOOTH EXTRACTION       Current Outpatient Medications   Medication Sig Dispense Refill     citalopram (CELEXA) 20 MG tablet Take 1 tablet (20 mg) by mouth daily 90 tablet 2     omeprazole (PRILOSEC) 40 MG DR capsule Take 1 capsule (40 mg) by mouth daily 90 capsule 3     Prenatal Vit-Fe Fumarate-FA (PRENATAL MULTIVITAMIN  PLUS IRON) 27-0.8 MG TABS Take 1 tablet by mouth daily (Patient not taking: Reported on 9/17/2021)         Allergies   Allergen Reactions     Ibuprofen GI Disturbance        Social History     Tobacco Use     Smoking status: Never Smoker     Smokeless tobacco: Never Used   Substance Use Topics     Alcohol use: No     Family History   Problem Relation Age of Onset     No Known Problems Mother      Esophageal Cancer Father      Lung Cancer Father      Cancer Father      Ovarian Cancer Maternal Grandmother 30.00     Cervical Cancer Maternal Grandmother      Rheumatic fever Maternal Grandmother      Heart Disease Maternal Grandmother      Brain Cancer Maternal Grandfather      Breast Cancer Other      Asthma Son      Anxiety Disorder Son      History   Drug Use No         Objective     There were no vitals taken for  this visit.    Physical Exam    GENERAL APPEARANCE: healthy, alert and no distress     EYES: EOMI, PERRL     HENT: ear canals and TM's normal and nose and mouth without ulcers or lesions     NECK: no adenopathy, no asymmetry, masses, or scars and thyroid normal to palpation     RESP: lungs clear to auscultation - no rales, rhonchi or wheezes     CV: regular rates and rhythm, normal S1 S2, no S3 or S4 and no murmur, click or rub     MS: extremities normal- no gross deformities noted, no evidence of inflammation in joints, FROM in all extremities.     SKIN: no suspicious lesions or rashes     NEURO: Normal strength and tone, sensory exam grossly normal, mentation intact and speech normal     PSYCH: mentation appears normal. and affect normal/bright     LYMPHATICS: No cervical adenopathy    Recent Labs   Lab Test 03/24/21  1120   HGB 13.9         POTASSIUM 4.2   CR 0.75        Diagnostics:  No labs were ordered during this visit.   No EKG required for low risk surgery (cataract, skin procedure, breast biopsy, etc).    Revised Cardiac Risk Index (RCRI):  The patient has the following serious cardiovascular risks for perioperative complications:   - No serious cardiac risks = 0 points     RCRI Interpretation: 0 points: Class I (very low risk - 0.4% complication rate)           Signed Electronically by: Malcolm Lo MD  Copy of this evaluation report is provided to requesting physician.

## 2021-09-17 NOTE — TELEPHONE ENCOUNTER
Screening Questions  1. Are you active on mychart? YES     2. What insurance is in the chart? [employer]    2.  Ordering/Referring Provider: DR. AVILA    3. BMI 29.8    4. Are you on daily home oxygen? NO    5. Do you have a history of difficult airway? NO    6. Have you had a heart, lung, or liver transplant? NO    7. Are you currently on dialysis or have chronic kidney disease? NO    8. Have you had a stroke or Transient ischemic atttack (TIA) within 6 months? NO    9. In the past 6 months, have you had any heart related issues including cardiomyopathy or heart attack?         If yes, did it require cardiac stenting or other implantable device?NO    10. Do you have any implantable devices in your body (pacemaker, defib, LVAD)? NO    11. Do you take nitroglycerin? If yes, how often? NO    12. Are you currently taking any blood thinners?NO    13. Are you a diabetic? NO    14. (Females) Are you currently pregnant? NO  If yes, how many weeks?    15. Have you had a procedure in the past that was difficult to tolerate with conscious sedation? Any allergies to Fentanyl or Versed NO    16. Are you taking any scheduled prescription narcotics more than once daily? NO    17. Do you have any chemical dependencies such as alcohol, street drugs, or methadone? NO    18. Do you have any history of post-traumatic stress syndrome or mental health issues? NO    19. Do you transfer independently? YES     20.  Do you have any issues with constipation? NO    21. Preferred Pharmacy for Pre Prescription [facility]     Scheduling Details    Which Colonoscopy Prep was Sent?:   Procedure Scheduled: EGD  Provider/Surgeon: Dr. Barber  Date of Procedure: 09/20/2021  Location: [facility]  Caller (Please ask for phone number if not scheduled by patient): [facility]      Sedation Type: MAC  Conscious Sedation- Needs  for 6 hours after the procedure  MAC/General-Needs  for 24 hours after procedure    Pre-op Required at [facility] and OR for MAC sedation:   (if yes advise patient they will need a pre-op prior to procedure)      Is patient on blood thinners? -NO (If yes- inform patient to follow up with PCP or provider for follow up instructions)     Informed patient they will need an adult  YES  Cannot take any type of public or medical transportation alone    [facility] or [facility]: YES    Confirmed Nurse will call to complete assessment YES    Additional comments: NO

## 2021-09-18 LAB — SARS-COV-2 RNA RESP QL NAA+PROBE: NEGATIVE

## 2021-09-20 ENCOUNTER — ANESTHESIA EVENT (OUTPATIENT)
Dept: SURGERY | Facility: AMBULATORY SURGERY CENTER | Age: 36
End: 2021-09-20
Payer: COMMERCIAL

## 2021-09-20 ENCOUNTER — HOSPITAL ENCOUNTER (OUTPATIENT)
Facility: AMBULATORY SURGERY CENTER | Age: 36
End: 2021-09-20
Attending: INTERNAL MEDICINE
Payer: COMMERCIAL

## 2021-09-20 ENCOUNTER — ANESTHESIA (OUTPATIENT)
Dept: SURGERY | Facility: AMBULATORY SURGERY CENTER | Age: 36
End: 2021-09-20
Payer: COMMERCIAL

## 2021-09-20 VITALS
OXYGEN SATURATION: 98 % | HEART RATE: 62 BPM | SYSTOLIC BLOOD PRESSURE: 109 MMHG | TEMPERATURE: 97.3 F | RESPIRATION RATE: 16 BRPM | DIASTOLIC BLOOD PRESSURE: 58 MMHG

## 2021-09-20 VITALS — HEART RATE: 59 BPM

## 2021-09-20 DIAGNOSIS — K21.00 GASTROESOPHAGEAL REFLUX DISEASE WITH ESOPHAGITIS WITHOUT HEMORRHAGE: ICD-10-CM

## 2021-09-20 LAB
HCG UR QL: NEGATIVE
INTERNAL QC OK POCT: NORMAL
UPPER GI ENDOSCOPY: NORMAL

## 2021-09-20 PROCEDURE — 81025 URINE PREGNANCY TEST: CPT | Performed by: PATHOLOGY

## 2021-09-20 PROCEDURE — 43235 EGD DIAGNOSTIC BRUSH WASH: CPT

## 2021-09-20 RX ORDER — NALOXONE HYDROCHLORIDE 0.4 MG/ML
0.2 INJECTION, SOLUTION INTRAMUSCULAR; INTRAVENOUS; SUBCUTANEOUS
Status: DISCONTINUED | OUTPATIENT
Start: 2021-09-20 | End: 2021-09-21 | Stop reason: HOSPADM

## 2021-09-20 RX ORDER — PROPOFOL 10 MG/ML
INJECTION, EMULSION INTRAVENOUS PRN
Status: DISCONTINUED | OUTPATIENT
Start: 2021-09-20 | End: 2021-09-20

## 2021-09-20 RX ORDER — PROCHLORPERAZINE MALEATE 10 MG
10 TABLET ORAL EVERY 6 HOURS PRN
Status: DISCONTINUED | OUTPATIENT
Start: 2021-09-20 | End: 2021-09-21 | Stop reason: HOSPADM

## 2021-09-20 RX ORDER — GLYCOPYRROLATE 0.2 MG/ML
INJECTION, SOLUTION INTRAMUSCULAR; INTRAVENOUS PRN
Status: DISCONTINUED | OUTPATIENT
Start: 2021-09-20 | End: 2021-09-20

## 2021-09-20 RX ORDER — FLUMAZENIL 0.1 MG/ML
0.2 INJECTION, SOLUTION INTRAVENOUS
Status: DISCONTINUED | OUTPATIENT
Start: 2021-09-20 | End: 2021-09-21 | Stop reason: HOSPADM

## 2021-09-20 RX ORDER — SIMETHICONE
LIQUID (ML) MISCELLANEOUS PRN
Status: DISCONTINUED | OUTPATIENT
Start: 2021-09-20 | End: 2021-09-20 | Stop reason: HOSPADM

## 2021-09-20 RX ORDER — NALOXONE HYDROCHLORIDE 0.4 MG/ML
0.4 INJECTION, SOLUTION INTRAMUSCULAR; INTRAVENOUS; SUBCUTANEOUS
Status: DISCONTINUED | OUTPATIENT
Start: 2021-09-20 | End: 2021-09-21 | Stop reason: HOSPADM

## 2021-09-20 RX ORDER — LIDOCAINE 40 MG/G
CREAM TOPICAL
Status: DISCONTINUED | OUTPATIENT
Start: 2021-09-20 | End: 2021-09-20 | Stop reason: HOSPADM

## 2021-09-20 RX ORDER — LIDOCAINE HYDROCHLORIDE 20 MG/ML
INJECTION, SOLUTION INFILTRATION; PERINEURAL PRN
Status: DISCONTINUED | OUTPATIENT
Start: 2021-09-20 | End: 2021-09-20

## 2021-09-20 RX ORDER — ONDANSETRON 2 MG/ML
4 INJECTION INTRAMUSCULAR; INTRAVENOUS EVERY 6 HOURS PRN
Status: DISCONTINUED | OUTPATIENT
Start: 2021-09-20 | End: 2021-09-21 | Stop reason: HOSPADM

## 2021-09-20 RX ORDER — SODIUM CHLORIDE, SODIUM LACTATE, POTASSIUM CHLORIDE, CALCIUM CHLORIDE 600; 310; 30; 20 MG/100ML; MG/100ML; MG/100ML; MG/100ML
500 INJECTION, SOLUTION INTRAVENOUS CONTINUOUS
Status: DISCONTINUED | OUTPATIENT
Start: 2021-09-20 | End: 2021-09-20 | Stop reason: HOSPADM

## 2021-09-20 RX ORDER — ONDANSETRON 2 MG/ML
4 INJECTION INTRAMUSCULAR; INTRAVENOUS
Status: DISCONTINUED | OUTPATIENT
Start: 2021-09-20 | End: 2021-09-20 | Stop reason: HOSPADM

## 2021-09-20 RX ORDER — PROPOFOL 10 MG/ML
INJECTION, EMULSION INTRAVENOUS CONTINUOUS PRN
Status: DISCONTINUED | OUTPATIENT
Start: 2021-09-20 | End: 2021-09-20

## 2021-09-20 RX ORDER — ONDANSETRON 4 MG/1
4 TABLET, ORALLY DISINTEGRATING ORAL EVERY 6 HOURS PRN
Status: DISCONTINUED | OUTPATIENT
Start: 2021-09-20 | End: 2021-09-21 | Stop reason: HOSPADM

## 2021-09-20 RX ADMIN — PROPOFOL 50 MG: 10 INJECTION, EMULSION INTRAVENOUS at 12:10

## 2021-09-20 RX ADMIN — SODIUM CHLORIDE, SODIUM LACTATE, POTASSIUM CHLORIDE, CALCIUM CHLORIDE: 600; 310; 30; 20 INJECTION, SOLUTION INTRAVENOUS at 11:49

## 2021-09-20 RX ADMIN — PROPOFOL 150 MCG/KG/MIN: 10 INJECTION, EMULSION INTRAVENOUS at 12:11

## 2021-09-20 RX ADMIN — LIDOCAINE HYDROCHLORIDE 40 MG: 20 INJECTION, SOLUTION INFILTRATION; PERINEURAL at 12:10

## 2021-09-20 RX ADMIN — GLYCOPYRROLATE 0.2 MG: 0.2 INJECTION, SOLUTION INTRAMUSCULAR; INTRAVENOUS at 12:11

## 2021-09-20 RX ADMIN — SODIUM CHLORIDE, SODIUM LACTATE, POTASSIUM CHLORIDE, CALCIUM CHLORIDE 500 ML: 600; 310; 30; 20 INJECTION, SOLUTION INTRAVENOUS at 11:42

## 2021-09-20 RX ADMIN — PROPOFOL 30 MG: 10 INJECTION, EMULSION INTRAVENOUS at 12:13

## 2021-09-20 NOTE — ANESTHESIA POSTPROCEDURE EVALUATION
Patient: Komal Parra    Procedure(s):  ESOPHAGOGASTRODUODENOSCOPY (EGD)    Diagnosis:Gastroesophageal reflux disease with esophagitis without hemorrhage [K21.00]  Diagnosis Additional Information: No value filed.    Anesthesia Type:  MAC    Note:  Disposition: Outpatient   Postop Pain Control: Uneventful            Sign Out: Well controlled pain   PONV: No   Neuro/Psych: Uneventful            Sign Out: Acceptable/Baseline neuro status   Airway/Respiratory: Uneventful            Sign Out: Acceptable/Baseline resp. status   CV/Hemodynamics: Uneventful            Sign Out: Acceptable CV status; No obvious hypovolemia; No obvious fluid overload   Other NRE: NONE   DID A NON-ROUTINE EVENT OCCUR? No           Last vitals:  Vitals Value Taken Time   /58 09/20/21 1243   Temp 36.3  C (97.3  F) 09/20/21 1243   Pulse     Resp 16 09/20/21 1243   SpO2 98 % 09/20/21 1243       Electronically Signed By: Dionicio Jesus MD  September 20, 2021  2:18 PM

## 2021-09-20 NOTE — ANESTHESIA PREPROCEDURE EVALUATION
Anesthesia Pre-Procedure Evaluation    Patient: Komal Parra   MRN: [id number] : 1985        Preoperative Diagnosis: Gastroesophageal reflux disease with esophagitis without hemorrhage [K21.00]   Procedure : Procedure(s):  ESOPHAGOGASTRODUODENOSCOPY (EGD)     Past Medical History:   Diagnosis Date     KELLEY (generalized anxiety disorder)     Celexa 20 mg daily     IBS (irritable bowel syndrome)       Past Surgical History:   Procedure Laterality Date     HC TOOTH EXTRACTION W/FORCEP       WISDOM TOOTH EXTRACTION        Allergies   Allergen Reactions     Ibuprofen GI Disturbance      Social History     Tobacco Use     Smoking status: Never Smoker     Smokeless tobacco: Never Used   Substance Use Topics     Alcohol use: No      Wt Readings from Last 1 Encounters:   21 91.9 kg (202 lb 9.6 oz)        Anesthesia Evaluation            ROS/MED HX  ENT/Pulmonary:       Neurologic:       Cardiovascular:       METS/Exercise Tolerance:     Hematologic:       Musculoskeletal:       GI/Hepatic:       Renal/Genitourinary:       Endo:       Psychiatric/Substance Use:     (+) psychiatric history anxiety     Infectious Disease:       Malignancy:       Other:            Physical Exam    Airway        Mallampati: II       Respiratory Devices and Support         Dental           Cardiovascular          Rhythm and rate: regular and normal     Pulmonary           breath sounds clear to auscultation           OUTSIDE LABS:  CBC:   Lab Results   Component Value Date    WBC 5.2 2021    HGB 13.9 2021    HGB 12.3 02/15/2019    HCT 42.1 2021     2021     BMP:   Lab Results   Component Value Date     2021    POTASSIUM 4.2 2021    CHLORIDE 108 (H) 2021    CO2 24 2021    BUN 11 2021    CR 0.75 2021    GLC 73 2021     COAGS: No results found for: PTT, INR, FIBR  POC:   Lab Results   Component Value Date    HCG Negative 2021     HEPATIC:   Lab  Results   Component Value Date    ALBUMIN 3.8 03/24/2021    PROTTOTAL 6.6 03/24/2021    ALT 11 03/24/2021    AST 19 03/24/2021    ALKPHOS 55 03/24/2021    BILITOTAL 0.5 03/24/2021     OTHER:   Lab Results   Component Value Date    A1C 5.2 12/03/2015    KERRI 8.7 03/24/2021    TSH 1.99 02/15/2019       Anesthesia Plan    ASA Status:  2   NPO Status:  NPO Appropriate    Anesthesia Type: MAC.              Consents            Postoperative Care            Comments:                Dionicio Jesus MD

## 2021-09-20 NOTE — ANESTHESIA CARE TRANSFER NOTE
Patient: Komal Parra    Procedure(s):  ESOPHAGOGASTRODUODENOSCOPY (EGD)    Diagnosis: Gastroesophageal reflux disease with esophagitis without hemorrhage [K21.00]  Diagnosis Additional Information: No value filed.    Anesthesia Type:   MAC     Note:    Oropharynx: oropharynx clear of all foreign objects  Level of Consciousness: awake  Oxygen Supplementation: room air    Independent Airway: airway patency satisfactory and stable  Dentition: dentition unchanged  Vital Signs Stable: post-procedure vital signs reviewed and stable  Report to RN Given: handoff report given  Patient transferred to: Phase II    Handoff Report: Identifed the Patient, Identified the Reponsible Provider, Reviewed the pertinent medical history, Discussed the surgical course, Reviewed Intra-OP anesthesia mangement and issues during anesthesia, Set expectations for post-procedure period and Allowed opportunity for questions and acknowledgement of understanding      Vitals:  Vitals Value Taken Time   /58 09/20/21 1222   Temp 36.3  C (97.3  F) 09/20/21 1222   Pulse     Resp 14 09/20/21 1222   SpO2 95 % 09/20/21 1222       Electronically Signed By: ERICK Limon CRNA  September 20, 2021  12:24 PM

## 2021-09-20 NOTE — H&P
Komal Parra  [id number]  female  36 year old      Reason for procedure/surgery: GERD - follow up esophagitis for helaing    Patient Active Problem List   Diagnosis     Indication for care in labor or delivery       Past Surgical History:    Past Surgical History:   Procedure Laterality Date     HC TOOTH EXTRACTION W/FORCEP  2002     WISDOM TOOTH EXTRACTION         Past Medical History:   Past Medical History:   Diagnosis Date     KELLEY (generalized anxiety disorder)     Celexa 20 mg daily     IBS (irritable bowel syndrome)        Social History:   Social History     Tobacco Use     Smoking status: Never Smoker     Smokeless tobacco: Never Used   Substance Use Topics     Alcohol use: No       Family History:   Family History   Problem Relation Age of Onset     No Known Problems Mother      Esophageal Cancer Father      Lung Cancer Father      Cancer Father      Ovarian Cancer Maternal Grandmother 30.00     Cervical Cancer Maternal Grandmother      Rheumatic fever Maternal Grandmother      Heart Disease Maternal Grandmother      Brain Cancer Maternal Grandfather      Breast Cancer Other      Asthma Son      Anxiety Disorder Son        Allergies:   Allergies   Allergen Reactions     Ibuprofen GI Disturbance       Active Medications:   Current Outpatient Medications   Medication Sig Dispense Refill     citalopram (CELEXA) 20 MG tablet Take 1 tablet (20 mg) by mouth daily 90 tablet 2     omeprazole (PRILOSEC) 40 MG DR capsule Take 1 capsule (40 mg) by mouth daily 90 capsule 3       Systemic Review:   CONSTITUTIONAL: NEGATIVE for fever, chills, change in weight  ENT/MOUTH: NEGATIVE for ear, mouth and throat problems  RESP: NEGATIVE for significant cough or SOB  CV: NEGATIVE for chest pain, palpitations or peripheral edema    Physical Examination:   Vital Signs: /76   Pulse 62   Temp 97.4  F (36.3  C) (Temporal)   Resp 16   LMP 09/20/2021   SpO2 98%   GENERAL: healthy, alert and no distress  NECK: no  adenopathy, no asymmetry, masses, or scars  RESP: lungs clear to auscultation - no rales, rhonchi or wheezes  CV: regular rate and rhythm, normal S1 S2, no S3 or S4, no murmur, click or rub, no peripheral edema and peripheral pulses strong  ABDOMEN: soft, nontender, no hepatosplenomegaly, no masses and bowel sounds normal  MS: no gross musculoskeletal defects noted, no edema    Plan: Appropriate to proceed as scheduled.      Rashad Barber MD  9/20/2021    PCP:  Dorota Guzman

## 2021-10-03 ENCOUNTER — HEALTH MAINTENANCE LETTER (OUTPATIENT)
Age: 36
End: 2021-10-03

## 2021-11-09 VITALS — BODY MASS INDEX: 29.98 KG/M2 | WEIGHT: 190 LBS

## 2021-11-09 NOTE — NURSING NOTE
Chief Complaint   Patient presents with     Follow Up       Vitals:    11/09/21 1600   Weight: 86.2 kg (190 lb)       Body mass index is 29.98 kg/m .    Concepción Merchant CMA

## 2021-11-09 NOTE — PROGRESS NOTES
"Komal Parra is a 36 year old female who is being evaluated via a billable video visit.      Please send link to cell phone:  [phone]      The patient has been notified of following:     \"This video visit will be conducted via a call between you and your physician/provider. We have found that certain health care needs can be provided without the need for an in-person physical exam.  This service lets us provide the care you need with a video conversation.  If a prescription is necessary we can send it directly to your pharmacy.  If lab work is needed we can place an order for that and you can then stop by our lab to have the test done at a later time.    If during the course of the call the physician/provider feels a video visit is not appropriate, you will not be charged for this service.\"     Patient confirmed that they are in [place] for today's visit yes.    Video-Visit Details  Type of service:  Video Visit    Video Start Time: 7:10 AM  Video End Time:  7:39 AM    Originating Location (pt. Location): Home    Distant Location (provider location):  [facility] [place]     Platform used: [facility]    GI CLINIC VISIT    CC/REFERRING MD:  Referred Self  REASON FOR CONSULTATION: chronic cough    ASSESSMENT/PLAN:  1. GERD, Chronic cough   Patient reports 2 years of chronic cough certain foods including lettuce, certain types of wheat.  Has been seen by ENT and allergy without a source of symptoms.  Does have significant family history of GERD and father and uncles with esophageal cancer.  Bravo off PPI showed positive study with possible correlation with throat closing and acid reflux events. No correlation with heartburn and cough, study was truncated as the probe was dislodged. DeMeester average was 28.3. Though no association was found between GERD and cough, her study demonstrates underlying reflux.     She overall does feel better with omeprazole 40 mg a day, and at 20 " "mg a day dosing she had daily symptoms and one episode of severe symptoms. Would recommend increasing back to 40 mg a day. Discussed risks and benefits of PPI, will plan to check nutritional markers while on PPI (in one year).   Can continue modified low FOMDAP diet which has been helping her underlying IBS symptoms and GERD- can discuss further with GI dietitian.   --Omeprazole 40 once daily    GERD Lifestyle Modifications  -- Avoid foods high in fat or high fructose corn syrup  -- do not eat within three hours of laying down or going to bed  -- raise the head of your bed 6-8 inches  -- avoid alcohol  -- aim for BMI of less than <25 and lose extra weight as needed    -- meet with GI dietitian     RTC 4 months     Thank you for this consultation.  It was a pleasure to participate in the care of this patient; please contact us with any further questions.     36 Minutes was spent on the date of the encounter during chart review, history and exam, documentation, and further activities as noted       Stephanie Hernandez PA-C  Division of Gastroenterology, Hepatology & Nutrition  [facility]        HPI  Komal Parra is a 35 year old presenting for chronic cough. Patient reports a chronic cough for 2 years. Symptoms do seem to be getting worse with time.     She has previously been seen by an allergist ([facility] in [place]- gluten, lettuce, preservative allergies were negative) and an ENT, a dietitian also suggested yeast overgrowth. Also notes lettuce seems to be a trigger. She notes that symptoms went away for a while (early April for 2 weeks), but that they came back \"with a vengence\". Symptoms seem to be year round. She also describes sensation of \"lump in her throat\" when she swallows that feels \"mucusy\". She states this is dry cough- and can last for hours after eating. When she coughs, she does not necessarily feal better but she always has this urge to cough. She has been on PPI with " pantoprazole which did not help after two months, Flonase did not help. She does get some GERD symptoms related to food triggers. She occasionally will have dysphagia to solid foods if she does not chew properly. No dysphagia to liquids.  Foods that are high in wheat seem to trigger symptoms. She reports that she can typically tolerate white bread, but what bread can make things worse.     She notes that her dad had really severe GERD and  of esophageal cancer, her sister had severe GERD as well but this improved.     She does note SOB- and feels like she cannot breathe. She will try taking an inhaler- does not make much of a difference. She is able to run without coughing, but with other types of workouts can trigger symptoms.     Notes IBS- cannot eat fried foods, has had this for 20 years. In the beginning of April she was constipated, and she was not coughing during this time. Can be up to four times a day. Will have urgency with bowel movements.     Interval History 2021:   Was switched from pantoprazole to omeprazole. She took 40 mg BID for one month, then reduced to once daily for a month. Overall is doing well with this.     Has been doing the low FODMAP diet which she has found helpful. Things that are high in sugar do seem to agitate her cough, and irritates her IBS. She is taking omeprazole at bedtime    Interval History 11/10/2021:  After the patient's last visit, she had EGD which showed healing of her grade B esophagitis.     She is now taking omeprazole 20 mg daily- she feels GERD once a day. She does report an episode of chest pain that did not feel like traditional GERD (cramping feeling that came and went) and she considered going to the hospital. She tried some tums when this happened. She did not eat anything out of the ordinary.     She continues to follow the partial low FODMAP diet, no red meat or fried food.         PROBLEM LIST  Patient Active Problem List    Diagnosis Date Noted      Indication for care in labor or delivery 01/17/2014     Priority: Medium       PERTINENT PAST MEDICAL HISTORY:  Anxiety     PREVIOUS SURGERIES:  Past Surgical History:   Procedure Laterality Date     ESOPHAGOSCOPY, GASTROSCOPY, DUODENOSCOPY (EGD), COMBINED N/A 9/20/2021    Procedure: ESOPHAGOGASTRODUODENOSCOPY (EGD);  Surgeon: Rashad Barber MD;  Location: [facility] OR      TOOTH EXTRACTION W/FORCEP  2002     WISDOM TOOTH EXTRACTION         PREVIOUS ENDOSCOPY:  Impression:          - LA Grade B reflux esophagitis.                        - Normal stomach.                        - Normal examined duodenum.                        - The BRAVO pH capsule was deployed.                        - No specimens collected.      9/20/2021:  Impression:          - Z-line irregular, 37 cm from the incisors. No evidence                        of Mccrary's esophagus.                        - Gastroesophageal flap valve classified as Hill Grade                        II (fold present, opens with respiration).                        - Normal stomach.                        - Normal examined duodenum.                        - No specimens collected.   ALLERGIES:     Allergies   Allergen Reactions     Ibuprofen GI Disturbance       PERTINENT MEDICATIONS:    Current Outpatient Medications:      citalopram (CELEXA) 20 MG tablet, Take 1 tablet (20 mg) by mouth daily, Disp: 90 tablet, Rfl: 2     omeprazole (PRILOSEC) 20 MG DR capsule, Take 1 capsule (20 mg) by mouth daily, Disp: 30 capsule, Rfl: 1   Avoids NSAIDs     SOCIAL HISTORY:  No alcohol use or drug use   Social History     Socioeconomic History     Marital status:      Spouse name: Not on file     Number of children: Not on file     Years of education: Not on file     Highest education level: Not on file   Occupational History     Not on file   Tobacco Use     Smoking status: Never Smoker     Smokeless tobacco: Never Used   Substance and Sexual Activity      Alcohol use: No     Drug use: No     Sexual activity: Yes     Partners: Male     Birth control/protection: None   Other Topics Concern     Parent/sibling w/ CABG, MI or angioplasty before 65F 55M? Not Asked   Social History Narrative     Not on file     Social Determinants of Health     Financial Resource Strain: Not on file   Food Insecurity: Not on file   Transportation Needs: Not on file   Physical Activity: Not on file   Stress: Not on file   Social Connections: Not on file   Intimate Partner Violence: Not on file   Housing Stability: Not on file       FAMILY HISTORY:  FH of CRC: none   Mother with pre-cancerous polyps   FH of IBD: none   Dad with bad reflux and esophageal cancer   Father's biological family with esophageal cancer and GERD.   He also has a sibling with Mccrary's esophagus     Past/family/social history reviewed and no changes    PHYSICAL EXAMINATION:  General appearance: Healthy appearing adult, in no acute distress  Eyes: Sclera anicteric  Ears, nose, mouth and throat: No obvious external lesions of ears and nose, Hearing intact  Neck: Symmetric, No obvious external lesions  Respiratory: Normal respiration, no use of accessory muscles   Skin: No rashes or jaundice   Psychiatric: Oriented to person, place and time, Appropriate mood and affect.     PERTINENT STUDIES:  No results found for any previous visit.     BRAVO study  -- positive study with possible correlation with throat closing and acid reflux events. No correlation with heartburn and cough, study was truncated as the probe was dislodged. DeMeester average was 28.3

## 2021-11-10 ENCOUNTER — VIRTUAL VISIT (OUTPATIENT)
Dept: GASTROENTEROLOGY | Facility: CLINIC | Age: 36
End: 2021-11-10
Payer: COMMERCIAL

## 2021-11-10 DIAGNOSIS — K21.9 GASTROESOPHAGEAL REFLUX DISEASE WITHOUT ESOPHAGITIS: Primary | ICD-10-CM

## 2021-11-10 PROCEDURE — 99214 OFFICE O/P EST MOD 30 MIN: CPT | Mod: GT | Performed by: PHYSICIAN ASSISTANT

## 2021-11-10 RX ORDER — OMEPRAZOLE 40 MG/1
40 CAPSULE, DELAYED RELEASE ORAL DAILY
Qty: 90 CAPSULE | Refills: 3 | Status: SHIPPED | OUTPATIENT
Start: 2021-11-10 | End: 2022-03-15

## 2021-11-10 NOTE — LETTER
"    11/10/2021         RE: Komal Parra  [address] [address]        Dear Colleague,    Thank you for referring your patient, Komal Parra, to the [facility] [place]. Please see a copy of my visit note below.    Komal Parra is a 36 year old female who is being evaluated via a billable video visit.      Please send link to cell phone:  [phone]      The patient has been notified of following:     \"This video visit will be conducted via a call between you and your physician/provider. We have found that certain health care needs can be provided without the need for an in-person physical exam.  This service lets us provide the care you need with a video conversation.  If a prescription is necessary we can send it directly to your pharmacy.  If lab work is needed we can place an order for that and you can then stop by our lab to have the test done at a later time.    If during the course of the call the physician/provider feels a video visit is not appropriate, you will not be charged for this service.\"     Patient confirmed that they are in [place] for today's visit yes.    Video-Visit Details  Type of service:  Video Visit    Video Start Time: 7:10 AM  Video End Time:  7:39 AM    Originating Location (pt. Location): Home    Distant Location (provider location):  [facility] [place]     Platform used: [employer]    GI CLINIC VISIT    CC/REFERRING MD:  Referred Self  REASON FOR CONSULTATION: chronic cough    ASSESSMENT/PLAN:  1. GERD, Chronic cough   Patient reports 2 years of chronic cough certain foods including lettuce, certain types of wheat.  Has been seen by ENT and allergy without a source of symptoms.  Does have significant family history of GERD and father and uncles with esophageal cancer.  Bravo off PPI showed positive study with possible correlation with throat closing and acid reflux events. No " "correlation with heartburn and cough, study was truncated as the probe was dislodged. DeMeester average was 28.3. Though no association was found between GERD and cough, her study demonstrates underlying reflux.     She overall does feel better with omeprazole 40 mg a day, and at 20 mg a day dosing she had daily symptoms and one episode of severe symptoms. Would recommend increasing back to 40 mg a day. Discussed risks and benefits of PPI, will plan to check nutritional markers while on PPI (in one year).   Can continue modified low FOMDAP diet which has been helping her underlying IBS symptoms and GERD- can discuss further with GI dietitian.   --Omeprazole 40 once daily    GERD Lifestyle Modifications  -- Avoid foods high in fat or high fructose corn syrup  -- do not eat within three hours of laying down or going to bed  -- raise the head of your bed 6-8 inches  -- avoid alcohol  -- aim for BMI of less than <25 and lose extra weight as needed    -- meet with GI dietitian     RTC 4 months     Thank you for this consultation.  It was a pleasure to participate in the care of this patient; please contact us with any further questions.     36 Minutes was spent on the date of the encounter during chart review, history and exam, documentation, and further activities as noted       Stephanie Hernandez PA-C  Division of Gastroenterology, Hepatology & Nutrition  [facility]        HPI  Komal Parra is a 35 year old presenting for chronic cough. Patient reports a chronic cough for 2 years. Symptoms do seem to be getting worse with time.     She has previously been seen by an allergist ([facility] in [place]- gluten, lettuce, preservative allergies were negative) and an ENT, a dietitian also suggested yeast overgrowth. Also notes lettuce seems to be a trigger. She notes that symptoms went away for a while (early April for 2 weeks), but that they came back \"with a vengence\". Symptoms seem to be year " "round. She also describes sensation of \"lump in her throat\" when she swallows that feels \"mucusy\". She states this is dry cough- and can last for hours after eating. When she coughs, she does not necessarily feal better but she always has this urge to cough. She has been on PPI with pantoprazole which did not help after two months, Flonase did not help. She does get some GERD symptoms related to food triggers. She occasionally will have dysphagia to solid foods if she does not chew properly. No dysphagia to liquids.  Foods that are high in wheat seem to trigger symptoms. She reports that she can typically tolerate white bread, but what bread can make things worse.     She notes that her dad had really severe GERD and  of esophageal cancer, her sister had severe GERD as well but this improved.     She does note SOB- and feels like she cannot breathe. She will try taking an inhaler- does not make much of a difference. She is able to run without coughing, but with other types of workouts can trigger symptoms.     Notes IBS- cannot eat fried foods, has had this for 20 years. In the beginning of April she was constipated, and she was not coughing during this time. Can be up to four times a day. Will have urgency with bowel movements.     Interval History 2021:   Was switched from pantoprazole to omeprazole. She took 40 mg BID for one month, then reduced to once daily for a month. Overall is doing well with this.     Has been doing the low FODMAP diet which she has found helpful. Things that are high in sugar do seem to agitate her cough, and irritates her IBS. She is taking omeprazole at bedtime    Interval History 11/10/2021:  After the patient's last visit, she had EGD which showed healing of her grade B esophagitis.     She is now taking omeprazole 20 mg daily- she feels GERD once a day. She does report an episode of chest pain that did not feel like traditional GERD (cramping feeling that came and went) and " she considered going to the hospital. She tried some tums when this happened. She did not eat anything out of the ordinary.     She continues to follow the partial low FODMAP diet, no red meat or fried food.         PROBLEM LIST  Patient Active Problem List    Diagnosis Date Noted     Indication for care in labor or delivery 01/17/2014     Priority: Medium       PERTINENT PAST MEDICAL HISTORY:  Anxiety     PREVIOUS SURGERIES:  Past Surgical History:   Procedure Laterality Date     ESOPHAGOSCOPY, GASTROSCOPY, DUODENOSCOPY (EGD), COMBINED N/A 9/20/2021    Procedure: ESOPHAGOGASTRODUODENOSCOPY (EGD);  Surgeon: Rashad Barber MD;  Location: [facility] OR      TOOTH EXTRACTION W/FORCEP  2002     WISDOM TOOTH EXTRACTION         PREVIOUS ENDOSCOPY:  Impression:          - LA Grade B reflux esophagitis.                        - Normal stomach.                        - Normal examined duodenum.                        - The BRAVO pH capsule was deployed.                        - No specimens collected.      9/20/2021:  Impression:          - Z-line irregular, 37 cm from the incisors. No evidence                        of Mccrary's esophagus.                        - Gastroesophageal flap valve classified as Hill Grade                        II (fold present, opens with respiration).                        - Normal stomach.                        - Normal examined duodenum.                        - No specimens collected.   ALLERGIES:     Allergies   Allergen Reactions     Ibuprofen GI Disturbance       PERTINENT MEDICATIONS:    Current Outpatient Medications:      citalopram (CELEXA) 20 MG tablet, Take 1 tablet (20 mg) by mouth daily, Disp: 90 tablet, Rfl: 2     omeprazole (PRILOSEC) 20 MG DR capsule, Take 1 capsule (20 mg) by mouth daily, Disp: 30 capsule, Rfl: 1   Avoids NSAIDs     SOCIAL HISTORY:  No alcohol use or drug use   Social History     Socioeconomic History     Marital status:      Spouse name: Not  on file     Number of children: Not on file     Years of education: Not on file     Highest education level: Not on file   Occupational History     Not on file   Tobacco Use     Smoking status: Never Smoker     Smokeless tobacco: Never Used   Substance and Sexual Activity     Alcohol use: No     Drug use: No     Sexual activity: Yes     Partners: Male     Birth control/protection: None   Other Topics Concern     Parent/sibling w/ CABG, MI or angioplasty before 65F 55M? Not Asked   Social History Narrative     Not on file     Social Determinants of Health     Financial Resource Strain: Not on file   Food Insecurity: Not on file   Transportation Needs: Not on file   Physical Activity: Not on file   Stress: Not on file   Social Connections: Not on file   Intimate Partner Violence: Not on file   Housing Stability: Not on file       FAMILY HISTORY:  FH of CRC: none   Mother with pre-cancerous polyps   FH of IBD: none   Dad with bad reflux and esophageal cancer   Father's biological family with esophageal cancer and GERD.   He also has a sibling with Mccrary's esophagus     Past/family/social history reviewed and no changes    PHYSICAL EXAMINATION:  General appearance: Healthy appearing adult, in no acute distress  Eyes: Sclera anicteric  Ears, nose, mouth and throat: No obvious external lesions of ears and nose, Hearing intact  Neck: Symmetric, No obvious external lesions  Respiratory: Normal respiration, no use of accessory muscles   Skin: No rashes or jaundice   Psychiatric: Oriented to person, place and time, Appropriate mood and affect.     PERTINENT STUDIES:  No results found for any previous visit.     BRAVO study  -- positive study with possible correlation with throat closing and acid reflux events. No correlation with heartburn and cough, study was truncated as the probe was dislodged. DeMeester average was 28.3       Again, thank you for allowing me to participate in the care of your patient.         Sincerely,        Stephanie Hernandez PA-C

## 2021-11-10 NOTE — PATIENT INSTRUCTIONS
It was a pleasure taking care of you today.  I've included a brief summary of our discussion and care plan from today's visit below.  Please review this information with your primary care provider.  ______________________________________________________________________    My recommendations are summarized as follows:    --Omeprazole 40 once daily    GERD Lifestyle Modifications  -- Avoid foods high in fat or high fructose corn syrup  -- do not eat within three hours of laying down or going to bed  -- raise the head of your bed 6-8 inches  -- avoid alcohol  -- aim for BMI of less than <25 and lose extra weight as needed    -- meet with GI dietitian   -- please see scheduling information provided below     Return to GI Clinic in 4 months to review your progress.    ______________________________________________________________________    How do I schedule labs, imaging studies, or procedures that were ordered in clinic today?     Labs: To schedule lab appointment at the [facility], use my chart or call [phone]. If you have a [facility] lab closer to home where you are regularly seen you can give them a call.     Procedures: If a colonoscopy, upper endoscopy, breath test, esophageal manometry, or pH impedence was ordered today, our endoscopy team will call you to schedule this. If you have not heard from our endoscopy team within a week, please call [phone] to schedule.     Imaging Studies: If you were scheduled for a CT scan, X-ray, MRI, ultrasound, HIDA scan or other imaging study, please call [phone] to have this scheduled.     Referral: If a referral to another specialty was ordered, expect a phone call or follow instructions above. If you have not heard from anyone regarding your referral in a week, please call our clinic to check the status.     Who do I call with any questions after my visit?  Please be in touch if there are any further questions that arise following today's visit.   There are multiple ways to contact your gastroenterology care team.        During business hours, you may reach a Gastroenterology nurse at [phone]      To schedule or reschedule an appointment, please call [phone].       You can always send a secure message through GetBulb.  [employer] messages are answered by your nurse or doctor typically within 24 hours.  Please allow extra time on weekends and holidays.        For urgent/emergent questions after business hours, you may reach the on-call GI Fellow by contacting the [facility]  at [phone].     How will I get the results of any tests ordered?    You will receive all of your results.  If you have signed up for [employer], any tests ordered at your visit will be available to you after your physician reviews them.  Typically this takes 1-2 weeks.  If there are urgent results that require a change in your care plan, your physician or nurse will call you to discuss the next steps.      What is Get[employer]?  [employer] is a secure way for you to access all of your healthcare records from the [facility].  It is a web based computer program, so you can sign on to it from any location.  It also allows you to send secure messages to your care team.  I recommend signing up for Get[employer] access if you have not already done so and are comfortable with using a computer.      How to I schedule a follow-up visit?  If you did not schedule a follow-up visit today, please call [phone] to schedule a follow-up office visit.      Sincerely,    Stephanie Hernandez PA-C  Division of Gastroenterology, Hepatology & Nutrition  [facility]

## 2021-12-01 ENCOUNTER — VIRTUAL VISIT (OUTPATIENT)
Dept: GASTROENTEROLOGY | Facility: CLINIC | Age: 36
End: 2021-12-01
Payer: COMMERCIAL

## 2021-12-01 DIAGNOSIS — Z87.898 HISTORY OF CHRONIC COUGH: ICD-10-CM

## 2021-12-01 DIAGNOSIS — R14.0 BLOATING: ICD-10-CM

## 2021-12-01 DIAGNOSIS — K21.9 GASTROESOPHAGEAL REFLUX DISEASE, UNSPECIFIED WHETHER ESOPHAGITIS PRESENT: ICD-10-CM

## 2021-12-01 DIAGNOSIS — R19.5 LOOSE STOOLS: Primary | ICD-10-CM

## 2021-12-01 DIAGNOSIS — K21.9 GASTROESOPHAGEAL REFLUX DISEASE WITHOUT ESOPHAGITIS: ICD-10-CM

## 2021-12-01 PROCEDURE — 97802 MEDICAL NUTRITION INDIV IN: CPT | Mod: GT | Performed by: DIETITIAN, REGISTERED

## 2021-12-01 NOTE — PROGRESS NOTES
Komal is a 36 year old who is being evaluated via a billable video visit.      How would you like to obtain your AVS? MyChart  If the video visit is dropped, the invitation should be resent by: Send to e-mail at: [email or web address]  Will anyone else be joining your video visit? No    Video Start Time: 3:03 PM  Video-Visit Details    Type of service:  Video Visit    Video End Time:4:04 PM    Originating Location (pt. Location): Home    Distant Location (provider location):  [facility] [place]     Platform used for Video Visit: Digital Envoy     [facility] Outpatient Medical Nutrition Therapy      Time Spent:  61 minutes  Session Type:  Initial   Referring Physician:  Stephanie Hernandez PA-C  Reason for RD Visit:   GERD and IBS     Nutrition Assessment:  Patient is a 36 year old female with history that includes GERD with esophagitis, chronic cough and reported hx of irritable bowel syndrome dx at 11 years old. She stated that she didn't noticed significant symptoms related to GERD but had chronic cough and noticed some throat closing so saw ENT, had allergy testing and then met with GI and found to have GERD with esophagitis. She stated her recent testing show esophagitis is healing now. She stated in the past when she would eat lunch at the salad bar at work, she noticed worsening cough symptoms prior to her diagnosis. She also notes that she used to put raw onion on her salads which she has now discovered that she does not tolerate onions. She stated that she also has hx of IBS and has ongoing loose stool, usually 3 loose BMs per day. She also has some bloating as well. She followed a low FODMAP diet for 2 months and found that garlic, onions, lactose and sugars cause worsening symptoms for her. She stated that she also notices worsening symptoms when she's more stressed. 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 When she is up all night, then she takes small bites of foods/snacks every 2-3 hours as she feel unwell if she doesn't when up all night. She tends to eat about 4-5 times per day, but what she eats can vary widely and may just be crackers or bowl of oatmeal or cereal for a meal. She stated that in high school and college, she worked through some disordered eating, but reported improved/resolved. She would like to keep some general journals to help give her an idea of overall intake but due to her hx prefers not to have to track specific portion for example.    Height:   Ht Readings from Last 1 Encounters:   09/17/21 1.695 m (5' 6.75\")   ]  Weight:  Wt Readings from Last 10 Encounters:   11/09/21 86.2 kg (190 lb)   09/17/21 91.9 kg (202 lb 9.6 oz)   06/11/21 86.2 kg (190 lb)   05/20/21 94.1 kg (207 lb 7.3 oz)   05/17/21 94.3 kg (208 lb)   04/27/21 90.7 kg (200 lb)   03/24/21 96.6 kg (213 lb)   06/23/20 94.8 kg (209 lb)   02/20/20 94.8 kg (209 lb)   02/03/20 93 kg (205 lb)     BMI: Estimated body mass index is 29.98 kg/m  as calculated from the following:    Height as of 9/17/21: 1.695 m (5' 6.75\").    Weight as of 11/9/21: 86.2 kg (190 lb).    Diet Recall:  (some usual/recent meals/snacks/beverages):  Meal Food    Breakfast 2 eggs and slice toast OR PB/almond butter packet by itself OR PB toast   Lunch Maybe a little later: Salad or chicken, rice and broccoli or chicken burrito bowl or baked potato with jasmina and salsa or leftovers   Dinner Pasta with ground turkey and sauce or bowl of oatmeal or stir smith or quinoa, altamirano beans, ground turkey or oven pancakes/swedish pancakes   Snacks Afternoon (between L and D): chips and salsa or baked pumpkin bar/homemade baked good or wheat thins and then before bed 1/2 PB sandwdich or oatmeal or cereal or leftover (mac and cheese and tuna or eggs   Beverages Caramel latte, 16-20 oz water   Alcohol Intake 1-2 drink, 1-2 times per month (barney or " hard cider).      Labs:    Last Comprehensive Metabolic Panel:  Sodium   Date Value Ref Range Status   03/24/2021 140 136 - 145 mmol/L Final     Potassium   Date Value Ref Range Status   03/24/2021 4.2 3.5 - 5.0 mmol/L Final     Chloride   Date Value Ref Range Status   03/24/2021 108 (H) 98 - 107 mmol/L Final     Carbon Dioxide (CO2)   Date Value Ref Range Status   03/24/2021 24 22 - 31 mmol/L Final     Anion Gap   Date Value Ref Range Status   03/24/2021 8 5 - 18 mmol/L Final     Glucose   Date Value Ref Range Status   03/24/2021 73 70 - 125 mg/dL Final     Urea Nitrogen   Date Value Ref Range Status   03/24/2021 11 8 - 22 mg/dL Final     Creatinine   Date Value Ref Range Status   03/24/2021 0.75 0.60 - 1.10 mg/dL Final     GFR Estimate   Date Value Ref Range Status   03/24/2021 >60 >60 mL/min/1.73m2 Final     Calcium   Date Value Ref Range Status   03/24/2021 8.7 8.5 - 10.5 mg/dL Final     CBC RESULTS:   Recent Labs   Lab Test 03/24/21  1120   WBC 5.2   RBC 4.40   HGB 13.9   HCT 42.1   MCV 96   MCH 31.6   MCHC 33.0   RDW 12.3          Pertinent Medications/vitamin and mineral supplements:      Current Outpatient Medications   Medication     citalopram (CELEXA) 20 MG tablet     omeprazole (PRILOSEC) 20 MG DR capsule     omeprazole (PRILOSEC) 40 MG DR capsule     No current facility-administered medications for this visit.       Food Allergies:  NKFA   Physical Activity:  5-6 days per week exercises (did not discuss specifics of exercise/routines today).    MALNUTRITION:  % Weight Loss:  Weight loss does not meet criteria for malnutrition   % Intake:  No decreased intake noted  Subcutaneous Fat Loss:  None observed  Muscle Loss:  None observed  Fluid Retention:  None noted    Malnutrition Diagnosis: Patient does not meet two of the above criteria necessary for diagnosing malnutrition  In Context of:  Acute illness or injury  Chronic illness or disease    Nutrition Diagnosis:    Food and nutrition related  knowledge deficit related to lack of previous diet education/lack of complete recall of previous diet education as evidenced by pt report and interest in diet education/review.     Nutrition Prescription: GERD and IBS and weight mgmt guidelines    Nutrition Intervention:    Nutrition Education/Counseling:  Provided diet education for IBS, GERD and weight mgmt. Discussed some specific tips and suggestions based on her preferences and schedule to help manage symptoms and work towards weight loss. Discussed eating smaller meals spread out throughout the day, gradually increasing fiber especially including some soluble fiber as well as fiber supps.     Patient verbalized understanding of education provided. See Goals below.     Goals:    1. Continue eating 4-5 smaller meals (or if you prefer a few smaller meals and 1-2 planned snacks) spread out throughout the day.    2. Aim for eating your smaller meals/meals and snacks on a consistent eating pattern.  For example:  --Eat breakfast within 60-90 minutes of getting up and then can have a small meal/snack about every 4 hours or sooner if hungry and up all night for a birth).  --Especially plan ahead for some good snack choices when staying up at night for a birth.    3. Aim for planning and preparing meals ahead of time to help you stick to getting smaller but also balanced meals.    4. Keep food and beverage journals for the next month to help you get a general idea of what you are eating and drinking daily. Can also write in any symptoms you experience which may help you identify some specific food and beverage triggers or patterns of eating contributing to/causing bloating and loose stools or swallowing issues/cough.    5. Increase fluids such as water, electrolyte water to drink at least 48 oz-64 oz per day.    6. To help decrease some calories, could ask to decrease the caramel pumps further in your latte, and/or can decrease the number of days you get the latte and  make coffee at home more often than getting the latte.  (note: caffeine can contribute to GERD symptoms and possibly loose stools as well).    7. Continue to limit/avoid garlic, onions and continue lactose free/low lactose diet.  Nutrition Monitoring and Evaluation: Will monitor adherence to nutrition recommendations at future RD visits.     Further Medical Nutrition Therapy:  Follow-up scheduled Jan 12, 2022 at 3 PM.    Patient was encouraged to call/contact RD with any further questions.    Renetta Garland MS, RD, LD

## 2021-12-01 NOTE — PATIENT INSTRUCTIONS
It was nice meeting you today. Below are the nutrition recommendations we discussed at your visit.    Please let me know if you have any additional questions.    Nutrition Recommendations    1. Continue eating 4-5 smaller meals (or if you prefer a few smaller meals and 1-2 planned snacks) spread out throughout the day.    2. Aim for eating your smaller meals/meals and snacks on a consistent eating pattern.  For example:  --Eat breakfast within 60-90 minutes of getting up and then can have a small meal/snack about every 4 hours or sooner if hungry and up all night for a birth).  --Especially plan ahead for some good snack choices when staying up at night for a birth.    3. Aim for planning and preparing meals ahead of time to help you stick to getting smaller but also balanced meals.    4. Keep food and beverage journals for the next month to help you get a general idea of what you are eating and drinking daily. Can also write in any symptoms you experience which may help you identify some specific food and beverage triggers or patterns of eating contributing to/causing bloating and loose stools or swallowing issues/cough.    5. Increase fluids such as water, electrolyte water to drink at least 48 oz-64 oz per day.    6. To help decrease some calories, could ask to decrease the caramel pumps further in your latte, and/or can decrease the number of days you get the latte and make coffee at home more often than getting the latte.  (note: caffeine can contribute to GERD symptoms and possibly loose stools as well).    7. Continue to limit/avoid garlic, onions and continue lactose free/low lactose diet.    Your follow up appointment is scheduled for January 12, 2022 at 3:00 PM. If you need to make any changes to your appointment, please call [phone].    Renetta Garland, MS, RD, LD

## 2021-12-01 NOTE — LETTER
12/1/2021         RE: Komal Parra  [address] [address]        Dear Colleague,    Thank you for referring your patient, Komal Parra, to the [facility] [place]. Please see a copy of my visit note below.  [facility] Outpatient Medical Nutrition Therapy      Time Spent:  61 minutes  Session Type:  Initial   Referring Physician:  Stephanie Hernandez PA-C  Reason for RD Visit:   GERD and IBS     Nutrition Assessment:  Patient is a 36 year old female with history that includes GERD with esophagitis, chronic cough and reported hx of irritable bowel syndrome dx at 11 years old. She stated that she didn't noticed significant symptoms related to GERD but had chronic cough and noticed some throat closing so saw ENT, had allergy testing and then met with GI and found to have GERD with esophagitis. She stated her recent testing show esophagitis is healing now. She stated in the past when she would eat lunch at the salad bar at work, she noticed worsening cough symptoms prior to her diagnosis. She also notes that she used to put raw onion on her salads which she has now discovered that she does not tolerate onions. She stated that she also has hx of IBS and has ongoing loose stool, usually 3 loose BMs per day. She also has some bloating as well. She followed a low FODMAP diet for 2 months and found that garlic, onions, lactose and sugars cause worsening symptoms for her. She stated that she also notices worsening symptoms when she's more stressed. 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 When she is up all night, then she takes small bites of foods/snacks every 2-3 hours as she feel unwell if she doesn't when up all night. She tends to eat about 4-5 times per day, but what she eats can vary widely and may just be crackers or bowl of oatmeal or cereal for a  "meal. She stated that in high school and college, she worked through some disordered eating, but reported improved/resolved. She would like to keep some general journals to help give her an idea of overall intake but due to her hx prefers not to have to track specific portion for example.    Height:   Ht Readings from Last 1 Encounters:   09/17/21 1.695 m (5' 6.75\")   ]  Weight:  Wt Readings from Last 10 Encounters:   11/09/21 86.2 kg (190 lb)   09/17/21 91.9 kg (202 lb 9.6 oz)   06/11/21 86.2 kg (190 lb)   05/20/21 94.1 kg (207 lb 7.3 oz)   05/17/21 94.3 kg (208 lb)   04/27/21 90.7 kg (200 lb)   03/24/21 96.6 kg (213 lb)   06/23/20 94.8 kg (209 lb)   02/20/20 94.8 kg (209 lb)   02/03/20 93 kg (205 lb)     BMI: Estimated body mass index is 29.98 kg/m  as calculated from the following:    Height as of 9/17/21: 1.695 m (5' 6.75\").    Weight as of 11/9/21: 86.2 kg (190 lb).    Diet Recall:  (some usual/recent meals/snacks/beverages):  Meal Food    Breakfast 2 eggs and slice toast OR PB/almond butter packet by itself OR PB toast   Lunch Maybe a little later: Salad or chicken, rice and broccoli or chicken burrito bowl or baked potato with jasmina and salsa or leftovers   Dinner Pasta with ground turkey and sauce or bowl of oatmeal or stir smith or quinoa, altamirano beans, ground turkey or oven pancakes/swedish pancakes   Snacks Afternoon (between L and D): chips and salsa or baked pumpkin bar/homemade baked good or wheat thins and then before bed 1/2 PB sandwdich or oatmeal or cereal or leftover (mac and cheese and tuna or eggs   Beverages Caramel latte, 16-20 oz water   Alcohol Intake 1-2 drink, 1-2 times per month (barney or hard cider).      Labs:    Last Comprehensive Metabolic Panel:  Sodium   Date Value Ref Range Status   03/24/2021 140 136 - 145 mmol/L Final     Potassium   Date Value Ref Range Status   03/24/2021 4.2 3.5 - 5.0 mmol/L Final     Chloride   Date Value Ref Range Status   03/24/2021 108 (H) 98 - 107 mmol/L " Final     Carbon Dioxide (CO2)   Date Value Ref Range Status   03/24/2021 24 22 - 31 mmol/L Final     Anion Gap   Date Value Ref Range Status   03/24/2021 8 5 - 18 mmol/L Final     Glucose   Date Value Ref Range Status   03/24/2021 73 70 - 125 mg/dL Final     Urea Nitrogen   Date Value Ref Range Status   03/24/2021 11 8 - 22 mg/dL Final     Creatinine   Date Value Ref Range Status   03/24/2021 0.75 0.60 - 1.10 mg/dL Final     GFR Estimate   Date Value Ref Range Status   03/24/2021 >60 >60 mL/min/1.73m2 Final     Calcium   Date Value Ref Range Status   03/24/2021 8.7 8.5 - 10.5 mg/dL Final     CBC RESULTS:   Recent Labs   Lab Test 03/24/21  1120   WBC 5.2   RBC 4.40   HGB 13.9   HCT 42.1   MCV 96   MCH 31.6   MCHC 33.0   RDW 12.3          Pertinent Medications/vitamin and mineral supplements:      Current Outpatient Medications   Medication     citalopram (CELEXA) 20 MG tablet     omeprazole (PRILOSEC) 20 MG DR capsule     omeprazole (PRILOSEC) 40 MG DR capsule     No current facility-administered medications for this visit.       Food Allergies:  NKFA   Physical Activity:  5-6 days per week exercises (did not discuss specifics of exercise/routines today).    MALNUTRITION:  % Weight Loss:  Weight loss does not meet criteria for malnutrition   % Intake:  No decreased intake noted  Subcutaneous Fat Loss:  None observed  Muscle Loss:  None observed  Fluid Retention:  None noted    Malnutrition Diagnosis: Patient does not meet two of the above criteria necessary for diagnosing malnutrition  In Context of:  Acute illness or injury  Chronic illness or disease    Nutrition Diagnosis:    Food and nutrition related knowledge deficit related to lack of previous diet education/lack of complete recall of previous diet education as evidenced by pt report and interest in diet education/review.     Nutrition Prescription: GERD and IBS and weight mgmt guidelines    Nutrition Intervention:    Nutrition  Education/Counseling:  Provided diet education for IBS, GERD and weight mgmt. Discussed some specific tips and suggestions based on her preferences and schedule to help manage symptoms and work towards weight loss. Discussed eating smaller meals spread out throughout the day, gradually increasing fiber especially including some soluble fiber as well as fiber supps.     Patient verbalized understanding of education provided. See Goals below.     Goals:    1. Continue eating 4-5 smaller meals (or if you prefer a few smaller meals and 1-2 planned snacks) spread out throughout the day.    2. Aim for eating your smaller meals/meals and snacks on a consistent eating pattern.  For example:  --Eat breakfast within 60-90 minutes of getting up and then can have a small meal/snack about every 4 hours or sooner if hungry and up all night for a birth).  --Especially plan ahead for some good snack choices when staying up at night for a birth.    3. Aim for planning and preparing meals ahead of time to help you stick to getting smaller but also balanced meals.    4. Keep food and beverage journals for the next month to help you get a general idea of what you are eating and drinking daily. Can also write in any symptoms you experience which may help you identify some specific food and beverage triggers or patterns of eating contributing to/causing bloating and loose stools or swallowing issues/cough.    5. Increase fluids such as water, electrolyte water to drink at least 48 oz-64 oz per day.    6. To help decrease some calories, could ask to decrease the caramel pumps further in your latte, and/or can decrease the number of days you get the latte and make coffee at home more often than getting the latte.  (note: caffeine can contribute to GERD symptoms and possibly loose stools as well).    7. Continue to limit/avoid garlic, onions and continue lactose free/low lactose diet.  Nutrition Monitoring and Evaluation: Will monitor  adherence to nutrition recommendations at future RD visits.     Further Medical Nutrition Therapy:  Follow-up scheduled Jan 12, 2022 at 3 PM.    Patient was encouraged to call/contact RD with any further questions.    Renetta Garland, MS, RD, LD          Again, thank you for allowing me to participate in the care of your patient.      Sincerely,    Renetta Garland RD

## 2022-01-26 ENCOUNTER — OFFICE VISIT (OUTPATIENT)
Dept: FAMILY MEDICINE | Facility: CLINIC | Age: 37
End: 2022-01-26
Payer: COMMERCIAL

## 2022-01-26 VITALS
TEMPERATURE: 98.6 F | WEIGHT: 208.8 LBS | RESPIRATION RATE: 16 BRPM | SYSTOLIC BLOOD PRESSURE: 110 MMHG | HEART RATE: 65 BPM | BODY MASS INDEX: 32.95 KG/M2 | OXYGEN SATURATION: 99 % | DIASTOLIC BLOOD PRESSURE: 61 MMHG

## 2022-01-26 DIAGNOSIS — J02.9 PHARYNGITIS, UNSPECIFIED ETIOLOGY: Primary | ICD-10-CM

## 2022-01-26 DIAGNOSIS — R05.9 COUGH: ICD-10-CM

## 2022-01-26 DIAGNOSIS — K21.9 GASTROESOPHAGEAL REFLUX DISEASE WITHOUT ESOPHAGITIS: ICD-10-CM

## 2022-01-26 LAB
DEPRECATED S PYO AG THROAT QL EIA: NEGATIVE
FLUAV AG SPEC QL IA: NEGATIVE
FLUBV AG SPEC QL IA: NEGATIVE

## 2022-01-26 PROCEDURE — 99214 OFFICE O/P EST MOD 30 MIN: CPT | Performed by: FAMILY MEDICINE

## 2022-01-26 PROCEDURE — 87804 INFLUENZA ASSAY W/OPTIC: CPT | Performed by: FAMILY MEDICINE

## 2022-01-26 PROCEDURE — U0003 INFECTIOUS AGENT DETECTION BY NUCLEIC ACID (DNA OR RNA); SEVERE ACUTE RESPIRATORY SYNDROME CORONAVIRUS 2 (SARS-COV-2) (CORONAVIRUS DISEASE [COVID-19]), AMPLIFIED PROBE TECHNIQUE, MAKING USE OF HIGH THROUGHPUT TECHNOLOGIES AS DESCRIBED BY CMS-2020-01-R: HCPCS | Performed by: FAMILY MEDICINE

## 2022-01-26 PROCEDURE — U0005 INFEC AGEN DETEC AMPLI PROBE: HCPCS | Performed by: FAMILY MEDICINE

## 2022-01-26 PROCEDURE — 87651 STREP A DNA AMP PROBE: CPT | Performed by: FAMILY MEDICINE

## 2022-01-26 RX ORDER — GUAIFENESIN 200 MG/10ML
200 LIQUID ORAL EVERY 4 HOURS PRN
Qty: 240 ML | Refills: 0 | Status: SHIPPED | OUTPATIENT
Start: 2022-01-26 | End: 2023-01-03

## 2022-01-26 RX ORDER — PREDNISONE 20 MG/1
TABLET ORAL
Qty: 6 TABLET | Refills: 0 | Status: SHIPPED | OUTPATIENT
Start: 2022-01-26 | End: 2023-01-03

## 2022-01-26 NOTE — PATIENT INSTRUCTIONS
Komal,    I sent prednisone to your pharmacy. This is an anti-inflammatory medicine  You an use the Robitussin with codeine as needed  Please let me know if not improving    Malcolm Lo MD

## 2022-01-27 ENCOUNTER — TELEPHONE (OUTPATIENT)
Dept: NURSING | Facility: CLINIC | Age: 37
End: 2022-01-27
Payer: COMMERCIAL

## 2022-01-27 ENCOUNTER — TELEPHONE (OUTPATIENT)
Dept: FAMILY MEDICINE | Facility: CLINIC | Age: 37
End: 2022-01-27
Payer: COMMERCIAL

## 2022-01-27 DIAGNOSIS — R05.9 COUGH: Primary | ICD-10-CM

## 2022-01-27 LAB
GROUP A STREP BY PCR: NOT DETECTED
SARS-COV-2 RNA RESP QL NAA+PROBE: POSITIVE

## 2022-01-27 RX ORDER — CODEINE PHOSPHATE AND GUAIFENESIN 10; 100 MG/5ML; MG/5ML
1-2 SOLUTION ORAL EVERY 4 HOURS PRN
Qty: 240 ML | Refills: 0 | Status: SHIPPED | OUTPATIENT
Start: 2022-01-27 | End: 2023-01-03

## 2022-01-27 NOTE — TELEPHONE ENCOUNTER
Patient calling because she was told at her appt with Dr Lo yesterday that he was going to prescribe for her Robitussin with Codeine.  When she went to fill it she realized it was just Robitussin.  Is this right?  Also, patient would like to know if taking this wihile covid positive will have any repercussions.    Please call patient to advise  Ok to leave a detailed message

## 2022-01-27 NOTE — TELEPHONE ENCOUNTER
----- Message from Malcolm Lo MD sent at 1/27/2022  1:03 PM CST -----  Please make sure patient is aware that her Covid-19 test is positive.  She should follow CDC recommendations for isolation and continue symptomatic cares.    Malcolm Lo MD

## 2022-01-27 NOTE — TELEPHONE ENCOUNTER
Please follow-up.  I had prescribed 2 medications, prednisone, and Robitussin.  She can take both even though diagnosed with Covid.  Also, I had meant to send Robitussin with Codeine. That would help with some of her symptoms of discomfort that she reported.     I did just send a new prescription which she can  if needed. She can use what she has and only fill it if uncomfortable. Codeine can contribute to nausea and make her drowsy.

## 2022-01-27 NOTE — PROGRESS NOTES
Assessment/ Plan     1. Pharyngitis, unspecified etiology  2. Cough    Rapid strep test is negative and throat culture is pending  Influenza tests are negative  A chest x-ray is negative for acute infiltrate per this was personally reviewed and will be reviewed by radiology  Recommend symptomatic treatment with prednisone given her recent symptoms  She was given a prescription for Robitussin with codeine.  Discussed this could cause drowsiness  We will await the Covid-19 test.    - Streptococcus A Rapid Scr w Reflx to PCR - Lab Collect; Future  - Streptococcus A Rapid Scr w Reflx to PCR - Lab Collect  - Group A Streptococcus PCR Throat Swab    - XR Chest 2 Views; Future  - Symptomatic; Yes; 1/22/2022 COVID-19 Virus (Coronavirus) by PCR Nasopharyngeal; Future  - Influenza A & B Antigen - Clinic Collect  - Symptomatic; Yes; 1/22/2022 COVID-19 Virus (Coronavirus) by PCR Nose  - predniSONE (DELTASONE) 20 MG tablet; Take one PO BID x 3 days  Dispense: 6 tablet; Refill: 0  - guaiFENesin (ROBITUSSIN) 100 MG/5ML liquid; Take 10 mLs (200 mg) by mouth every 4 hours as needed for cough  Dispense: 240 mL; Refill: 0    3.  Gastroesophageal reflux disease    She had a recent endoscopy  Omeprazole was decreased to 20 mg but had to be increased to 40 mg given breakthrough symptoms    Addendum:    The Covid-19 test was positive.  Results were communicated to the patient  Recommend following CDC isolation recommendations    Spent 30 minutes including chart preparation and review as well as time with patient      Subjective:      Komal Parra is a 36 year old female who presents to the clinic with a recent constellation of symptoms.  Her primary physician is Dr. Guzman.  She reports that he has developed a mild sore throat and has developed ear pain.  She has developed a sense that her lungs are burning as well.  She recently had a negative home Covid test.  She has been taking Mucinex and using a Marisol pot.  She has had a dry  cough and feels like she has bronchitis. She denies fever.  She denies loss of taste or smell.  She has not felt achy.  She does not feel significantly short of breath.  She has received the Covid-19 vaccines including the booster on January 22.  She is a non-smoker.    More recently, she had follow-up for an endoscopy.  She does have a history of esophagitis which has been treated with omeprazole.  Omeprazole was decreased to 20 mg but had to be increased to 40 mg given recurrence of symptoms.    The following portions of the patient's history were reviewed and updated as appropriate: allergies, current medications, past family history, past medical history, past social history, past surgical history and problem list. Medications have been reconciled    Review of Systems   A 12 point comprehensive review of systems was negative except as noted.      Current Outpatient Medications   Medication Sig Dispense Refill     citalopram (CELEXA) 20 MG tablet Take 1 tablet (20 mg) by mouth daily 90 tablet 2     guaiFENesin (ROBITUSSIN) 100 MG/5ML liquid Take 10 mLs (200 mg) by mouth every 4 hours as needed for cough 240 mL 0     omeprazole (PRILOSEC) 40 MG DR capsule Take 1 capsule (40 mg) by mouth daily 90 capsule 3     predniSONE (DELTASONE) 20 MG tablet Take one PO BID x 3 days 6 tablet 0       Objective:     /61 (BP Location: Left arm, Patient Position: Sitting, Cuff Size: Adult Regular)   Pulse 65   Temp 98.6  F (37  C) (Oral)   Resp 16   Wt 94.7 kg (208 lb 12.8 oz)   SpO2 99%   BMI 32.95 kg/m      General appearance: alert, appears stated age   Head: normocephalic, without obvious abnormality, atraumatic  Eyes: conjunctivae/corneas clear. PERRL, EOM's intact.   Ears: normal TM's and external ear canals both ears  Nose: nares normal. Septum midline. Mucosa normal.  Throat: lips, mucosa, and tongue normal; teeth and gums normal  Neck: no adenopathy, supple, symmetrical, trachea midline and thyroid not  enlarged, symmetric   Lungs: clear to auscultation bilaterally  Heart: regular rate and rhythm, S1, S2 normal, no murmur, click, rub or gallop  Extremities: extremities normal, atraumatic, no cyanosis or edema  Skin: skin color, texture, turgor normal  Lymph nodes: Cervical nodes normal.  Neurologic: Alert and oriented X 3           Recent Results (from the past 168 hour(s))   Influenza A & B Antigen - Clinic Collect    Specimen: Nasopharyngeal; Swab   Result Value Ref Range    Influenza A antigen Negative Negative    Influenza B antigen Negative Negative   Symptomatic; Yes; 1/22/2022 COVID-19 Virus (Coronavirus) by PCR Nose    Specimen: Nose; Swab   Result Value Ref Range    SARS CoV2 PCR Positive (A) Negative, Testing sent to reference lab. Results will be returned via unsolicited result   Streptococcus A Rapid Scr w Reflx to PCR - Lab Collect    Specimen: Throat; Swab   Result Value Ref Range    Group A Strep antigen Negative Negative   Group A Streptococcus PCR Throat Swab    Specimen: Throat; Swab   Result Value Ref Range    Group A strep by PCR Not Detected Not Detected          This note has been dictated using voice recognition software. Any grammatical or context distortions are unintentional and inherent to the software    Malcolm Lo MD

## 2022-01-27 NOTE — TELEPHONE ENCOUNTER
"Coronavirus (COVID-19) Notification    Caller Name (Patient, parent, daughter/son, grandparent, etc)  Komal     Reason for call  Notify of Positive Coronavirus (COVID-19) lab results, assess symptoms,  review  [employer] [facility] recommendations    Lab Result    Lab test:  2019-nCoV rRt-PCR or SARS-CoV-2 PCR    Oropharyngeal AND/OR nasopharyngeal swabs is POSITIVE for 2019-nCoV RNA/SARS-COV-2 PCR (COVID-19 virus)    RN Recommendations/Instructions per [facility] Coronavirus COVID-19 recommendations    Brief introduction script  Introduce self then review script:  \"I am calling on behalf of [employer].  We were notified that your Coronavirus test (COVID-19) for was POSITIVE for the virus.  I have some information to relay to you but first I wanted to mention that the [place] Dept of Health will be contacting you shortly [it's possible MD already called Patient] to talk to you more about how you are feeling and other people you have had contact with who might now also have the virus.  Also,  [employer] is Partnering with the [facility] for Covid-19 research, you may be contacted directly by research staff.\"      Assessment (Inquire about Patient's current symptoms)   Assessment   Current Symptoms at time of phone call: (if no symptoms, document No symptoms] Yes   Symptoms onset (if applicable) 1/23/22     If at time of call, Patients symptoms hare worsened, the Patient should contact 911 or have someone drive them to Emergency Dept promptly:      If Patient calling 911, inform 911 personal that you have tested positive for the Coronavirus (COVID-19).  Place mask on and await 911 to arrive.    If Emergency Dept, If possible, please have another adult drive you to the Emergency Dept but you need to wear mask when in contact with other people.          Treatment Options:   Patient classified as COVID treatment eligible by Epic high risk algorithm: No  Is the patient symptomatic at the time of result " notification? No    Review information with Patient    Your result was positive. This means you have COVID-19 (coronavirus).  We have sent you a letter that reviews the information that I'll be reviewing with you now.    How can I protect others?    If you have symptoms: stay home and away from others (self-isolate) until:    You've had no fever--and no medicine that reduces fever--for 1 full day (24 hours). And       Your other symptoms have gotten better. For example, your cough or breathing has improved. And     At least 10 days have passed since your symptoms started. (If you've been told by a doctor that you have a weak immune system, wait 20 days.)     If you don't have symptoms: Stay home and away from others (self-isolate) until at least 10 days have passed since your first positive COVID-19 test. (Date test collected)    During this time:    Stay in your own room, including for meals. Use your own bathroom if you can.    Stay away from others in your home. No hugging, kissing or shaking hands. No visitors.     Don't go to work, school or anywhere else.     Clean  high touch  surfaces often (doorknobs, counters, handles, etc.). Use a household cleaning spray or wipes. You'll find a full list on the EPA website at www.epa.gov/pesticide-registration/list-n-disinfectants-use-against-sars-cov-2.     Cover your mouth and nose with a mask, tissue or other face covering to avoid spreading germs.    Wash your hands and face often with soap and water.    Make a list of people you have been in close contact with recently, even if either of you wore a face covering.   - Start your list from 2 days before you became ill or had a positive test.  - Include anyone that was within 6 feet of you for a cumulative total of 15 minutes or more in 24 hours. (Example: if you sat next to Garry for 5 minutes in the morning and 10 minutes in the afternoon, then you were in close contact for 15 minutes total that day. Garry would be  added to your list.)    A public health worker will call or text you. It is important that you answer. They will ask you questions about possible exposures to COVID-19, such as people you have been in direct contact with and places you have visited.    Tell the people on your list that you have COVID-19; they should stay away from others for 14 days starting from the last time they were in contact with you (unless you are told something different from a public health worker).     Caregivers in these groups are at risk for severe illness due to COVID-19:  o People 65 years and older  o People who live in a nursing home or long-term care facility  o People with chronic disease (lung, heart, cancer, diabetes, kidney, liver, immunologic)  o People who have a weakened immune system, including those who:  - Are in cancer treatment  - Take medicine that weakens the immune system, such as corticosteroids  - Had a bone marrow or organ transplant  - Have an immune deficiency  - Have poorly controlled HIV or AIDS  - Are obese (body mass index of 40 or higher)  - Smoke regularly    Caregivers should wear gloves while washing dishes, handling laundry and cleaning bedrooms and bathrooms.    Wash and dry laundry with special caution. Don't shake dirty laundry, and use the warmest water setting you can.    If you have a weakened immune system, ask your doctor about other actions you should take.    For more tips, go to www.cdc.gov/coronavirus/2019-ncov/downloads/10Things.pdf.    You should not go back to work until you meet the guidelines above for ending your home isolation. You don't need to be retested for COVID-19 before going back to work--studies show that you won't spread the virus if it's been at least 10 days since your symptoms started (or 20 days, if you have a weak immune system).    Employers: This document serves as formal notice of your employee's medical guidelines for going back to work. They must meet the above  guidelines before going back to work in person.    How can I take care of myself?    1. Get lots of rest. Drink extra fluids (unless a doctor has told you not to).    2. Take Tylenol (acetaminophen) for fever or pain. If you have liver or kidney problems, ask your family doctor if it's okay to take Tylenol.     Take either:     650 mg (two 325 mg pills) every 4 to 6 hours, or     1,000 mg (two 500 mg pills) every 8 hours as needed.     Note: Don't take more than 3,000 mg in one day. Acetaminophen is found in many medicines (both prescribed and over-the-counter medicines). Read all labels to be sure you don't take too much.    For children, check the Tylenol bottle for the right dose (based on their age or weight).    3. If you have other health problems (like cancer, heart failure, an organ transplant or severe kidney disease): Call your specialty clinic if you don't feel better in the next 2 days.    4. Know when to call 911: Emergency warning signs include:    Trouble breathing or shortness of breath    Pain or pressure in the chest that doesn't go away    Feeling confused like you haven't felt before, or not being able to wake up    Bluish-colored lips or face    5. Sign up for [employer]. We know it's scary to hear that you have COVID-19. We want to track your symptoms to make sure you're okay over the next 2 weeks. Please look for an email from [employer]sweetlink--this is a free, online program that we'll use to keep in touch. To sign up, follow the link in the email. Learn more at [email or web address].    Where can I get more information?    [facility]: [email or web address]    Coronavirus Basics: www.health.[facility].[place]/diseases/coronavirus/basics.html    What to Do If You're Sick: www.cdc.gov/coronavirus/2019-ncov/about/steps-when-sick.html    Ending Home Isolation: www.cdc.gov/coronavirus/2019-ncov/hcp/disposition-in-home-patients.html     Caring for Someone with COVID-19:  www.cdc.gov/coronavirus/2019-ncov/if-you-are-sick/care-for-someone.html     [facility] clinical trials (COVID-19 research studies): clinicalaffairs.[facility]-clinical-trials     A Positive COVID-19 letter will be sent via [employer] or the mail. (Exception, no letters sent to Presurgerical/Preprocedure Patients)    Debora Smith

## 2022-03-15 ENCOUNTER — VIRTUAL VISIT (OUTPATIENT)
Dept: GASTROENTEROLOGY | Facility: CLINIC | Age: 37
End: 2022-03-15
Payer: COMMERCIAL

## 2022-03-15 DIAGNOSIS — K21.9 GASTROESOPHAGEAL REFLUX DISEASE WITHOUT ESOPHAGITIS: Primary | ICD-10-CM

## 2022-03-15 DIAGNOSIS — R19.5 LOOSE STOOLS: ICD-10-CM

## 2022-03-15 DIAGNOSIS — R10.9 ABDOMINAL CRAMPING: ICD-10-CM

## 2022-03-15 PROCEDURE — 99213 OFFICE O/P EST LOW 20 MIN: CPT | Mod: GT | Performed by: PHYSICIAN ASSISTANT

## 2022-03-15 RX ORDER — DICYCLOMINE HYDROCHLORIDE 10 MG/1
10 CAPSULE ORAL 4 TIMES DAILY PRN
Qty: 60 CAPSULE | Refills: 3 | Status: SHIPPED | OUTPATIENT
Start: 2022-03-15 | End: 2024-05-02

## 2022-03-15 RX ORDER — OMEPRAZOLE 40 MG/1
40 CAPSULE, DELAYED RELEASE ORAL DAILY
Qty: 90 CAPSULE | Refills: 3 | Status: SHIPPED | OUTPATIENT
Start: 2022-03-15 | End: 2023-01-03

## 2022-03-15 NOTE — PROGRESS NOTES
Komal is a 36 year old who is being evaluated via a billable video visit.      How would you like to obtain your AVS? MyChart  If the video visit is dropped, the invitation should be resent by: Text to cell phone: [phone]  Will anyone else be joining your video visit? No      Video Start Time: 3:11 PM  Video-Visit Details    Type of service:  Video Visit    Video End Time:3:34 PM    Originating Location (pt. Location): Home    Distant Location (provider location):  [facility] [place]     Platform used for Video Visit: Well       GI CLINIC VISIT    CC/REFERRING MD:  Referred Self  REASON FOR CONSULTATION: chronic cough    ASSESSMENT/PLAN:  1. GERD, Chronic cough   Patient reports 2 years of chronic cough certain foods including lettuce, certain types of wheat.  Has been seen by ENT and allergy without a source of symptoms.  Does have significant family history of GERD and father and uncles with esophageal cancer.  Bravo off PPI showed positive study with possible correlation with throat closing and acid reflux events. No correlation with heartburn and cough, study was truncated as the probe was dislodged. DeMeester average was 28.3. Though no association was found between GERD and cough, her study demonstrates underlying reflux.     She overall does feel better with omeprazole 40 mg a day, and at 20 mg a day dosing she had daily symptoms and one episode of severe symptoms. Would recommend continuing 40 mg a day. Discussed risks and benefits of PPI.      Patient also reports continued IBS symptoms with loose stools, abdominal cramping. Would recommend fiber supplementation and bentyl as outlined below. Medication discussed.  --Omeprazole 40 once daily    GERD Lifestyle Modifications  -- Avoid foods high in fat or high fructose corn syrup  -- do not eat within three hours of laying down or going to bed  -- raise the head of your bed 6-8 inches  -- avoid alcohol  -- aim for BMI  "of less than <25 and lose extra weight as needed    -- start fiber with 1 tablet a day for two weeks. Gradually increase to 4 a day    -- take bentyl as needed for abdominal cramping     RTC 9-12 months     Thank you for this consultation.  It was a pleasure to participate in the care of this patient; please contact us with any further questions.     26 Minutes was spent on the date of the encounter during chart review, history and exam, documentation, and further activities as noted       Stephanie Hernandez PA-C  Division of Gastroenterology, Hepatology & Nutrition  [facility]  Komal Parra is a 36 year old presenting for follow-up for chronic cough, GERD, IBS.     Patient reports a chronic cough for 2 years. Symptoms do seem to be getting worse with time.     She has previously been seen by an allergist ([facility] in [place]- gluten, lettuce, preservative allergies were negative) and an ENT, a dietitian also suggested yeast overgrowth. Also notes lettuce seems to be a trigger. She notes that symptoms went away for a while (early April for 2 weeks), but that they came back \"with a vengence\". Symptoms seem to be year round. She also describes sensation of \"lump in her throat\" when she swallows that feels \"mucusy\". She states this is dry cough- and can last for hours after eating. When she coughs, she does not necessarily feal better but she always has this urge to cough. She has been on PPI with pantoprazole which did not help after two months, Flonase did not help. She does get some GERD symptoms related to food triggers. She occasionally will have dysphagia to solid foods if she does not chew properly. No dysphagia to liquids.  Foods that are high in wheat seem to trigger symptoms. She reports that she can typically tolerate white bread, but what bread can make things worse.     She notes that her dad had really severe GERD and  of esophageal cancer, her sister had severe " GERD as well but this improved.     She does note SOB- and feels like she cannot breathe. She will try taking an inhaler- does not make much of a difference. She is able to run without coughing, but with other types of workouts can trigger symptoms.     Notes IBS- cannot eat fried foods, has had this for 20 years. In the beginning of April she was constipated, and she was not coughing during this time. Can be up to four times a day. Will have urgency with bowel movements.     Interval History 9/14/2021:   Was switched from pantoprazole to omeprazole. She took 40 mg BID for one month, then reduced to once daily for a month. Overall is doing well with this.     Has been doing the low FODMAP diet which she has found helpful. Things that are high in sugar do seem to agitate her cough, and irritates her IBS. She is taking omeprazole at bedtime    Interval History 11/10/2021:  After the patient's last visit, she had EGD which showed healing of her grade B esophagitis.     She is now taking omeprazole 20 mg daily- she feels GERD once a day. She does report an episode of chest pain that did not feel like traditional GERD (cramping feeling that came and went) and she considered going to the hospital. She tried some tums when this happened. She did not eat anything out of the ordinary.     She continues to follow the partial low FODMAP diet, no red meat or fried food.     Interval History 3/15/2022:   Raw onion, garlic, super sweet foods but otherwise she feels good. Cough is much better, but she only feels better with symptoms. Occasionally GERD for which she will take tums.     IBS w/ sudden diarreha. No melena or heamtochezia. 2 times a week will have this. Othrewise BID formed and normal. Fiber in diet has been going. She does not drink much water throughout the day.     PROBLEM LIST  Patient Active Problem List    Diagnosis Date Noted     Indication for care in labor or delivery 01/17/2014     Priority: Medium        PERTINENT PAST MEDICAL HISTORY:  Anxiety     PREVIOUS SURGERIES:  Past Surgical History:   Procedure Laterality Date     ESOPHAGOSCOPY, GASTROSCOPY, DUODENOSCOPY (EGD), COMBINED N/A 9/20/2021    Procedure: ESOPHAGOGASTRODUODENOSCOPY (EGD);  Surgeon: Rashad Barber MD;  Location: [facility] OR      TOOTH EXTRACTION W/FORCEP  2002     WISDOM TOOTH EXTRACTION         PREVIOUS ENDOSCOPY:  Impression:          - LA Grade B reflux esophagitis.                        - Normal stomach.                        - Normal examined duodenum.                        - The BRAVO pH capsule was deployed.                        - No specimens collected.      9/20/2021:  Impression:          - Z-line irregular, 37 cm from the incisors. No evidence                        of Mccrary's esophagus.                        - Gastroesophageal flap valve classified as Hill Grade                        II (fold present, opens with respiration).                        - Normal stomach.                        - Normal examined duodenum.                        - No specimens collected.   ALLERGIES:     Allergies   Allergen Reactions     Ibuprofen GI Disturbance       PERTINENT MEDICATIONS:    Current Outpatient Medications:      citalopram (CELEXA) 20 MG tablet, Take 1 tablet (20 mg) by mouth daily, Disp: 90 tablet, Rfl: 2     omeprazole (PRILOSEC) 40 MG DR capsule, Take 1 capsule (40 mg) by mouth daily, Disp: 90 capsule, Rfl: 3     guaiFENesin (ROBITUSSIN) 100 MG/5ML liquid, Take 10 mLs (200 mg) by mouth every 4 hours as needed for cough (Patient not taking: Reported on 3/15/2022), Disp: 240 mL, Rfl: 0     guaiFENesin-codeine (CHERATUSSIN AC) 100-10 MG/5ML solution, Take 5-10 mLs by mouth every 4 hours as needed for cough (Patient not taking: Reported on 3/15/2022), Disp: 240 mL, Rfl: 0     predniSONE (DELTASONE) 20 MG tablet, Take one PO BID x 3 days (Patient not taking: Reported on 3/15/2022), Disp: 6 tablet, Rfl: 0   Avoids NSAIDs      SOCIAL HISTORY:  No alcohol use or drug use   Social History     Socioeconomic History     Marital status:      Spouse name: Not on file     Number of children: Not on file     Years of education: Not on file     Highest education level: Not on file   Occupational History     Not on file   Tobacco Use     Smoking status: Never Smoker     Smokeless tobacco: Never Used   Substance and Sexual Activity     Alcohol use: No     Drug use: No     Sexual activity: Yes     Partners: Male     Birth control/protection: None   Other Topics Concern     Parent/sibling w/ CABG, MI or angioplasty before 65F 55M? Not Asked   Social History Narrative     Not on file     Social Determinants of Health     Financial Resource Strain: Not on file   Food Insecurity: Not on file   Transportation Needs: Not on file   Physical Activity: Not on file   Stress: Not on file   Social Connections: Not on file   Intimate Partner Violence: Not on file   Housing Stability: Not on file       FAMILY HISTORY:  FH of CRC: none   Mother with pre-cancerous polyps   FH of IBD: none   Dad with bad reflux and esophageal cancer   Father's biological family with esophageal cancer and GERD.   He also has a sibling with Mccrary's esophagus     Past/family/social history reviewed and no changes    PHYSICAL EXAMINATION:  General appearance: Healthy appearing adult, in no acute distress  Eyes: Sclera anicteric  Ears, nose, mouth and throat: No obvious external lesions of ears and nose, Hearing intact  Neck: Symmetric, No obvious external lesions  Respiratory: Normal respiration, no use of accessory muscles   Skin: No rashes or jaundice   Psychiatric: Oriented to person, place and time, Appropriate mood and affect.     PERTINENT STUDIES:  No results found for any previous visit.     BRAVO study  -- positive study with possible correlation with throat closing and acid reflux events. No correlation with heartburn and cough, study was truncated as the probe was  dislodged. [place]ter average was 28.3

## 2022-03-15 NOTE — PATIENT INSTRUCTIONS
It was a pleasure taking care of you today.  I've included a brief summary of our discussion and care plan from today's visit below.  Please review this information with your primary care provider.  ______________________________________________________________________    My recommendations are summarized as follows:    --Omeprazole 40 once daily    GERD Lifestyle Modifications  -- Avoid foods high in fat or high fructose corn syrup  -- do not eat within three hours of laying down or going to bed  -- raise the head of your bed 6-8 inches  -- avoid alcohol  -- aim for BMI of less than <25 and lose extra weight as needed    -- start fiber with 1 tablet a day for two weeks. Gradually increase to 4 a day    -- take bentyl as needed for abdominal cramping     -- please see scheduling information provided below     Return to GI Clinic in 9-12 months to review your progress.    ______________________________________________________________________    How do I schedule labs, imaging studies, or procedures that were ordered in clinic today?     Labs: To schedule lab appointment at the [facility], use my chart or call [phone]. If you have a [facility] lab closer to home where you are regularly seen you can give them a call.     Procedures: If a colonoscopy, upper endoscopy, breath test, esophageal manometry, or pH impedence was ordered today, our endoscopy team will call you to schedule this. If you have not heard from our endoscopy team within a week, please call [phone] to schedule.     Imaging Studies: If you were scheduled for a CT scan, X-ray, MRI, ultrasound, HIDA scan or other imaging study, please call [phone] to have this scheduled.     Referral: If a referral to another specialty was ordered, expect a phone call or follow instructions above. If you have not heard from anyone regarding your referral in a week, please call our clinic to check the status.     Who do I call with any  questions after my visit?  Please be in touch if there are any further questions that arise following today's visit.  There are multiple ways to contact your gastroenterology care team.        During business hours, you may reach a Gastroenterology nurse at [phone]      To schedule or reschedule an appointment, please call [phone].       You can always send a secure message through [employer].  [employer] messages are answered by your nurse or doctor typically within 24 hours.  Please allow extra time on weekends and holidays.        For urgent/emergent questions after business hours, you may reach the on-call GI Fellow by contacting the [facility] at [phone].     How will I get the results of any tests ordered?    You will receive all of your results.  If you have signed up for [employer], any tests ordered at your visit will be available to you after your provider reviews them.  Typically this takes 1-2 weeks.  If there are urgent results that require a change in your care plan, your provider or nurse will call you to discuss the next steps.      What is [employer]?  [employer] is a secure way for you to access all of your healthcare records from the [facility].  It is a web based computer program, so you can sign on to it from any location.  It also allows you to send secure messages to your care team.  I recommend signing up for [employer] access if you have not already done so and are comfortable with using a computer.      How to I schedule a follow-up visit?  If you did not schedule a follow-up visit today, please call [phone] to schedule a follow-up office visit.      Sincerely,    Stephanie Hernandez PA-C  Division of Gastroenterology, Hepatology & Nutrition  [facility]

## 2022-03-15 NOTE — LETTER
3/15/2022         RE: Komal Parra  [address] [address]        Dear Colleague,    Thank you for referring your patient, Komal Parra, to the [facility] [place]. Please see a copy of my visit note below.    GI CLINIC VISIT    CC/REFERRING MD:  Referred Self  REASON FOR CONSULTATION: chronic cough    ASSESSMENT/PLAN:  1. GERD, Chronic cough   Patient reports 2 years of chronic cough certain foods including lettuce, certain types of wheat.  Has been seen by ENT and allergy without a source of symptoms.  Does have significant family history of GERD and father and uncles with esophageal cancer.  Bravo off PPI showed positive study with possible correlation with throat closing and acid reflux events. No correlation with heartburn and cough, study was truncated as the probe was dislodged. DeMeester average was 28.3. Though no association was found between GERD and cough, her study demonstrates underlying reflux.     She overall does feel better with omeprazole 40 mg a day, and at 20 mg a day dosing she had daily symptoms and one episode of severe symptoms. Would recommend continuing 40 mg a day. Discussed risks and benefits of PPI.      Patient also reports continued IBS symptoms with loose stools, abdominal cramping. Would recommend fiber supplementation and bentyl as outlined below. Medication discussed.  --Omeprazole 40 once daily    GERD Lifestyle Modifications  -- Avoid foods high in fat or high fructose corn syrup  -- do not eat within three hours of laying down or going to bed  -- raise the head of your bed 6-8 inches  -- avoid alcohol  -- aim for BMI of less than <25 and lose extra weight as needed    -- start fiber with 1 tablet a day for two weeks. Gradually increase to 4 a day    -- take bentyl as needed for abdominal cramping     RTC 9-12 months     Thank you for this consultation.  It was a pleasure to participate in the care of this  "patient; please contact us with any further questions.     26 Minutes was spent on the date of the encounter during chart review, history and exam, documentation, and further activities as noted       Stephanie Hernandez PA-C  Division of Gastroenterology, Hepatology & Nutrition  [facility]        HPI  Komal Parra is a 36 year old presenting for follow-up for chronic cough, GERD, IBS.     Patient reports a chronic cough for 2 years. Symptoms do seem to be getting worse with time.     She has previously been seen by an allergist ([facility] in [place]- gluten, lettuce, preservative allergies were negative) and an ENT, a dietitian also suggested yeast overgrowth. Also notes lettuce seems to be a trigger. She notes that symptoms went away for a while (early April for 2 weeks), but that they came back \"with a vengence\". Symptoms seem to be year round. She also describes sensation of \"lump in her throat\" when she swallows that feels \"mucusy\". She states this is dry cough- and can last for hours after eating. When she coughs, she does not necessarily feal better but she always has this urge to cough. She has been on PPI with pantoprazole which did not help after two months, Flonase did not help. She does get some GERD symptoms related to food triggers. She occasionally will have dysphagia to solid foods if she does not chew properly. No dysphagia to liquids.  Foods that are high in wheat seem to trigger symptoms. She reports that she can typically tolerate white bread, but what bread can make things worse.     She notes that her dad had really severe GERD and  of esophageal cancer, her sister had severe GERD as well but this improved.     She does note SOB- and feels like she cannot breathe. She will try taking an inhaler- does not make much of a difference. She is able to run without coughing, but with other types of workouts can trigger symptoms.     Notes IBS- cannot eat fried foods, has " had this for 20 years. In the beginning of April she was constipated, and she was not coughing during this time. Can be up to four times a day. Will have urgency with bowel movements.     Interval History 9/14/2021:   Was switched from pantoprazole to omeprazole. She took 40 mg BID for one month, then reduced to once daily for a month. Overall is doing well with this.     Has been doing the low FODMAP diet which she has found helpful. Things that are high in sugar do seem to agitate her cough, and irritates her IBS. She is taking omeprazole at bedtime    Interval History 11/10/2021:  After the patient's last visit, she had EGD which showed healing of her grade B esophagitis.     She is now taking omeprazole 20 mg daily- she feels GERD once a day. She does report an episode of chest pain that did not feel like traditional GERD (cramping feeling that came and went) and she considered going to the hospital. She tried some tums when this happened. She did not eat anything out of the ordinary.     She continues to follow the partial low FODMAP diet, no red meat or fried food.     Interval History 3/15/2022:   Raw onion, garlic, super sweet foods but otherwise she feels good. Cough is much better, but she only feels better with symptoms. Occasionally GERD for which she will take tums.     IBS w/ sudden diarreha. No melena or heamtochezia. 2 times a week will have this. Othrewise BID formed and normal. Fiber in diet has been going. She does not drink much water throughout the day.     PROBLEM LIST  Patient Active Problem List    Diagnosis Date Noted     Indication for care in labor or delivery 01/17/2014     Priority: Medium       PERTINENT PAST MEDICAL HISTORY:  Anxiety     PREVIOUS SURGERIES:  Past Surgical History:   Procedure Laterality Date     ESOPHAGOSCOPY, GASTROSCOPY, DUODENOSCOPY (EGD), COMBINED N/A 9/20/2021    Procedure: ESOPHAGOGASTRODUODENOSCOPY (EGD);  Surgeon: Rashad Barber MD;  [place] [facility] [place]     [place] TOOTH EXTRACTION W/FORCEP  2002     WISDOM TOOTH EXTRACTION         PREVIOUS ENDOSCOPY:  Impression:          - LA Grade B reflux esophagitis.                        - Normal stomach.                        - Normal examined duodenum.                        - The BRAVO pH capsule was deployed.                        - No specimens collected.      9/20/2021:  Impression:          - Z-line irregular, 37 cm from the incisors. No evidence                        of Mccrary's esophagus.                        - Gastroesophageal flap valve classified as Hill Grade                        II (fold present, opens with respiration).                        - Normal stomach.                        - Normal examined duodenum.                        - No specimens collected.   ALLERGIES:     Allergies   Allergen Reactions     Ibuprofen GI Disturbance       PERTINENT MEDICATIONS:    Current Outpatient Medications:      citalopram (CELEXA) 20 MG tablet, Take 1 tablet (20 mg) by mouth daily, Disp: 90 tablet, Rfl: 2     omeprazole (PRILOSEC) 40 MG DR capsule, Take 1 capsule (40 mg) by mouth daily, Disp: 90 capsule, Rfl: 3     guaiFENesin (ROBITUSSIN) 100 MG/5ML liquid, Take 10 mLs (200 mg) by mouth every 4 hours as needed for cough (Patient not taking: Reported on 3/15/2022), Disp: 240 mL, Rfl: 0     guaiFENesin-codeine (CHERATUSSIN AC) 100-10 MG/5ML solution, Take 5-10 mLs by mouth every 4 hours as needed for cough (Patient not taking: Reported on 3/15/2022), Disp: 240 mL, Rfl: 0     predniSONE (DELTASONE) 20 MG tablet, Take one PO BID x 3 days (Patient not taking: Reported on 3/15/2022), Disp: 6 tablet, Rfl: 0   Avoids NSAIDs     SOCIAL HISTORY:  No alcohol use or drug use   Social History     Socioeconomic History     Marital status:      Spouse name: Not on file     Number of children: Not on file     Years of education: Not on file     Highest education level: Not on file   Occupational History     Not on file    Tobacco Use     Smoking status: Never Smoker     Smokeless tobacco: Never Used   Substance and Sexual Activity     Alcohol use: No     Drug use: No     Sexual activity: Yes     Partners: Male     Birth control/protection: None   Other Topics Concern     Parent/sibling w/ CABG, MI or angioplasty before 65F 55M? Not Asked   Social History Narrative     Not on file     Social Determinants of Health     Financial Resource Strain: Not on file   Food Insecurity: Not on file   Transportation Needs: Not on file   Physical Activity: Not on file   Stress: Not on file   Social Connections: Not on file   Intimate Partner Violence: Not on file   Housing Stability: Not on file       FAMILY HISTORY:  FH of CRC: none   Mother with pre-cancerous polyps   FH of IBD: none   Dad with bad reflux and esophageal cancer   Father's biological family with esophageal cancer and GERD.   He also has a sibling with Mccrary's esophagus     Past/family/social history reviewed and no changes    PHYSICAL EXAMINATION:  General appearance: Healthy appearing adult, in no acute distress  Eyes: Sclera anicteric  Ears, nose, mouth and throat: No obvious external lesions of ears and nose, Hearing intact  Neck: Symmetric, No obvious external lesions  Respiratory: Normal respiration, no use of accessory muscles   Skin: No rashes or jaundice   Psychiatric: Oriented to person, place and time, Appropriate mood and affect.     PERTINENT STUDIES:  No results found for any previous visit.     BRAVO study  -- positive study with possible correlation with throat closing and acid reflux events. No correlation with heartburn and cough, study was truncated as the probe was dislodged. DeMeester average was 28.3         Stephanie Hernandez PA-C

## 2022-05-15 ENCOUNTER — HEALTH MAINTENANCE LETTER (OUTPATIENT)
Age: 37
End: 2022-05-15

## 2022-08-09 ENCOUNTER — TELEPHONE (OUTPATIENT)
Dept: FAMILY MEDICINE | Facility: CLINIC | Age: 37
End: 2022-08-09

## 2022-08-09 NOTE — TELEPHONE ENCOUNTER
Reason for Call:  Same Day Appointment, Requested Provider:  anyone    PCP: Dorota Guzman    Reason for visit: Sinus infection gone to lungs for 12 days     Duration of symptoms: 12 days     Have you been treated for this in the past? No    Additional comments: needs sooner than September     Can we leave a detailed message on this number? YES    Phone number patient can be reached at: Home number on file [phone] (home)    Best Time: anytime    Call taken on 8/9/2022 at 8:23 AM by Ashley Arce

## 2022-08-09 NOTE — TELEPHONE ENCOUNTER
Patient wanted to be seen today. There are no openings today.   Patient is going to go to [facility] [place] [facility].

## 2022-09-10 ENCOUNTER — HEALTH MAINTENANCE LETTER (OUTPATIENT)
Age: 37
End: 2022-09-10

## 2022-11-18 ENCOUNTER — MYC MEDICAL ADVICE (OUTPATIENT)
Dept: GASTROENTEROLOGY | Facility: CLINIC | Age: 37
End: 2022-11-18

## 2022-11-29 NOTE — TELEPHONE ENCOUNTER
Called to remind patient of their upcoming appointment with our GI clinic, on Wednesday 11/30/2022 at 9:00AM with Dr. Ordonez. This appointment is scheduled as a video visit. You will receive a call approximately 30 minutes prior to check you in, you must be in [place] for this visit., if your appointment is virtual (video or telephone) you need to be in [place] for the visit. To reschedule or cancel patient to call [phone].    Merlyn Guzmán MA

## 2022-11-30 ENCOUNTER — VIRTUAL VISIT (OUTPATIENT)
Dept: GASTROENTEROLOGY | Facility: CLINIC | Age: 37
End: 2022-11-30
Payer: COMMERCIAL

## 2022-11-30 DIAGNOSIS — K21.9 GASTROESOPHAGEAL REFLUX DISEASE WITHOUT ESOPHAGITIS: Primary | ICD-10-CM

## 2022-11-30 PROCEDURE — 99203 OFFICE O/P NEW LOW 30 MIN: CPT | Mod: GT | Performed by: STUDENT IN AN ORGANIZED HEALTH CARE EDUCATION/TRAINING PROGRAM

## 2022-11-30 NOTE — PROGRESS NOTES
Komal is a 37 year old who is being evaluated via a billable video visit.      How would you like to obtain your AVS? MyChart  If the video visit is dropped, the invitation should be resent by: Send to e-mail at: [email or web address]  Will anyone else be joining your video visit? No        Video-Visit Details    Video Start Time: 9am    Type of service:  Video Visit    Video End Time:9:40    Originating Location (pt. Location): Home        Distant Location (provider location):  Off-site    Platform used for Video Visit: [facility]           GI CLINIC VISIT    CC/REFERRING MD:  Referred Self  REASON FOR CONSULTATION:   Referred Self for   Chief Complaint   Patient presents with     Follow Up       ASSESSMENT/PLAN:  GERD  Extra esophageal manifestation: Cough     Patient with a family history of Mccrary and esophageal cancer who has been following up with GI for management of extraesophageal manifestation of GERD.  Previously evaluated by ENT and allergy team without an identified etiology.   GERD evaluation includes EGD in 5/21 with LA grade B esophagitis now healed on follow up EGD September of the same year.     Patient also had PH testing(Bravo) off PPI which showed  positive study with possible correlation with throat closing and acid reflux events. No correlation with heartburn and cough, study was truncated as the probe was dislodged. DeMeester average was 28.3. Though no association was found between GERD and cough, her study demonstrates underlying reflux.    Today, patient reports controlled daytime symptoms on current regimen of Prilosec 40mg in the morning (takes about 20 minutes before morning coffee), she unfortunately continues to report night times symptoms ( mostly cough) and also symptoms if she eats spicy or flavored foods.     She otherwise states she is feeling well today no alarm symptoms, weight loss, dysphagia, abdominal pain, nausea/vomiting or evidence of overt GI bleeding.     Plan:   -Ccontinue  your Prilosec 40 mg in the morning ( please ensure you take it on an empty stomach 30-60 minutes before breakfast)   --Add Prilosec 20 mg at night given ongoing night time symptoms ( please ensure you take on an empty stomach 30-60 minutes before dinner)  --Continue to avoid trigger foods as you are already doing  --Continue lifestyle modification   --We are comfortable with you following up with your primary care physician from here, please feel free to reach out to us with questions or concerns.       RTC: Return if symptoms worsen or fail to improve.     Assessment and plan discussed with GI attending Dr. Barnes who is in agreement    40 minutes spent on the date of the encounter performing chart review, history and exam, documentation and further activities as noted above.  .      Hollis Ordonez MD  Gastroenterology Fellow  Division of Gastroenterology, Hepatology and Nutrition  [facility]  P:         HPI   37 year old patient with a family history of Mccrary and esophageal cancer in her father presents tpday for follow up of her ongoing GERD with predominantly extra esophageal manifestation of cough now s/p EGD in May 2021 with evidence of LA grade B esophagitis with follow up EGD in sept of that year showing healed esophagitis. Patient has also had [facility] testing off PPI with evidence of acid reflux and has been managed with Prilosec 40mg in the morning.     Patient reports controlled daytime symptoms on current regimen of Prilosec 40mg in the morning (takes about 20 minutes before morning coffee), she unfortunately continues to report night times symptoms ( mostly cough) and also symptoms if she eats spicy or flavored foods.     She otherwise states she is feeling well today no alarm symptoms, weight loss, dysphagia, abdominal pain, nausea/vomiting or evidence of overt GI bleeding.     ROS:    No fevers or chills  No weight loss  No blurry vision, double vision or change in vision  No  sore throat  No lymphadenopathy  No headache, paraesthesias, or weakness in a limb  No chest pain or pressure  No arthralgias or myalgias  No rashes or skin changes  No odynophagia or dysphagia  No BRBPR, hematochezia, melena  No dysuria, frequency or urgency  No hot/cold intolerance or polyria  No anxiety or depression    PROBLEM LIST  Patient Active Problem List    Diagnosis Date Noted     Indication for care in labor or delivery 01/17/2014     Priority: Medium       PERTINENT PAST MEDICAL HISTORY:  Past Medical History:   Diagnosis Date     KELLEY (generalized anxiety disorder)     Celexa 20 mg daily     IBS (irritable bowel syndrome)        PREVIOUS SURGERIES:  Past Surgical History:   Procedure Laterality Date     ESOPHAGOSCOPY, GASTROSCOPY, DUODENOSCOPY (EGD), COMBINED N/A 9/20/2021    Procedure: ESOPHAGOGASTRODUODENOSCOPY (EGD);  Surgeon: Rashad Barber MD;  [place] [facility] [place]      TOOTH EXTRACTION W/FORCEP  2002     WISDOM TOOTH EXTRACTION         PREVIOUS ENDOSCOPY:  EGD 5/2021  LA Grade B esophagitis     EGD Sept 2021  Hill grade 2, otherwise normal     ALLERGIES:     Allergies   Allergen Reactions     Ibuprofen GI Disturbance       PERTINENT MEDICATIONS:    Current Outpatient Medications:      citalopram (CELEXA) 20 MG tablet, TAKE 1 TABLET BY MOUTH EVERY DAY, Disp: 90 tablet, Rfl: 0     dicyclomine (BENTYL) 10 MG capsule, Take 1 capsule (10 mg) by mouth 4 times daily as needed (abdominal cramping), Disp: 60 capsule, Rfl: 3     guaiFENesin (ROBITUSSIN) 100 MG/5ML liquid, Take 10 mLs (200 mg) by mouth every 4 hours as needed for cough (Patient not taking: Reported on 3/15/2022), Disp: 240 mL, Rfl: 0     guaiFENesin-codeine (CHERATUSSIN AC) 100-10 MG/5ML solution, Take 5-10 mLs by mouth every 4 hours as needed for cough (Patient not taking: Reported on 3/15/2022), Disp: 240 mL, Rfl: 0     omeprazole (PRILOSEC) 40 MG DR capsule, Take 1 capsule (40 mg) by mouth daily, Disp: 90 capsule, Rfl: 3      predniSONE (DELTASONE) 20 MG tablet, Take one PO BID x 3 days (Patient not taking: Reported on 3/15/2022), Disp: 6 tablet, Rfl: 0    SOCIAL HISTORY:  Social History     Socioeconomic History     Marital status:      Spouse name: Not on file     Number of children: Not on file     Years of education: Not on file     Highest education level: Not on file   Occupational History     Not on file   Tobacco Use     Smoking status: Never     Smokeless tobacco: Never   Substance and Sexual Activity     Alcohol use: No     Drug use: No     Sexual activity: Yes     Partners: Male     Birth control/protection: None   Other Topics Concern     Parent/sibling w/ CABG, MI or angioplasty before 65F 55M? Not Asked   Social History Narrative     Not on file     Social Determinants of Health     Financial Resource Strain: Not on file   Food Insecurity: Not on file   Transportation Needs: Not on file   Physical Activity: Not on file   Stress: Not on file   Social Connections: Not on file   Intimate Partner Violence: Not on file   Housing Stability: Not on file       FAMILY HISTORY:  Family History   Problem Relation Age of Onset     No Known Problems Mother      Esophageal Cancer Father      Lung Cancer Father      Cancer Father      Ovarian Cancer Maternal Grandmother 30.00     Cervical Cancer Maternal Grandmother      Rheumatic fever Maternal Grandmother      Heart Disease Maternal Grandmother      Brain Cancer Maternal Grandfather      Breast Cancer Other      Asthma Son      Anxiety Disorder Son        Past/family/social history reviewed and no changes    PHYSICAL EXAMINATION:  Constitutional: aaox3, cooperative, pleasant, not dyspneic/diaphoretic, no acute distress  Vitals reviewed: There were no vitals taken for this visit.  Wt:   Wt Readings from Last 2 Encounters:   01/26/22 94.7 kg (208 lb 12.8 oz)   11/09/21 86.2 kg (190 lb)      This was a virtual visit, I did not examine the patient.      PERTINENT STUDIES:    Office  Visit on 01/26/2022   Component Date Value Ref Range Status     Influenza A antigen 01/26/2022 Negative  Negative Final     Influenza B antigen 01/26/2022 Negative  Negative Final     SARS CoV2 PCR 01/26/2022 Positive (A)  Negative, Testing sent to reference lab. Results will be returned via unsolicited result Final     Group A Strep antigen 01/26/2022 Negative  Negative Final     Group A strep by PCR 01/26/2022 Not Detected  Not Detected Final

## 2022-11-30 NOTE — PATIENT INSTRUCTIONS
Clara Sauceda,     It was so nice to meet you today, thank you for visiting with us. As discussed during your visit,     --Please continue your Prilosec 40 mg in the morning ( please ensure you take it on an empty stomach 30-60 minutes before breakfast)   --Add Prilosec 20 mg at night given ongoing night time symptoms ( please ensure you take on an empty stomach 30-60 minutes before dinner)  --Continue to avoid trigger foods as you are already doing  --Continue lifestyle modification   --We are comfortable with you following up with your primary care physician from here, please feel free to reach out to us with questions or concerns.     Thank you so much again for your visit today. Be well and stay well!    ~Hollis Ordonez  Division of Gastroenterology

## 2022-11-30 NOTE — LETTER
11/30/2022         RE: Komal Parra  [address] [facility] [address]        Dear Colleague,    Thank you for referring your patient, Komal Parra, to the [facility] [place]. Please see a copy of my visit note below.      GI CLINIC VISIT    CC/REFERRING MD:  Referred Self  REASON FOR CONSULTATION:   Referred Self for   Chief Complaint   Patient presents with     Follow Up       ASSESSMENT/PLAN:  GERD  Extra esophageal manifestation: Cough     Patient with a family history of Mccrary and esophageal cancer who has been following up with GI for management of extraesophageal manifestation of GERD.  Previously evaluated by ENT and allergy team without an identified etiology.   GERD evaluation includes EGD in 5/21 with LA grade B esophagitis now healed on follow up EGD September of the same year.     Patient also had PH testing([facility]) off PPI which showed  positive study with possible correlation with throat closing and acid reflux events. No correlation with heartburn and cough, study was truncated as the probe was dislodged. [place] average was 28.3. Though no association was found between GERD and cough, her study demonstrates underlying reflux.    Today, patient reports controlled daytime symptoms on current regimen of Prilosec 40mg in the morning (takes about 20 minutes before morning coffee), she unfortunately continues to report night times symptoms ( mostly cough) and also symptoms if she eats spicy or flavored foods.     She otherwise states she is feeling well today no alarm symptoms, weight loss, dysphagia, abdominal pain, nausea/vomiting or evidence of overt GI bleeding.     Plan:   -Ccontinue your Prilosec 40 mg in the morning ( please ensure you take it on an empty stomach 30-60 minutes before breakfast)   --Add Prilosec 20 mg at night given ongoing night time symptoms ( please ensure you take on an empty stomach 30-60 minutes before  dinner)  --Continue to avoid trigger foods as you are already doing  --Continue lifestyle modification   --We are comfortable with you following up with your primary care physician from here, please feel free to reach out to us with questions or concerns.       RTC: Return if symptoms worsen or fail to improve.     Assessment and plan discussed with GI attending Dr. Barnes who is in agreement    40 minutes spent on the date of the encounter performing chart review, history and exam, documentation and further activities as noted above.  .      Hollis Ordonez MD  Gastroenterology Fellow  Division of Gastroenterology, Hepatology and Nutrition  [facility]  P:         HPI   37 year old patient with a family history of Mccrary and esophageal cancer in her father presents tpday for follow up of her ongoing GERD with predominantly extra esophageal manifestation of cough now s/p EGD in May 2021 with evidence of LA grade B esophagitis with follow up EGD in sept of that year showing healed esophagitis. Patient has also had [facility] testing off PPI with evidence of acid reflux and has been managed with Prilosec 40mg in the morning.     Patient reports controlled daytime symptoms on current regimen of Prilosec 40mg in the morning (takes about 20 minutes before morning coffee), she unfortunately continues to report night times symptoms ( mostly cough) and also symptoms if she eats spicy or flavored foods.     She otherwise states she is feeling well today no alarm symptoms, weight loss, dysphagia, abdominal pain, nausea/vomiting or evidence of overt GI bleeding.     ROS:    No fevers or chills  No weight loss  No blurry vision, double vision or change in vision  No sore throat  No lymphadenopathy  No headache, paraesthesias, or weakness in a limb  No chest pain or pressure  No arthralgias or myalgias  No rashes or skin changes  No odynophagia or dysphagia  No BRBPR, hematochezia, melena  No dysuria,  frequency or urgency  No hot/cold intolerance or polyria  No anxiety or depression    PROBLEM LIST  Patient Active Problem List    Diagnosis Date Noted     Indication for care in labor or delivery 01/17/2014     Priority: Medium       PERTINENT PAST MEDICAL HISTORY:  Past Medical History:   Diagnosis Date     KELLEY (generalized anxiety disorder)     Celexa 20 mg daily     IBS (irritable bowel syndrome)        PREVIOUS SURGERIES:  Past Surgical History:   Procedure Laterality Date     ESOPHAGOSCOPY, GASTROSCOPY, DUODENOSCOPY (EGD), COMBINED N/A 9/20/2021    Procedure: ESOPHAGOGASTRODUODENOSCOPY (EGD);  Surgeon: Rashad Barber MD;  [place] [facility] [place]      TOOTH EXTRACTION W/FORCEP  2002     WISDOM TOOTH EXTRACTION         PREVIOUS ENDOSCOPY:  EGD 5/2021  LA Grade B esophagitis     EGD Sept 2021  Hill grade 2, otherwise normal     ALLERGIES:     Allergies   Allergen Reactions     Ibuprofen GI Disturbance       PERTINENT MEDICATIONS:    Current Outpatient Medications:      citalopram (CELEXA) 20 MG tablet, TAKE 1 TABLET BY MOUTH EVERY DAY, Disp: 90 tablet, Rfl: 0     dicyclomine (BENTYL) 10 MG capsule, Take 1 capsule (10 mg) by mouth 4 times daily as needed (abdominal cramping), Disp: 60 capsule, Rfl: 3     guaiFENesin (ROBITUSSIN) 100 MG/5ML liquid, Take 10 mLs (200 mg) by mouth every 4 hours as needed for cough (Patient not taking: Reported on 3/15/2022), Disp: 240 mL, Rfl: 0     guaiFENesin-codeine (CHERATUSSIN AC) 100-10 MG/5ML solution, Take 5-10 mLs by mouth every 4 hours as needed for cough (Patient not taking: Reported on 3/15/2022), Disp: 240 mL, Rfl: 0     omeprazole (PRILOSEC) 40 MG DR capsule, Take 1 capsule (40 mg) by mouth daily, Disp: 90 capsule, Rfl: 3     predniSONE (DELTASONE) 20 MG tablet, Take one PO BID x 3 days (Patient not taking: Reported on 3/15/2022), Disp: 6 tablet, Rfl: 0    SOCIAL HISTORY:  Social History     Socioeconomic History     Marital status:      Spouse name: Not on  file     Number of children: Not on file     Years of education: Not on file     Highest education level: Not on file   Occupational History     Not on file   Tobacco Use     Smoking status: Never     Smokeless tobacco: Never   Substance and Sexual Activity     Alcohol use: No     Drug use: No     Sexual activity: Yes     Partners: Male     Birth control/protection: None   Other Topics Concern     Parent/sibling w/ CABG, MI or angioplasty before 65F 55M? Not Asked   Social History Narrative     Not on file     Social Determinants of Health     Financial Resource Strain: Not on file   Food Insecurity: Not on file   Transportation Needs: Not on file   Physical Activity: Not on file   Stress: Not on file   Social Connections: Not on file   Intimate Partner Violence: Not on file   Housing Stability: Not on file       FAMILY HISTORY:  Family History   Problem Relation Age of Onset     No Known Problems Mother      Esophageal Cancer Father      Lung Cancer Father      Cancer Father      Ovarian Cancer Maternal Grandmother 30.00     Cervical Cancer Maternal Grandmother      Rheumatic fever Maternal Grandmother      Heart Disease Maternal Grandmother      Brain Cancer Maternal Grandfather      Breast Cancer Other      Asthma Son      Anxiety Disorder Son        Past/family/social history reviewed and no changes    PHYSICAL EXAMINATION:  Constitutional: aaox3, cooperative, pleasant, not dyspneic/diaphoretic, no acute distress  Vitals reviewed: There were no vitals taken for this visit.  Wt:   Wt Readings from Last 2 Encounters:   01/26/22 94.7 kg (208 lb 12.8 oz)   11/09/21 86.2 kg (190 lb)      This was a virtual visit, I did not examine the patient.      PERTINENT STUDIES:    Office Visit on 01/26/2022   Component Date Value Ref Range Status     Influenza A antigen 01/26/2022 Negative  Negative Final     Influenza B antigen 01/26/2022 Negative  Negative Final     SARS CoV2 PCR 01/26/2022 Positive (A)  Negative, Testing  sent to reference lab. Results will be returned via unsolicited result Final     Group A Strep antigen 01/26/2022 Negative  Negative Final     Group A strep by PCR 01/26/2022 Not Detected  Not Detected Final               Sincerely,    Hollis Ordonez MD

## 2023-01-03 ENCOUNTER — OFFICE VISIT (OUTPATIENT)
Dept: FAMILY MEDICINE | Facility: CLINIC | Age: 38
End: 2023-01-03
Payer: COMMERCIAL

## 2023-01-03 VITALS
HEART RATE: 63 BPM | OXYGEN SATURATION: 99 % | DIASTOLIC BLOOD PRESSURE: 60 MMHG | BODY MASS INDEX: 32.95 KG/M2 | SYSTOLIC BLOOD PRESSURE: 107 MMHG | HEIGHT: 67 IN

## 2023-01-03 DIAGNOSIS — R22.40 MASS OF SHIN: ICD-10-CM

## 2023-01-03 DIAGNOSIS — F41.9 ANXIETY: Primary | ICD-10-CM

## 2023-01-03 DIAGNOSIS — G57.01 PIRIFORMIS SYNDROME, RIGHT: ICD-10-CM

## 2023-01-03 DIAGNOSIS — K21.9 GASTROESOPHAGEAL REFLUX DISEASE WITHOUT ESOPHAGITIS: ICD-10-CM

## 2023-01-03 PROCEDURE — 91312 COVID-19 VACCINE BIVALENT BOOSTER 12+ (PFIZER): CPT | Performed by: FAMILY MEDICINE

## 2023-01-03 PROCEDURE — 0124A COVID-19 VACCINE BIVALENT BOOSTER 12+ (PFIZER): CPT | Performed by: FAMILY MEDICINE

## 2023-01-03 PROCEDURE — 99214 OFFICE O/P EST MOD 30 MIN: CPT | Performed by: FAMILY MEDICINE

## 2023-01-03 RX ORDER — FAMOTIDINE 20 MG
TABLET ORAL
COMMUNITY

## 2023-01-03 RX ORDER — FAMOTIDINE 20 MG/1
20 TABLET, FILM COATED ORAL 2 TIMES DAILY PRN
Qty: 90 TABLET | Refills: 1 | Status: SHIPPED | OUTPATIENT
Start: 2023-01-03 | End: 2024-01-18

## 2023-01-03 RX ORDER — CYCLOBENZAPRINE HCL 5 MG
5 TABLET ORAL 2 TIMES DAILY PRN
Qty: 20 TABLET | Refills: 0 | Status: SHIPPED | OUTPATIENT
Start: 2023-01-03 | End: 2023-09-13

## 2023-01-03 RX ORDER — CITALOPRAM HYDROBROMIDE 20 MG/1
20 TABLET ORAL DAILY
Qty: 90 TABLET | Refills: 3 | Status: SHIPPED | OUTPATIENT
Start: 2023-01-03 | End: 2024-02-21

## 2023-01-03 NOTE — PROGRESS NOTES
Assessment & Plan     Anxiety  Symptoms well controlled on Celexa, refills provided.  - citalopram (CELEXA) 20 MG tablet  Dispense: 90 tablet; Refill: 3    Gastroesophageal reflux disease without esophagitis  Currently taking omeprazole 40 mg daily but continues to have nighttime symptoms.  Previously followed with GI for GERD symptoms that affect her breathing, particularly when lying down at night.  Reviewed last EGD from 9/2021 which was normal.  Strong family history of Mccrary's esophagus, so consider repeat EGD 9/2023 if symptoms remain uncontrolled.  Recommend changing omeprazole to 20 mg twice daily and using famotidine as needed breakthrough symptoms.  - omeprazole (PRILOSEC) 20 MG DR capsule  Dispense: 180 capsule; Refill: 3  - famotidine (PEPCID) 20 MG tablet  Dispense: 90 tablet; Refill: 1    Piriformis syndrome, right  Suspect due to increased physical activity/strength training.  Poor tolerance to NSAIDs, so we will offer Flexeril for muscle spasm.  Also discussed ice/heat and routine exercises/stretching which were provided to her.  - cyclobenzaprine (FLEXERIL) 5 MG tablet  Dispense: 20 tablet; Refill: 0    Mass of shin  Cystlike structure medial anterior right shin.  Nontender no signs of infection.  If changes or grows, will pursue ultrasound.     Dorota Guzman [facility] is a 37 year old, presenting for the following health issues:  Recheck Medication and Hip Pain (Glut and leg hurts sometimes into leg, right side)      History of Present Illness       Reason for visit:  Well check    She eats 2-3 servings of fruits and vegetables daily.She consumes 1 sweetened beverage(s) daily.She exercises with enough effort to increase her heart rate 30 to 60 minutes per day.  She exercises with enough effort to increase her heart rate 4 days per week.   She is taking medications regularly.     Mood is well controlled on celexa. Needs refill.    Had  "previously followed with GI. Reflux symptoms affect breathing. Taking omeprazole in the morning. Still having nightime symptoms when laying down. Onion and garlic can worsen symptoms. Heavier carbs also contribute.  Strong family history of Mccrary's esophagus.  Last EGD 9/2021.  No longer needed to follow with GI.    Increased intensity of her workouts, particularly weight training.  Now has pain in right buttock.  Feels a ropey muscle that feels better with massage.  Occasionally had sharp pain go down posterior thigh and once to the back of the ankle.  Feels fine with running but becomes stiff and painful at the end of the day.  Tries to avoid NSAIDs.    Syndrome is a mass on her right medial anterior shin.  Nontender.  Unsure if it is grown in size.      Objective    /60   Pulse 63   Ht 1.695 m (5' 6.75\")   SpO2 99%   BMI 32.95 kg/m    Body mass index is 32.95 kg/m .  Physical Exam   GENERAL: healthy, alert and no distress  RESP: lungs clear to auscultation - no rales, rhonchi or wheezes  CV: regular rate and rhythm, normal S1 S2, no S3 or S4, no murmur, click or rub, no peripheral edema and peripheral pulses strong  MS: Tender to palpation along right piriformis muscle, negative straight leg raise bilaterally. 1.5 x2 cm nontender mass right anterior shin  PSYCH: mentation appears normal, affect normal/bright        "

## 2023-01-04 ENCOUNTER — TELEPHONE (OUTPATIENT)
Dept: FAMILY MEDICINE | Facility: CLINIC | Age: 38
End: 2023-01-04

## 2023-01-06 NOTE — TELEPHONE ENCOUNTER
Central Prior Authorization Team  Phone: [phone]    PA Initiation    Medication: omeprazole (PRILOSEC) 20 MG DR capsule  Insurance Company: [employer] ([facility]) - Phone [phone] Fax [phone]  Pharmacy Filling the Rx: [id number] [place] [facility] [place] - [address]  Filling Pharmacy Phone: [phone]  Filling Pharmacy Fax:    Start Date: 1/6/2023

## 2023-01-10 NOTE — TELEPHONE ENCOUNTER
Central Prior Authorization Team  Phone: [phone]    Prior Authorization Approval    Authorization Effective Date: 1/6/2023  Authorization Expiration Date: 1/6/2024  Medication: omeprazole (PRILOSEC) 20 MG DR capsule  Approved Dose/Quantity:   Reference #:     Insurance Company: [employer] ([facility]) - Phone [phone] Fax [phone]  Expected CoPay:       CoPay Card Available:      Foundation Assistance Needed:    Which Pharmacy is filling the prescription (Not needed for infusion/clinic administered): [id number] [place] [address] [facility], [facility]  Pharmacy Notified:    Patient Notified:

## 2023-06-03 ENCOUNTER — HEALTH MAINTENANCE LETTER (OUTPATIENT)
Age: 38
End: 2023-06-03

## 2023-08-20 ENCOUNTER — NURSE TRIAGE (OUTPATIENT)
Dept: NURSING | Facility: CLINIC | Age: 38
End: 2023-08-20
Payer: COMMERCIAL

## 2023-08-20 ENCOUNTER — HOSPITAL ENCOUNTER (EMERGENCY)
Facility: HOSPITAL | Age: 38
Discharge: HOME | End: 2023-08-20
Attending: FAMILY MEDICINE
Payer: COMMERCIAL

## 2023-08-20 VITALS
HEART RATE: 58 BPM | DIASTOLIC BLOOD PRESSURE: 74 MMHG | WEIGHT: 185 LBS | HEIGHT: 67 IN | TEMPERATURE: 97.8 F | RESPIRATION RATE: 20 BRPM | BODY MASS INDEX: 29.03 KG/M2 | SYSTOLIC BLOOD PRESSURE: 112 MMHG | OXYGEN SATURATION: 94 %

## 2023-08-20 DIAGNOSIS — T63.461A ALLERGIC REACTION TO WASP STING: Primary | ICD-10-CM

## 2023-08-20 PROBLEM — K21.9 GERD (GASTROESOPHAGEAL REFLUX DISEASE): Status: ACTIVE | Noted: 2023-08-20

## 2023-08-20 PROBLEM — Z97.5 IUD (INTRAUTERINE DEVICE) IN PLACE: Status: ACTIVE | Noted: 2023-08-20

## 2023-08-20 PROCEDURE — 99284 EMERGENCY DEPT VISIT MOD MDM: CPT | Performed by: FAMILY MEDICINE

## 2023-08-20 PROCEDURE — 6370000100 HC RX 637 (ALT 250 FOR IP): Performed by: FAMILY MEDICINE

## 2023-08-20 PROCEDURE — 96374 THER/PROPH/DIAG INJ IV PUSH: CPT

## 2023-08-20 PROCEDURE — 99283 EMERGENCY DEPT VISIT LOW MDM: CPT | Performed by: FAMILY MEDICINE

## 2023-08-20 PROCEDURE — 6360000200 HC RX 636 W HCPCS (ALT 250 FOR IP): Performed by: FAMILY MEDICINE

## 2023-08-20 RX ORDER — OMEPRAZOLE 20 MG/1
20 CAPSULE, DELAYED RELEASE ORAL DAILY
COMMUNITY

## 2023-08-20 RX ORDER — CITALOPRAM 20 MG/1
20 TABLET, FILM COATED ORAL DAILY
COMMUNITY

## 2023-08-20 RX ORDER — TRIAMCINOLONE ACETONIDE 1 MG/G
1 CREAM TOPICAL ONCE
Status: COMPLETED | OUTPATIENT
Start: 2023-08-20 | End: 2023-08-20

## 2023-08-20 RX ORDER — LORATADINE 10 MG/1
10 TABLET ORAL ONCE
Status: COMPLETED | OUTPATIENT
Start: 2023-08-20 | End: 2023-08-20

## 2023-08-20 RX ORDER — EPINEPHRINE 0.3 MG/.3ML
1 INJECTION SUBCUTANEOUS ONCE
Qty: 0.3 ML | Refills: 0 | Status: SHIPPED | OUTPATIENT
Start: 2023-08-20 | End: 2023-08-20

## 2023-08-20 RX ORDER — PREDNISONE 10 MG/1
1 TABLET ORAL ONCE
Status: COMPLETED | OUTPATIENT
Start: 2023-08-20 | End: 2023-08-20

## 2023-08-20 RX ORDER — CETIRIZINE HYDROCHLORIDE 1 MG/ML
10 SOLUTION ORAL ONCE
Status: DISCONTINUED | OUTPATIENT
Start: 2023-08-20 | End: 2023-08-20

## 2023-08-20 RX ADMIN — PREDNISONE 1 PACKAGE: 10 TABLET ORAL at 10:45

## 2023-08-20 RX ADMIN — LORATADINE 10 MG: 10 TABLET ORAL at 10:26

## 2023-08-20 RX ADMIN — METHYLPREDNISOLONE SODIUM SUCCINATE 80 MG: 125 INJECTION, POWDER, FOR SOLUTION INTRAMUSCULAR; INTRAVENOUS at 10:07

## 2023-08-20 RX ADMIN — TRIAMCINOLONE ACETONIDE 1 APPLICATION: 1 CREAM TOPICAL at 11:03

## 2023-08-20 NOTE — DISCHARGE INSTRUCTIONS
See handout on bee wasp or hornet stings.    We gave you steroid through the IV here in the ER.  We also sent prednisone tablets home with you.  You will start with those tonight.  3 of the 10 mg tablets.  You will take 3 of the 10 mg tablets twice daily for 3 to 5 days.  You can stop after 3 days if it seems all symptoms have resolved.    In addition you will want to take Zyrtec 10 mg daily.  You had a dose of a 24-hour antihistamine here in the ER.  We gave you loratadine as we did not have any Zyrtec.  Zyrtec is a little more effective.    In addition for at least the first 24 to 48 hours use 25 mg Benadryl every 6 hours.

## 2023-08-20 NOTE — ED PROVIDER NOTES
CC:  Chief Complaint   Patient presents with    Insect Bite     Reports wasp stung her about 30 minutes ago took 50 of benadryl and then came here has a history of being stung by 12 wasp at once and had airway compromise that time. She states not throat tightness at this time does feel like her lips are fuzzy.      HPI/ROS:  Joycelyn Benton is a 38 y.o. female presenting with the above chief complaint.    The sting is by her right ankle.  See note above for the history.  Patient is reporting it feels like she had lip injection.  They are tingling slightly numb and feel big.  Her  is here with her and I asked him if he thought her facial features looked unusual and he did think she had some swelling of her right lower cheek along the jawline.  Could not say for sure that her that her lips appeared swollen.    Patient is reporting some scratchy feeling in her throat.  Chest feels a little bit congested or that her breathing is heavy but she said she is now tired and that might be from the Benadryl.  She does not feel wheezing.  Is not hard to breathe.  Her oxygenation on arrival and while I am seeing her is in the mid 90s.    Patient was prescribed an EpiPen after the last episode but she did not bring it with her on this travel trip    PMH:  She reports silent GERD.  She has a IUD in place and she also use allergy to wash doing otherwise no ongoing medical history.  She did not report what she takes the Celexa for.  No current facility-administered medications on file prior to encounter.     Current Outpatient Medications on File Prior to Encounter   Medication Sig Dispense Refill    citalopram (CeleXA) 20 mg tablet Take 1 tablet (20 mg total) by mouth daily      omeprazole (PriLOSEC) 20 mg capsule Take 1 capsule (20 mg total) by mouth daily        PE:  ED Triage Vitals   Temp Heart Rate Resp BP SpO2   08/20/23 0935 08/20/23 0935 08/20/23 0935 08/20/23 0935 08/20/23 0935   36.6 °C (97.8 °F) 65 18 124/73 95 %       Mean BP (mmHg) Temp Source Heart Rate Source Patient Position BP Location   -- 08/20/23 0935 -- 08/20/23 1104 --    Oral  Sitting       FiO2 (%)       --                 Physical Exam  Vitals reviewed.   Constitutional:       General: She is not in acute distress.     Appearance: Normal appearance. She is well-developed.   HENT:      Head: Atraumatic.      Comments: Patient appears to have some puffiness of her right lower jaw down from her nasolabial groove.  Lips themselves are not notably swollen.         Left Ear: Tympanic membrane normal.      Mouth/Throat:      Mouth: Mucous membranes are moist.      Comments: Uvula looks mildly edematous.  There is no erythema  Eyes:      General: No scleral icterus.     Conjunctiva/sclera: Conjunctivae normal.   Cardiovascular:      Rate and Rhythm: Normal rate and regular rhythm.      Pulses: Normal pulses.   Pulmonary:      Effort: Pulmonary effort is normal. No respiratory distress.      Breath sounds: Normal breath sounds. No stridor. No wheezing, rhonchi or rales.   Musculoskeletal:         General: No deformity. Normal range of motion.      Cervical back: Normal range of motion and neck supple.      Right lower leg: No edema.      Left lower leg: No edema.   Skin:     General: Skin is warm and dry.      Capillary Refill: Capillary refill takes less than 2 seconds.      Comments: Patient has a area of sting and a localized reaction down near her right ankle laterally   Neurological:      General: No focal deficit present.      Mental Status: She is alert and oriented to person, place, and time.      Cranial Nerves: No cranial nerve deficit.      Coordination: Coordination normal.      Gait: Gait normal.   Psychiatric:         Mood and Affect: Mood normal.         Behavior: Behavior normal.         Thought Content: Thought content normal.         Judgment: Judgment normal.         ED COURSE/MDM:     Medical Decision Making  No acute distress on arrival.  She is here  with her .  Their kids are with the grandparents.  She is getting tired from the 50 mg of Benadryl she took prior to coming in.  This event occurred just prior to coming in maybe about 30 minutes.  She is not sure that it is really progressing but has not improved yet.    We discussed that there is probably not a lot to do for lab work here and as of yet she is not demonstrating symptoms that I would give her epinephrine for.  Suggested that we would use Solu-Medrol and then home on prednisone for 72 to 96 hours along with antihistamines.  She was agreeable to that plan.    Problems Addressed:  Allergic reaction to wasp sting: acute illness or injury with systemic symptoms     Details: Some concern for anaphylaxis given that she is got some uvula swelling and scratchiness in her throat and lip swelling.  She addressed it with Benadryl.  I do not see symptoms to be using epinephrine for and we gave her Solu-Medrol    Amount and/or Complexity of Data Reviewed  Independent Historian: spouse    Risk  Prescription drug management.  Risk Details: Patient did well through the ER visit.  No progression of symptoms.  We watched her for a couple of hours after administering the Solu-Medrol.  I explained to the patient there can be a second phase to anaphylaxis and is very important that she use the prednisone again this evening and be using the Benadryl through the first 24 to 48 hours.  That in addition to a 24-hour antihistamine.  She voiced understanding.  Patient instructions were given.         ASSESSMENT/PLAN   Diagnosis Plan   1. Allergic reaction to wasp sting  EPINEPHrine (EpiPen 2-Syed) 0.3 mg/0.3 mL injection syringe          INSTRUCTIONS:  Discharge Instructions         See handout on bee wasp or hornet stings.    We gave you steroid through the IV here in the ER.  We also sent prednisone tablets home with you.  You will start with those tonight.  3 of the 10 mg tablets.  You will take 3 of the 10 mg tablets  twice daily for 3 to 5 days.  You can stop after 3 days if it seems all symptoms have resolved.    In addition you will want to take Zyrtec 10 mg daily.  You had a dose of a 24-hour antihistamine here in the ER.  We gave you loratadine as we did not have any Zyrtec.  Zyrtec is a little more effective.    In addition for at least the first 24 to 48 hours use 25 mg Benadryl every 6 hours.          An After Visit Summary was printed and given to the patient.  FOLLOW UP:  Doctor at home         Kaylin Boo MD  08/20/23 [id number]

## 2023-08-20 NOTE — TELEPHONE ENCOUNTER
Patient calling. She was just stung by a wasp and doesn't have her epipen with her. She would like her prescription filled at her current location. She states that her mouth feels fuzzy. No hives or itching, but the sting site is swollen.     Patient has no listed order for an epipen.     Patient revealed in the middle of triage that she is in [place], [place] at this time. Emergency care advice given, and patient was encouraged to go to the emergency department at the local hospital if she was unwilling to call 911.    Britany Owusu RN  [facility]  August 20, 2023, 9:55 AM      Reason for Disposition   [1] Life-threatening reaction (anaphylaxis) in the past to sting AND [2] < 2 hours since sting    Protocols used: Bee or Yellow Jacket Sting-A-

## 2023-08-21 ENCOUNTER — TELEPHONE (OUTPATIENT)
Dept: FAMILY MEDICINE | Facility: CLINIC | Age: 38
End: 2023-08-21
Payer: COMMERCIAL

## 2023-08-21 DIAGNOSIS — K21.9 GASTROESOPHAGEAL REFLUX DISEASE WITHOUT ESOPHAGITIS: Primary | ICD-10-CM

## 2023-08-21 NOTE — TELEPHONE ENCOUNTER
Pended upper endoscopy order here. Please modify as needed :)    Usman Flores RN     [facility]'

## 2023-08-21 NOTE — TELEPHONE ENCOUNTER
Order/Referral Request    Who is requesting: Patient     Orders being requested: Upper GI Endoscopy     Reason service is needed/diagnosis: Repeat procedure, patient had it done on 9/20/21    When are orders needed by:  for September     Has this been discussed with Provider: Yes, during last visit with Dr. Guzman.       Does patient have an appointment scheduled?: No    Where to send orders: Place orders within Epic    Could we send this information to you in [place] or would you prefer to receive a phone call?:   Patient would prefer a phone call   Okay to leave a detailed message?: Yes at Cell number on file:    Telephone Information:   Mobile [phone]

## 2023-09-13 ENCOUNTER — OFFICE VISIT (OUTPATIENT)
Dept: FAMILY MEDICINE | Facility: CLINIC | Age: 38
End: 2023-09-13
Payer: COMMERCIAL

## 2023-09-13 VITALS
SYSTOLIC BLOOD PRESSURE: 108 MMHG | HEART RATE: 65 BPM | OXYGEN SATURATION: 97 % | TEMPERATURE: 97.4 F | DIASTOLIC BLOOD PRESSURE: 70 MMHG

## 2023-09-13 DIAGNOSIS — Z29.89 ALTITUDE SICKNESS PREVENTATIVE MEASURES: ICD-10-CM

## 2023-09-13 DIAGNOSIS — Z71.84 TRAVEL ADVICE ENCOUNTER: Primary | ICD-10-CM

## 2023-09-13 PROCEDURE — 90691 TYPHOID VACCINE IM: CPT | Mod: GA | Performed by: NURSE PRACTITIONER

## 2023-09-13 PROCEDURE — 90472 IMMUNIZATION ADMIN EACH ADD: CPT | Mod: GA | Performed by: NURSE PRACTITIONER

## 2023-09-13 PROCEDURE — 90632 HEPA VACCINE ADULT IM: CPT | Mod: GA | Performed by: NURSE PRACTITIONER

## 2023-09-13 PROCEDURE — 90471 IMMUNIZATION ADMIN: CPT | Mod: GA | Performed by: NURSE PRACTITIONER

## 2023-09-13 PROCEDURE — 99402 PREV MED CNSL INDIV APPRX 30: CPT | Mod: 25 | Performed by: NURSE PRACTITIONER

## 2023-09-13 RX ORDER — AZITHROMYCIN 500 MG/1
500 TABLET, FILM COATED ORAL DAILY
Qty: 3 TABLET | Refills: 0 | Status: SHIPPED | OUTPATIENT
Start: 2023-09-13 | End: 2023-09-16

## 2023-09-13 RX ORDER — ACETAZOLAMIDE 125 MG/1
TABLET ORAL
Qty: 6 TABLET | Refills: 0 | Status: SHIPPED | OUTPATIENT
Start: 2023-09-13 | End: 2023-12-28

## 2023-09-13 ASSESSMENT — PAIN SCALES - GENERAL: PAINLEVEL: NO PAIN (0)

## 2023-09-13 NOTE — PROGRESS NOTES
Nurse Note ( Pre-Travel Consult)    Itinerary:  [place]    Departure Date: 10/08/23    Return Date: 10/16/23    Length of Trip 8days    Reason for Travel: Tourism         Urban or rural: both    Accommodations: Hotel        IMMUNIZATION HISTORY  Have you received any immunizations within the past 4 weeks?  Yes  Have you ever fainted from having your blood drawn or from an injection?  no  Have you ever had a fever reaction to vaccination?  No  Have you ever had any bad reaction or side effect from any vaccination?  No  Have you ever had hepatitis A or B vaccine?  Yes  Do you live (or work closely) with anyone who has AIDS, an AIDS-like condition, any other immune disorder or who is on chemotherapy for cancer?  No  Do you have a family history of immunodeficiency?  No  Have you received any injection of immune globulin or any blood products during the past 12 months?  No    Patient roomed by carly River  Komal Parra is a 38 year old female seen today with spouse for counsultation for international travel.   Patient will be departing in  3 week(s) and  traveling with spouse.      Patient itinerary :  will be in the [place] transit > [facility] for 9-13th ( [place] within )  region of [place] > [place] 2 nights  which     Patient's activities will include sightseeing, hiking, and high altitude exposure.    Patient's country of birth is [place]    Special medical concerns: reflux  Pre-travel questionnaire was completed by patient and reviewed by provider.     Vitals: /70 (BP Location: Right arm, Patient Position: Sitting, Cuff Size: Adult Regular)   Pulse 65   Temp 97.4  F (36.3  C) (Temporal)   SpO2 97%   BMI= There is no height or weight on file to calculate BMI.    EXAM:  General:  Well-nourished, well-developed in no acute distress.  Appears to be stated age, interacts appropriately and expresses understanding of information given to patient.    Current Outpatient Medications   Medication Sig Dispense  Refill    citalopram (CELEXA) 20 MG tablet Take 1 tablet (20 mg) by mouth daily 90 tablet 3    omeprazole (PRILOSEC) 20 MG DR capsule Take 1 capsule (20 mg) by mouth 2 times daily 180 capsule 3    cyclobenzaprine (FLEXERIL) 5 MG tablet Take 1 tablet (5 mg) by mouth 2 times daily as needed for muscle spasms (Patient not taking: Reported on 9/13/2023) 20 tablet 0    dicyclomine (BENTYL) 10 MG capsule Take 1 capsule (10 mg) by mouth 4 times daily as needed (abdominal cramping) (Patient not taking: Reported on 9/13/2023) 60 capsule 3    famotidine (PEPCID) 20 MG tablet Take 1 tablet (20 mg) by mouth 2 times daily as needed (reflux) (Patient not taking: Reported on 9/13/2023) 90 tablet 1    Vitamin D, Cholecalciferol, 25 MCG (1000 UT) CAPS  (Patient not taking: Reported on 9/13/2023)       Patient Active Problem List   Diagnosis    Indication for care in labor or delivery     Allergies   Allergen Reactions    Ibuprofen GI Disturbance         Immunizations discussed include:   Covid 19: defer  Hepatitis A:  Ordered/given today, risks, benefits and side effects reviewed  Hepatitis B: Up to date  Influenza: deferred  Typhoid: Ordered/given today, risks, benefits and side effects reviewed  Rabies: Declined  reviewed managment of a animal bite or scratch (washing wound, seek medical care within 24 hours for post exposure prophylaxis )  Yellow Fever: Not indicated  Japanese Encephalitis: Not indicated  Meningococcus: Not indicated  Tetanus/Diphtheria: Up to date  Measles/Mumps/Rubella: Up to date  Cholera: Not needed  Polio: Not indicated  Pneumococcal: Under age of 65  Varicella: Immune by disease history per patient report  Shingrix: Not indicated  HPV:  Not indicated     TB: low risk travel     Altitude Exposure on this trip: yes [place] at 11.200 for 3 days   Past tolerance to Altitude: limited experience    ASSESSMENT/PLAN:  Komal was seen today for travel clinic.    Diagnoses and all orders for this visit:    Travel  advice encounter  -     azithromycin (ZITHROMAX) 500 MG tablet; Take 1 tablet (500 mg) by mouth daily for 3 doses Take 1 tablet a day for up to 3 days for severe diarrhea  -     acetaZOLAMIDE (DIAMOX) 125 MG tablet; To prevent altitude illness: Take one tab every 12 hours starting 24 hours prior to ascent and continue for 24 hours while at the highest altitude    Altitude sickness preventative measures    Other orders  -     HEPATITIS A 19+ (HAVRIX/VAQTA)  -     TYPHOID VACCINE, IM  -     HEPATITIS A 19+ (HAVRIX/VAQTA); Future      I have reviewed general recommendations for safe travel   including: food/water precautions, insect precautions,   roadway safety. Educational materials and [facility] report provided.    [place] prophylaxis recommended: none  Symptomatic treatment for traveler's diarrhea: azithromycin  Altitude illness prevention and treatment: .Diamox prescription given with instructions on use and education provided on Acute altitude illness recognition and prevention.          Evacuation insurance advised and resources were provided to patient.    Total visit time 30 minutes  with over 50% of time spent counseling patient and shared decision making as detailed above.    Tammie Michelle CNP  Certificate in Travel Health

## 2023-09-13 NOTE — NURSING NOTE
Prior to immunization administration, verified patients identity using patient s name and date of birth. Please see Immunization Activity for additional information.     Screening Questionnaire for Adult Immunization    Are you sick today?   No   Do you have allergies to medications, food, a vaccine component or latex?   No   Have you ever had a serious reaction after receiving a vaccination?   No   Do you have a long-term health problem with heart, lung, kidney, or metabolic disease (e.g., diabetes), asthma, a blood disorder, no spleen, complement component deficiency, a cochlear implant, or a spinal fluid leak?  Are you on long-term aspirin therapy?   No   Do you have cancer, leukemia, HIV/AIDS, or any other immune system problem?   No   Do you have a parent, brother, or sister with an immune system problem?   No   In the past 3 months, have you taken medications that affect  your immune system, such as prednisone, other steroids, or anticancer drugs; drugs for the treatment of rheumatoid arthritis, Crohn s disease, or psoriasis; or have you had radiation treatments?   No   Have you had a seizure, or a brain or other nervous system problem?   No   During the past year, have you received a transfusion of blood or blood    products, or been given immune (gamma) globulin or antiviral drug?   No   For women: Are you pregnant or is there a chance you could become       pregnant during the next month?   No   Have you received any vaccinations in the past 4 weeks?   No     Immunization questionnaire answers were all negative.      Patient instructed to remain in clinic for 15 minutes afterwards, and to report any adverse reactions.     Screening performed by Ronny Faria MA on 9/13/2023 at 4:10 PM.

## 2023-09-13 NOTE — PATIENT INSTRUCTIONS
Thank you for visiting the [facility] : [phone]  Today September 13, 2023 you received the    Hepatitis A Vaccine - Please return on 3/11/24 or later for your 2nd and final dose.    Typhoid - injectable. This vaccine is valid for two years.     Follow up vaccine appointments can be made as a NURSE ONLY visit at the Travel Clinic, (BE PREPARED TO WAIT, ) or at designated [facility].    If you are receiving the Rabies vaccines series, it is important that you follow the exact schedule ordered.     Pre-travel     We recommend that you purchase Medical Evacuation Insurance prior to your departure.  Https://wwwnc.cdc.gov/travel/page/insurance    [place] your travel plans with the [place] Department of State through STEP ( Smart Traveler Enrollment Program ) https://step.[employer] Clinicals.gov.  STEP is a free service to allow U[place] citizens and nationals traveling and living abroad to enroll their trip with the nearest U.S. Embassy or Consulate.    Animal Exposure: Avoid all mammals even if they look healthy.  If there is a bite, scratch or even a lick, wash area immediately with soap and water for 15 minutes and seek medical care within 24 hours for evaluation of Rabies post exposure treatment.  Contact your Medical Evacuation Insurance.    Repiratory illness prevention strategies ( Covid and Influenza ) CDC recommendations:  Consider wearing a mask in crowded or poorly ventilated indoor areas, including on public transportation and in transportation hubs.  Hand washing: frequent, thorough handwashing with soap and water for 20 seconds. Use an alcohol-based hand  with at least 60% alcohol if soap and water are not readily available. Avoid touching face, nose, eyes, mouth unless you have done appropriate hand washing as above.  VACCINES: Staying up to date on COVID-19 vaccines significantly lowers the risk of getting very sick, being hospitalized, or dying from COVID-19. CDC  recommends that everyone stay up to date on their COVID-19 vaccines, especially people with weakened immune systems.    Travel Covid 19 Testing:  updated 12/06/2021  International travelers: Pre-travel:  See country specific Embassy websites or airline websites.    Post travel: CDC recommends getting tested 3-5 days after your trip     Post-travel illness:  Contact your provider or [facility] if you develop a fever, rash, cough, diarrhea or other symptoms for up to 1 year after travel.  Inform your healthcare provider when and where you traveled to.    Please call the [facility] with any questions [phone]  Or send your provider a 'My Chart' note.

## 2023-10-23 ENCOUNTER — TELEPHONE (OUTPATIENT)
Dept: GASTROENTEROLOGY | Facility: CLINIC | Age: 38
End: 2023-10-23
Payer: COMMERCIAL

## 2023-10-23 NOTE — TELEPHONE ENCOUNTER
"Endoscopy Scheduling Screen    Have you had a positive Covid test in the last 14 days?  No    Are you active on MyChart?   Yes    What insurance is in the chart?  Other:  [facility]    Ordering/Referring Provider: SUDHEER ROONEY    (If ordering provider performs procedure, schedule with ordering provider unless otherwise instructed. )    BMI: Estimated body mass index is 32.95 kg/m  as calculated from the following:    Height as of 1/3/23: 1.695 m (5' 6.75\").    Weight as of 1/26/22: 94.7 kg (208 lb 12.8 oz).     Sedation Ordered  moderate sedation.   If patient BMI > 50 do not schedule in [facility].    If patient BMI > 45 do not schedule at [facility].    Are you taking methadone or Suboxone?  No    Are you taking any prescription medications for pain 3 or more times per week?   No    Do you have a history of malignant hyperthermia or adverse reaction to anesthesia?  No    (Females) Are you currently pregnant?   No     Have you been diagnosed or told you have pulmonary hypertension?   No    Do you have an LVAD?  No    Have you been told you have moderate to severe sleep apnea?  No    Have you been told you have COPD, asthma, or any other lung disease?  No    Do you have any heart conditions?  No     Have you ever had an organ transplant?   No    Have you ever had or are you awaiting a heart or lung transplant?   No    Have you had a stroke or transient ischemic attack (TIA aka \"mini stroke\" in the last 6 months?   No    Have you been diagnosed with or been told you have cirrhosis of the liver?   No    Are you currently on dialysis?   No    Do you need assistance transferring?   No    BMI: Estimated body mass index is 32.95 kg/m  as calculated from the following:    Height as of 1/3/23: 1.695 m (5' 6.75\").    Weight as of 1/26/22: 94.7 kg (208 lb 12.8 oz).     Is patients BMI > 40 and scheduling location [facility]?  No    Do you take an injectable medication for weight loss or diabetes (excluding insulin)?  No    Do you take the medication " Naltrexone?  No    Do you take blood thinners?  No       Prep   Are you currently on dialysis or do you have chronic kidney disease?  No    Do you have a diagnosis of diabetes?  No    Do you have a diagnosis of cystic fibrosis (CF)?  No    On a regular basis do you go 3 -5 days between bowel movements?      BMI > 40?      Preferred Pharmacy:    [id number] [address]  [address] [facility] [address]  Phone: [phone] Fax: [phone]    Final Scheduling Details   Colonoscopy prep sent?  N/A    Procedure scheduled  Upper endoscopy (EGD)    Surgeon:  ANA     Date of procedure:  12/28/2023     Pre-OP / PAC:   No - Not required for this site.    Location  [facility] - [facility] - Patient preference.    Sedation   MAC/Deep Sedation - Patient preference.      Patient Reminders:   You will receive a call from a Nurse to review instructions and health history.  This assessment must be completed prior to your procedure.  Failure to complete the Nurse assessment may result in the procedure being cancelled.      On the day of your procedure, please designate an adult(s) who can drive you home stay with you for the next 24 hours. The medicines used in the exam will make you sleepy. You will not be able to drive.      You cannot take public transportation, ride share services, or non-medical taxi service without a responsible caregiver.  Medical transport services are allowed with the requirement that a responsible caregiver will receive you at your destination.  We require that drivers and caregivers are confirmed prior to your procedure.

## 2023-12-11 ENCOUNTER — TELEPHONE (OUTPATIENT)
Dept: GASTROENTEROLOGY | Facility: CLINIC | Age: 38
End: 2023-12-11
Payer: COMMERCIAL

## 2023-12-11 NOTE — TELEPHONE ENCOUNTER
Pre visit planning completed.      Procedure details:    Patient scheduled for Upper endoscopy (EGD) on 12/28/23.     Arrival time: 1130. Procedure time 1230    Pre op exam needed? N/A    Facility location: [facility]; [address] [place] [address], [address] Floor, [place] [address]    Sedation type: MAC    Indication for procedure: gerd      Chart review:     Electronic implanted devices? No    Recent diagnosis of diverticulitis within the last 6 weeks? No    Diabetic? No      Medication review:    Anticoagulants? No    NSAIDS? No    Other medication HOLDING recommendations:  N/A      Prep for procedure:     Bowel prep recommendation: N/A     Prep instructions sent via [employer]       Mena Sexton RN  Endoscopy Procedure Pre Assessment RN  [phone] option 4

## 2023-12-12 NOTE — TELEPHONE ENCOUNTER
Pre assessment completed for upcoming procedure.      Procedure details:    Patient scheduled for Upper endoscopy (EGD) on 12/28/23.     Arrival time: 1130. Procedure time 1230     Pre op exam needed? N/A     Facility location: [facility]; [address] [place] [address], [address] Floor, [place] [address]     Sedation type: MAC    COVID policy reviewed.    Designated  policy reviewed. Instructed to have someone stay 24 hours post procedure.       Prep for procedure:       Reviewed procedure prep instructions.     Patient verbalized understanding and had no questions or concerns at this time.        Mena Solares RN  Endoscopy Procedure Pre Assessment RN  [phone] option 4

## 2023-12-27 ENCOUNTER — ANESTHESIA EVENT (OUTPATIENT)
Dept: SURGERY | Facility: AMBULATORY SURGERY CENTER | Age: 38
End: 2023-12-27
Payer: COMMERCIAL

## 2023-12-27 NOTE — ANESTHESIA PREPROCEDURE EVALUATION
Anesthesia Pre-Procedure Evaluation    Patient: Komal Parra   MRN: [id number] : 1985        Procedure : Procedure(s):  Esophagoscopy, gastroscopy, duodenoscopy (EGD), combined          Past Medical History:   Diagnosis Date     KELLEY (generalized anxiety disorder)     Celexa 20 mg daily     IBS (irritable bowel syndrome)       Past Surgical History:   Procedure Laterality Date     ESOPHAGOSCOPY, GASTROSCOPY, DUODENOSCOPY (EGD), COMBINED N/A 2021    Procedure: ESOPHAGOGASTRODUODENOSCOPY (EGD);  Surgeon: Rashad Barber MD;  Location: [facility]      TOOTH EXTRACTION W/FORCEP       WISDOM TOOTH EXTRACTION        Allergies   Allergen Reactions     Ibuprofen GI Disturbance      Social History     Tobacco Use     Smoking status: Never     Smokeless tobacco: Never   Substance Use Topics     Alcohol use: No      Wt Readings from Last 1 Encounters:   22 94.7 kg (208 lb 12.8 oz)        Anesthesia Evaluation   Pt has had prior anesthetic.     No history of anesthetic complications       ROS/MED HX  ENT/Pulmonary:  - neg pulmonary ROS     Neurologic:  - neg neurologic ROS     Cardiovascular:  - neg cardiovascular ROS     METS/Exercise Tolerance: >4 METS    Hematologic:  - neg hematologic  ROS     Musculoskeletal:  - neg musculoskeletal ROS     GI/Hepatic:       Renal/Genitourinary:  - neg Renal ROS     Endo:  - neg endo ROS     Psychiatric/Substance Use:     (+) psychiatric history anxiety       Infectious Disease:  - neg infectious disease ROS     Malignancy:  - neg malignancy ROS     Other:  - neg other ROS          Physical Exam    Airway  airway exam normal      Mallampati: II   TM distance: > 3 FB   Neck ROM: full   Mouth opening: > 3 cm    Respiratory Devices and Support         Dental       (+) Completely normal teeth      Cardiovascular   cardiovascular exam normal       Rhythm and rate: regular and normal     Pulmonary   pulmonary exam normal        breath sounds clear to  "auscultation         OUTSIDE LABS:  CBC:   Lab Results   Component Value Date    WBC 5.2 03/24/2021    HGB 13.9 03/24/2021    HGB 12.3 02/15/2019    HCT 42.1 03/24/2021     03/24/2021     BMP:   Lab Results   Component Value Date     03/24/2021    POTASSIUM 4.2 03/24/2021    CHLORIDE 108 (H) 03/24/2021    CO2 24 03/24/2021    BUN 11 03/24/2021    CR 0.75 03/24/2021    GLC 73 03/24/2021     COAGS: No results found for: \"PTT\", \"INR\", \"FIBR\"  POC:   Lab Results   Component Value Date    HCG Negative 09/20/2021     HEPATIC:   Lab Results   Component Value Date    ALBUMIN 3.8 03/24/2021    PROTTOTAL 6.6 03/24/2021    ALT 11 03/24/2021    AST 19 03/24/2021    ALKPHOS 55 03/24/2021    BILITOTAL 0.5 03/24/2021     OTHER:   Lab Results   Component Value Date    A1C 5.2 12/03/2015    KERRI 8.7 03/24/2021    TSH 1.99 02/15/2019       Anesthesia Plan    ASA Status:  1    NPO Status:  NPO Appropriate    Anesthesia Type: MAC.     - Reason for MAC: Deep or markedly invasive procedure (G8)   Induction: Propofol.   Maintenance: N/A.        Consents    Anesthesia Plan(s) and associated risks, benefits, and realistic alternatives discussed. Questions answered and patient/representative(s) expressed understanding.     - Discussed: Risks, Benefits and Alternatives for BOTH SEDATION and the PROCEDURE were discussed     - Discussed with:  Patient       Use of blood products discussed: No .     Postoperative Care            Comments:           H&P reviewed: Unable to attach H&P to encounter due to EHR limitations. H&P Update: appropriate H&P reviewed, patient examined. No interval changes since H&P (within 30 days).        Blaise Cabrera,     I have reviewed the pertinent notes and labs in the chart from the past 30 days and (re)examined the patient.  Any updates or changes from those notes are reflected in this note.                  "

## 2023-12-28 ENCOUNTER — ANESTHESIA (OUTPATIENT)
Dept: SURGERY | Facility: AMBULATORY SURGERY CENTER | Age: 38
End: 2023-12-28
Payer: COMMERCIAL

## 2023-12-28 ENCOUNTER — HOSPITAL ENCOUNTER (OUTPATIENT)
Facility: AMBULATORY SURGERY CENTER | Age: 38
Discharge: HOME | End: 2023-12-28
Attending: STUDENT IN AN ORGANIZED HEALTH CARE EDUCATION/TRAINING PROGRAM
Payer: COMMERCIAL

## 2023-12-28 VITALS
HEIGHT: 67 IN | RESPIRATION RATE: 16 BRPM | DIASTOLIC BLOOD PRESSURE: 57 MMHG | TEMPERATURE: 97.8 F | HEART RATE: 61 BPM | WEIGHT: 200 LBS | SYSTOLIC BLOOD PRESSURE: 97 MMHG | OXYGEN SATURATION: 97 % | BODY MASS INDEX: 31.39 KG/M2

## 2023-12-28 VITALS — HEART RATE: 74 BPM

## 2023-12-28 LAB
HCG UR QL: NEGATIVE
INTERNAL QC OK POCT: NORMAL
POCT KIT EXPIRATION DATE: NORMAL
POCT KIT LOT NUMBER: NORMAL
UPPER GI ENDOSCOPY: NORMAL

## 2023-12-28 PROCEDURE — 81025 URINE PREGNANCY TEST: CPT | Performed by: PATHOLOGY

## 2023-12-28 PROCEDURE — 88305 TISSUE EXAM BY PATHOLOGIST: CPT | Mod: 26 | Performed by: PATHOLOGY

## 2023-12-28 PROCEDURE — 88305 TISSUE EXAM BY PATHOLOGIST: CPT | Mod: TC | Performed by: STUDENT IN AN ORGANIZED HEALTH CARE EDUCATION/TRAINING PROGRAM

## 2023-12-28 PROCEDURE — 43239 EGD BIOPSY SINGLE/MULTIPLE: CPT | Performed by: STUDENT IN AN ORGANIZED HEALTH CARE EDUCATION/TRAINING PROGRAM

## 2023-12-28 RX ORDER — PROCHLORPERAZINE MALEATE 10 MG
10 TABLET ORAL EVERY 6 HOURS PRN
Status: DISCONTINUED | OUTPATIENT
Start: 2023-12-28 | End: 2023-12-29 | Stop reason: HOSPADM

## 2023-12-28 RX ORDER — NALOXONE HYDROCHLORIDE 0.4 MG/ML
0.2 INJECTION, SOLUTION INTRAMUSCULAR; INTRAVENOUS; SUBCUTANEOUS
Status: DISCONTINUED | OUTPATIENT
Start: 2023-12-28 | End: 2023-12-29 | Stop reason: HOSPADM

## 2023-12-28 RX ORDER — PROPOFOL 10 MG/ML
INJECTION, EMULSION INTRAVENOUS CONTINUOUS PRN
Status: DISCONTINUED | OUTPATIENT
Start: 2023-12-28 | End: 2023-12-28

## 2023-12-28 RX ORDER — PROPOFOL 10 MG/ML
INJECTION, EMULSION INTRAVENOUS PRN
Status: DISCONTINUED | OUTPATIENT
Start: 2023-12-28 | End: 2023-12-28

## 2023-12-28 RX ORDER — NALOXONE HYDROCHLORIDE 0.4 MG/ML
0.4 INJECTION, SOLUTION INTRAMUSCULAR; INTRAVENOUS; SUBCUTANEOUS
Status: DISCONTINUED | OUTPATIENT
Start: 2023-12-28 | End: 2023-12-29 | Stop reason: HOSPADM

## 2023-12-28 RX ORDER — SODIUM CHLORIDE, SODIUM LACTATE, POTASSIUM CHLORIDE, CALCIUM CHLORIDE 600; 310; 30; 20 MG/100ML; MG/100ML; MG/100ML; MG/100ML
INJECTION, SOLUTION INTRAVENOUS CONTINUOUS
Status: DISCONTINUED | OUTPATIENT
Start: 2023-12-28 | End: 2023-12-28 | Stop reason: HOSPADM

## 2023-12-28 RX ORDER — LIDOCAINE HYDROCHLORIDE 20 MG/ML
INJECTION, SOLUTION INFILTRATION; PERINEURAL PRN
Status: DISCONTINUED | OUTPATIENT
Start: 2023-12-28 | End: 2023-12-28

## 2023-12-28 RX ORDER — ONDANSETRON 2 MG/ML
4 INJECTION INTRAMUSCULAR; INTRAVENOUS EVERY 6 HOURS PRN
Status: DISCONTINUED | OUTPATIENT
Start: 2023-12-28 | End: 2023-12-29 | Stop reason: HOSPADM

## 2023-12-28 RX ORDER — ONDANSETRON 4 MG/1
4 TABLET, ORALLY DISINTEGRATING ORAL EVERY 6 HOURS PRN
Status: DISCONTINUED | OUTPATIENT
Start: 2023-12-28 | End: 2023-12-29 | Stop reason: HOSPADM

## 2023-12-28 RX ORDER — LIDOCAINE 40 MG/G
CREAM TOPICAL
Status: DISCONTINUED | OUTPATIENT
Start: 2023-12-28 | End: 2023-12-28 | Stop reason: HOSPADM

## 2023-12-28 RX ORDER — ONDANSETRON 2 MG/ML
4 INJECTION INTRAMUSCULAR; INTRAVENOUS
Status: DISCONTINUED | OUTPATIENT
Start: 2023-12-28 | End: 2023-12-28 | Stop reason: HOSPADM

## 2023-12-28 RX ORDER — FLUMAZENIL 0.1 MG/ML
0.2 INJECTION, SOLUTION INTRAVENOUS
Status: DISCONTINUED | OUTPATIENT
Start: 2023-12-28 | End: 2023-12-29 | Stop reason: HOSPADM

## 2023-12-28 RX ADMIN — SODIUM CHLORIDE, SODIUM LACTATE, POTASSIUM CHLORIDE, CALCIUM CHLORIDE: 600; 310; 30; 20 INJECTION, SOLUTION INTRAVENOUS at 12:07

## 2023-12-28 RX ADMIN — PROPOFOL 200 MCG/KG/MIN: 10 INJECTION, EMULSION INTRAVENOUS at 13:05

## 2023-12-28 RX ADMIN — PROPOFOL 200 MCG/KG/MIN: 10 INJECTION, EMULSION INTRAVENOUS at 12:49

## 2023-12-28 RX ADMIN — LIDOCAINE HYDROCHLORIDE 60 MG: 20 INJECTION, SOLUTION INFILTRATION; PERINEURAL at 12:51

## 2023-12-28 RX ADMIN — PROPOFOL 100 MG: 10 INJECTION, EMULSION INTRAVENOUS at 12:51

## 2023-12-28 NOTE — ANESTHESIA POSTPROCEDURE EVALUATION
Patient: Komal Parra    Procedure: Procedure(s):  ESOPHAGOGASTRODUODENOSCOPY, WITH BIOPSY       Anesthesia Type:  MAC    Note:  Disposition: Outpatient   Postop Pain Control: Uneventful            Sign Out: Well controlled pain   PONV: No   Neuro/Psych: Uneventful            Sign Out: Acceptable/Baseline neuro status   Airway/Respiratory: Uneventful            Sign Out: Acceptable/Baseline resp. status   CV/Hemodynamics: Uneventful            Sign Out: Acceptable CV status; No obvious hypovolemia; No obvious fluid overload   Other NRE: NONE   DID A NON-ROUTINE EVENT OCCUR? No       Last vitals:  Vitals Value Taken Time   BP 97/57 12/28/23 1400   Temp 36.6  C (97.8  F) 12/28/23 1400   Pulse     Resp 16 12/28/23 1400   SpO2 97 % 12/28/23 1400       Electronically Signed By: Brittny Hinojosa MD  December 28, 2023  2:48 PM

## 2023-12-28 NOTE — H&P
Komal Parra  [id number]  female  38 year old      Reason for procedure/surgery: egd for gerd, history of esophagitis  Family history of esophageal cancer    Patient Active Problem List   Diagnosis    Indication for care in labor or delivery       Past Surgical History:    Past Surgical History:   Procedure Laterality Date    ESOPHAGOSCOPY, GASTROSCOPY, DUODENOSCOPY (EGD), COMBINED N/A 9/20/2021    Procedure: ESOPHAGOGASTRODUODENOSCOPY (EGD);  Surgeon: Rashad Barber MD;  Location: [facility] OR     TOOTH EXTRACTION W/FORCEP  2002    WISDOM TOOTH EXTRACTION         Past Medical History:   Past Medical History:   Diagnosis Date    KELLEY (generalized anxiety disorder)     Celexa 20 mg daily    IBS (irritable bowel syndrome)        Social History:   Social History     Tobacco Use    Smoking status: Never    Smokeless tobacco: Never   Substance Use Topics    Alcohol use: No       Family History:   Family History   Problem Relation Age of Onset    No Known Problems Mother     Esophageal Cancer Father     Lung Cancer Father     Cancer Father     Ovarian Cancer Maternal Grandmother 30.00    Cervical Cancer Maternal Grandmother     Rheumatic fever Maternal Grandmother     Heart Disease Maternal Grandmother     Brain Cancer Maternal Grandfather     Breast Cancer Other     Asthma Son     Anxiety Disorder Son        Allergies:   Allergies   Allergen Reactions    Ibuprofen GI Disturbance       Active Medications:   Current Outpatient Medications   Medication Sig Dispense Refill    citalopram (CELEXA) 20 MG tablet Take 1 tablet (20 mg) by mouth daily 90 tablet 3    dicyclomine (BENTYL) 10 MG capsule Take 1 capsule (10 mg) by mouth 4 times daily as needed (abdominal cramping) 60 capsule 3    famotidine (PEPCID) 20 MG tablet Take 1 tablet (20 mg) by mouth 2 times daily as needed (reflux) 90 tablet 1    omeprazole (PRILOSEC) 20 MG DR capsule Take 1 capsule (20 mg) by mouth 2 times daily 180 capsule 3    Vitamin D,  "Cholecalciferol, 25 MCG (1000 UT) CAPS          Systemic Review:   CONSTITUTIONAL: NEGATIVE for fever, chills, change in weight  ENT/MOUTH: NEGATIVE for ear, mouth and throat problems  RESP: NEGATIVE for significant cough or SOB  CV: NEGATIVE for chest pain, palpitations or peripheral edema    Physical Examination:   Vital Signs: /54 (BP Location: Right arm)   Pulse 61   Temp 97.6  F (36.4  C) (Temporal)   Resp 16   Ht 1.702 m (5' 7\")   Wt 90.7 kg (200 lb)   SpO2 97%   BMI 31.32 kg/m    GENERAL: healthy, alert and no distress  NECK: no adenopathy, no asymmetry, masses, or scars  RESP: lungs clear to auscultation - no rales, rhonchi or wheezes  CV: regular rate and rhythm, normal S1 S2, no S3 or S4, no murmur, click or rub, no peripheral edema and peripheral pulses strong  ABDOMEN: soft, nontender, no hepatosplenomegaly, no masses and bowel sounds normal  MS: no gross musculoskeletal defects noted, no edema    Plan: Appropriate to proceed as scheduled.      Sandra Aldrich MD  12/28/2023    PCP:  Dorota Guzman    "

## 2023-12-29 LAB
PATH REPORT.COMMENTS IMP SPEC: NORMAL
PATH REPORT.COMMENTS IMP SPEC: NORMAL
PATH REPORT.FINAL DX SPEC: NORMAL
PATH REPORT.GROSS SPEC: NORMAL
PATH REPORT.MICROSCOPIC SPEC OTHER STN: NORMAL
PATH REPORT.RELEVANT HX SPEC: NORMAL
PHOTO IMAGE: NORMAL

## 2024-01-17 ENCOUNTER — TRANSFERRED RECORDS (OUTPATIENT)
Dept: HEALTH INFORMATION MANAGEMENT | Facility: CLINIC | Age: 39
End: 2024-01-17
Payer: COMMERCIAL

## 2024-01-17 ENCOUNTER — TELEPHONE (OUTPATIENT)
Dept: FAMILY MEDICINE | Facility: CLINIC | Age: 39
End: 2024-01-17
Payer: COMMERCIAL

## 2024-01-17 NOTE — TELEPHONE ENCOUNTER
General Call    Contacts         Type Contact Phone/Fax    01/17/2024 03:37 PM CST Phone (Incoming) Komal Parra (Self) [phone] (H)          Reason for Call:  Appointment Question    What are your questions or concerns:  Patient is scheduled for annual physical tomorrow (1/18/24) with Dr. Guzman. She will be having a right wrist repair with [facility] on 2/1 and would like to know if pre-op can be done at physical or if two separate visits will be needed. She is agreeable to having pre-op tomorrow and scheduling separate visit for annual physical if necessary.    Appointment notes updated but visit type has not been changed until care team can advise.    [facility] or would you prefer to receive a phone call?:   Patient would prefer a phone call   Okay to leave a detailed message?: Yes at Cell number on file:    Telephone Information:   Mobile [phone]

## 2024-01-18 ENCOUNTER — OFFICE VISIT (OUTPATIENT)
Dept: FAMILY MEDICINE | Facility: CLINIC | Age: 39
End: 2024-01-18
Payer: COMMERCIAL

## 2024-01-18 VITALS
DIASTOLIC BLOOD PRESSURE: 72 MMHG | SYSTOLIC BLOOD PRESSURE: 121 MMHG | RESPIRATION RATE: 10 BRPM | HEIGHT: 67 IN | WEIGHT: 221.2 LBS | HEART RATE: 61 BPM | TEMPERATURE: 97.7 F | BODY MASS INDEX: 34.72 KG/M2

## 2024-01-18 DIAGNOSIS — F41.9 ANXIETY: ICD-10-CM

## 2024-01-18 DIAGNOSIS — K21.9 GASTROESOPHAGEAL REFLUX DISEASE WITHOUT ESOPHAGITIS: ICD-10-CM

## 2024-01-18 DIAGNOSIS — M67.431 GANGLION CYST OF DORSUM OF RIGHT WRIST: ICD-10-CM

## 2024-01-18 DIAGNOSIS — Z01.818 PRE-OP EXAM: ICD-10-CM

## 2024-01-18 LAB
ERYTHROCYTE [DISTWIDTH] IN BLOOD BY AUTOMATED COUNT: 12.5 % (ref 10–15)
HCG UR QL: NEGATIVE
HCT VFR BLD AUTO: 39.7 % (ref 35–47)
HGB BLD-MCNC: 13.3 G/DL (ref 11.7–15.7)
MCH RBC QN AUTO: 31.7 PG (ref 26.5–33)
MCHC RBC AUTO-ENTMCNC: 33.5 G/DL (ref 31.5–36.5)
MCV RBC AUTO: 95 FL (ref 78–100)
PLATELET # BLD AUTO: 247 10E3/UL (ref 150–450)
RBC # BLD AUTO: 4.2 10E6/UL (ref 3.8–5.2)
WBC # BLD AUTO: 6 10E3/UL (ref 4–11)

## 2024-01-18 PROCEDURE — 80048 BASIC METABOLIC PNL TOTAL CA: CPT | Performed by: FAMILY MEDICINE

## 2024-01-18 PROCEDURE — 81025 URINE PREGNANCY TEST: CPT | Performed by: FAMILY MEDICINE

## 2024-01-18 PROCEDURE — 36415 COLL VENOUS BLD VENIPUNCTURE: CPT | Performed by: FAMILY MEDICINE

## 2024-01-18 PROCEDURE — 99214 OFFICE O/P EST MOD 30 MIN: CPT | Performed by: FAMILY MEDICINE

## 2024-01-18 PROCEDURE — 85027 COMPLETE CBC AUTOMATED: CPT | Performed by: FAMILY MEDICINE

## 2024-01-18 RX ORDER — LEVONORGESTREL 52 MG/1
1 INTRAUTERINE DEVICE INTRAUTERINE
COMMUNITY

## 2024-01-18 RX ORDER — EPINEPHRINE 0.3 MG/.3ML
INJECTION SUBCUTANEOUS
COMMUNITY
Start: 2023-08-23

## 2024-01-18 ASSESSMENT — ENCOUNTER SYMPTOMS
HEMATOCHEZIA: 0
HEADACHES: 0
HEARTBURN: 0
CHILLS: 0
MYALGIAS: 0
FREQUENCY: 0
FEVER: 0
JOINT SWELLING: 0
PARESTHESIAS: 0
SORE THROAT: 0
DIARRHEA: 0
EYE PAIN: 0
NERVOUS/ANXIOUS: 0
ABDOMINAL PAIN: 0
ARTHRALGIAS: 0
DYSURIA: 0
SHORTNESS OF BREATH: 0
NAUSEA: 0
CONSTIPATION: 0
COUGH: 0
WEAKNESS: 0
DIZZINESS: 0
HEMATURIA: 0
PALPITATIONS: 0

## 2024-01-18 ASSESSMENT — ANXIETY QUESTIONNAIRES
2. NOT BEING ABLE TO STOP OR CONTROL WORRYING: NOT AT ALL
8. IF YOU CHECKED OFF ANY PROBLEMS, HOW DIFFICULT HAVE THESE MADE IT FOR YOU TO DO YOUR WORK, TAKE CARE OF THINGS AT HOME, OR GET ALONG WITH OTHER PEOPLE?: NOT DIFFICULT AT ALL
7. FEELING AFRAID AS IF SOMETHING AWFUL MIGHT HAPPEN: NOT AT ALL
5. BEING SO RESTLESS THAT IT IS HARD TO SIT STILL: NOT AT ALL
3. WORRYING TOO MUCH ABOUT DIFFERENT THINGS: NOT AT ALL
1. FEELING NERVOUS, ANXIOUS, OR ON EDGE: NOT AT ALL
GAD7 TOTAL SCORE: 0
4. TROUBLE RELAXING: NOT AT ALL
GAD7 TOTAL SCORE: 0
IF YOU CHECKED OFF ANY PROBLEMS ON THIS QUESTIONNAIRE, HOW DIFFICULT HAVE THESE PROBLEMS MADE IT FOR YOU TO DO YOUR WORK, TAKE CARE OF THINGS AT HOME, OR GET ALONG WITH OTHER PEOPLE: NOT DIFFICULT AT ALL
7. FEELING AFRAID AS IF SOMETHING AWFUL MIGHT HAPPEN: NOT AT ALL
6. BECOMING EASILY ANNOYED OR IRRITABLE: NOT AT ALL
GAD7 TOTAL SCORE: 0

## 2024-01-18 ASSESSMENT — PATIENT HEALTH QUESTIONNAIRE - PHQ9
SUM OF ALL RESPONSES TO PHQ QUESTIONS 1-9: 0
SUM OF ALL RESPONSES TO PHQ QUESTIONS 1-9: 0

## 2024-01-18 NOTE — LETTER
January 20, 2024      Komal Parra  [address] [facility] [address]        Dear ,  We are writing to inform you of your test results.    Justin Sauceda:  Your pre-op labs were all NORMAL including a negative pregnancy test.  We will fax these labs to your surgical facility.    Resulted Orders   CBC with platelets   Result Value Ref Range    WBC Count 6.0 4.0 - 11.0 10e3/uL    RBC Count 4.20 3.80 - 5.20 10e6/uL    Hemoglobin 13.3 11.7 - 15.7 g/dL    Hematocrit 39.7 35.0 - 47.0 %    MCV 95 78 - 100 fL    MCH 31.7 26.5 - 33.0 pg    MCHC 33.5 31.5 - 36.5 g/dL    RDW 12.5 10.0 - 15.0 %    Platelet Count 247 150 - 450 10e3/uL   Basic metabolic panel  (Ca, Cl, CO2, Creat, Gluc, K, Na, BUN)   Result Value Ref Range    Sodium 138 135 - 145 mmol/L      Comment:      Reference intervals for this test were updated on 09/26/2023 to more accurately reflect our healthy population. There may be differences in the flagging of prior results with similar values performed with this method. Interpretation of those prior results can be made in the context of the updated reference intervals.     Potassium 4.0 3.4 - 5.3 mmol/L    Chloride 101 98 - 107 mmol/L    Carbon Dioxide (CO2) 26 22 - 29 mmol/L    Anion Gap 11 7 - 15 mmol/L    Urea Nitrogen 16.1 6.0 - 20.0 mg/dL    Creatinine 0.98 (H) 0.51 - 0.95 mg/dL    GFR Estimate 76 >60 mL/min/1.73m2    Calcium 9.5 8.6 - 10.0 mg/dL    Glucose 86 70 - 99 mg/dL   HCG qualitative urine   Result Value Ref Range    hCG Urine Qualitative Negative Negative      Comment:      This test is for screening purposes.  Results should be interpreted along with the clinical picture.  Confirmation testing is available if warranted by ordering BCV450, HCG Quantitative Pregnancy.       If you have any questions or concerns, please call the clinic at the number listed above.       Sincerely,      Gregor Yeh MD

## 2024-01-18 NOTE — PROGRESS NOTES
Preoperative Evaluation  [facility]  [address] [facility] [address]  Phone: [phone]  Fax: [phone]  Primary Provider: Dorota Guzman  Pre-op Performing Provider: TYREE BROCK  Jan 18, 2024       Komal is a 38 year old, presenting for the following:  Pre-Op Exam        1/18/2024     4:19 PM   Additional Questions   Roomed by DORIAN Little CMA([facility])     Surgical Information  Surgery/Procedure: Right wrist repair  Surgery Location: [facility]  Surgeon: Dr. Carbone  Surgery Date: 02/01/2024  Time of Surgery: TBD  Where patient plans to recover: At home with family  Fax number for surgical facility: [facility]:  [phone] (attn:  Kiersten)    Assessment & Plan     The proposed surgical procedure is considered INTERMEDIATE risk.    Pre-op exam      Ganglion cyst of dorsum of right wrist      Gastroesophageal reflux disease without esophagitis  stable    Anxiety  stable       - No identified additional risk factors other than previously addressed    Antiplatelet or Anticoagulation Medication Instructions   - Patient is on no antiplatelet or anticoagulation medications.    Additional Medication Instructions  Patient is to take all scheduled medications on the day of surgery    Recommendation  APPROVAL GIVEN to proceed with proposed procedure, without further diagnostic evaluation.          Subjective       HPI related to upcoming procedure: ganglion cyst causing pain        1/18/2024     4:09 PM   Preop Questions   1. Have you ever had a heart attack or stroke? No   2. Have you ever had surgery on your heart or blood vessels, such as a stent placement, a coronary artery bypass, or surgery on an artery in your head, neck, heart, or legs? No   3. Do you have chest pain with activity? No   4. Do you have a history of  heart failure? No   5. Do you currently have a cold, bronchitis or symptoms of other infection? No   6. Do you have a cough, shortness of  breath, or wheezing? No   7. Do you or anyone in your family have previous history of blood clots? No   8. Do you or does anyone in your family have a serious bleeding problem such as prolonged bleeding following surgeries or cuts? No   9. Have you ever had problems with anemia or been told to take iron pills? YES - currently on an iron supplement   10. Have you had any abnormal blood loss such as black, tarry or bloody stools, or abnormal vaginal bleeding? No   11. Have you ever had a blood transfusion? No   12. Are you willing to have a blood transfusion if it is medically needed before, during, or after your surgery? Yes   13. Have you or any of your relatives ever had problems with anesthesia? No   14. Do you have sleep apnea, excessive snoring or daytime drowsiness? No   15. Do you have any artifical heart valves or other implanted medical devices like a pacemaker, defibrillator, or continuous glucose monitor? No   16. Do you have artificial joints? No   17. Are you allergic to latex? No   18. Is there any chance that you may be pregnant? No       Health Care Directive  Patient does not have a Health Care Directive or Living Will: Patient states has Advance Directive and will bring in a copy to clinic.    Preoperative Review of    reviewed - no record of controlled substances prescribed.          Patient Active Problem List    Diagnosis Date Noted    Indication for care in labor or delivery 01/17/2014     Priority: Medium      Past Medical History:   Diagnosis Date    KELLEY (generalized anxiety disorder)     Celexa 20 mg daily    IBS (irritable bowel syndrome)      Past Surgical History:   Procedure Laterality Date    ESOPHAGOSCOPY, GASTROSCOPY, DUODENOSCOPY (EGD), COMBINED N/A 9/20/2021    Procedure: ESOPHAGOGASTRODUODENOSCOPY (EGD);  Surgeon: Rashad Barber MD;  [place] [facility] [place]    [place]OPHAGOSCOPY, GASTROSCOPY, DUODENOSCOPY (EGD), COMBINED N/A 12/28/2023    Procedure: ESOPHAGOGASTRODUODENOSCOPY,  WITH BIOPSY;  Surgeon: Sandra Aldrich MD;  [place] [facility] [place]     TOOTH EXTRACTION W/FORCEP  2002    WISDOM TOOTH EXTRACTION       Current Outpatient Medications   Medication Sig Dispense Refill    Apple Cider Vinegar 600 MG CAPS Take 2 capsules by mouth      CALCIUM PO       citalopram (CELEXA) 20 MG tablet Take 1 tablet (20 mg) by mouth daily 90 tablet 3    dicyclomine (BENTYL) 10 MG capsule Take 1 capsule (10 mg) by mouth 4 times daily as needed (abdominal cramping) 60 capsule 3    Digestive Enzymes (DIGESTIVE ENZYME PO)       EPINEPHrine (ANY BX GENERIC EQUIV) 0.3 MG/0.3ML injection 2-pack INJECT 0.3ML (0.3MG) INTO THE SHOULDER,THIGH, OR BUTTOCKS ONCE FOR 1 DOSE      ferrous sulfate dried 160 (50 Fe) MG tablet Take 1 tablet by mouth daily (with breakfast)      levonorgestrel (MIRENA, 52 MG,) 52 MG (20 mcg/day) IUD 1 Device by Intrauterine route      omeprazole (PRILOSEC) 20 MG DR capsule Take 1 capsule (20 mg) by mouth 2 times daily 180 capsule 3    Vitamin D, Cholecalciferol, 25 MCG (1000 UT) CAPS       famotidine (PEPCID) 20 MG tablet Take 1 tablet (20 mg) by mouth 2 times daily as needed (reflux) (Patient not taking: Reported on 1/18/2024) 90 tablet 1       Allergies   Allergen Reactions    Ibuprofen GI Disturbance        Social History     Tobacco Use    Smoking status: Never     Passive exposure: Never    Smokeless tobacco: Never   Substance Use Topics    Alcohol use: No   Rare use    History   Drug Use No         Review of Systems   Constitutional:  Negative for chills and fever.   HENT:  Negative for congestion, ear pain, hearing loss and sore throat.    Eyes:  Negative for pain and visual disturbance.   Respiratory:  Negative for cough and shortness of breath.    Cardiovascular:  Negative for chest pain, palpitations and peripheral edema.   Gastrointestinal:  Negative for abdominal pain, constipation, diarrhea, heartburn, hematochezia and nausea.   Genitourinary:  Negative for dysuria, frequency,  "genital sores, hematuria and urgency.   Musculoskeletal:  Negative for arthralgias, joint swelling and myalgias.   Skin:  Negative for rash.   Neurological:  Negative for dizziness, weakness, headaches and paresthesias.   Psychiatric/Behavioral:  Negative for mood changes. The patient is not nervous/anxious.        Objective    /72   Pulse 61   Temp 97.7  F (36.5  C) (Oral)   Resp 10   Ht 1.695 m (5' 6.75\")   Wt 100.3 kg (221 lb 3.2 oz)   BMI 34.90 kg/m     Estimated body mass index is 34.9 kg/m  as calculated from the following:    Height as of this encounter: 1.695 m (5' 6.75\").    Weight as of this encounter: 100.3 kg (221 lb 3.2 oz).  Physical Exam  GENERAL: alert and no distress  EYES: Eyes grossly normal to inspection, PERRL and conjunctivae and sclerae normal  HENT: ear canals and TM's normal, nose and mouth without ulcers or lesions  NECK: no adenopathy, no asymmetry, masses, or scars  RESP: lungs clear to auscultation - no rales, rhonchi or wheezes  CV: regular rate and rhythm, normal S1 S2, no S3 or S4, no murmur, click or rub, no peripheral edema  ABDOMEN: soft, nontender, no hepatosplenomegaly, no masses and bowel sounds normal  MS: no gross musculoskeletal defects noted, no edema  SKIN: no suspicious lesions or rashes  NEURO: Normal strength and tone, mentation intact and speech normal  PSYCH: mentation appears normal, affect normal/bright  LYMPH: no cervical, supraclavicular, axillary, or inguinal adenopathy        Diagnostics  Labs pending at this time.  Results will be reviewed when available.   No EKG required for low risk surgery (cataract, skin procedure, breast biopsy, etc).    Revised Cardiac Risk Index (RCRI)  The patient has the following serious cardiovascular risks for perioperative complications:   - No serious cardiac risks = 0 points     RCRI Interpretation: 0 points: Class I (very low risk - 0.4% complication rate)         Signed Electronically by: Gregor Yeh MD  Copy " of this evaluation report is provided to requesting physician.         Answers submitted by the patient for this visit:  Patient Health Questionnaire (Submitted on 1/18/2024)  PHQ9 TOTAL SCORE: 0  KELLEY-7 (Submitted on 1/18/2024)  KELLEY 7 TOTAL SCORE: 0  Annual Preventive Visit (Submitted on 1/18/2024)  Chief Complaint: Annual Exam:  Frequency of exercise:: 4-5 days/week  Getting at least 3 servings of Calcium per day:: NO  Diet:: Other  Taking medications regularly:: Yes  Medication side effects:: Lightheadedness, Other  Bi-annual eye exam:: Yes  Dental care twice a year:: Yes  Sleep apnea or symptoms of sleep apnea:: None  Additional concerns today:: Yes  Exercise outside of work (Submitted on 1/18/2024)  Chief Complaint: Annual Exam:  Duration of exercise:: 45-60 minutes

## 2024-01-19 LAB
ANION GAP SERPL CALCULATED.3IONS-SCNC: 11 MMOL/L (ref 7–15)
BUN SERPL-MCNC: 16.1 MG/DL (ref 6–20)
CALCIUM SERPL-MCNC: 9.5 MG/DL (ref 8.6–10)
CHLORIDE SERPL-SCNC: 101 MMOL/L (ref 98–107)
CREAT SERPL-MCNC: 0.98 MG/DL (ref 0.51–0.95)
DEPRECATED HCO3 PLAS-SCNC: 26 MMOL/L (ref 22–29)
EGFRCR SERPLBLD CKD-EPI 2021: 76 ML/MIN/1.73M2
GLUCOSE SERPL-MCNC: 86 MG/DL (ref 70–99)
POTASSIUM SERPL-SCNC: 4 MMOL/L (ref 3.4–5.3)
SODIUM SERPL-SCNC: 138 MMOL/L (ref 135–145)

## 2024-02-08 NOTE — TELEPHONE ENCOUNTER
Critical Care I sent over 30 day supply. I don't see any PHQ9 or GAD7 scores in the chart.  Recommend touching base with PCP for extended prescription renewal.  Lexi Gibbons, DO

## 2024-02-14 SDOH — HEALTH STABILITY: PHYSICAL HEALTH: ON AVERAGE, HOW MANY DAYS PER WEEK DO YOU ENGAGE IN MODERATE TO STRENUOUS EXERCISE (LIKE A BRISK WALK)?: 4 DAYS

## 2024-02-14 SDOH — HEALTH STABILITY: PHYSICAL HEALTH: ON AVERAGE, HOW MANY MINUTES DO YOU ENGAGE IN EXERCISE AT THIS LEVEL?: 40 MIN

## 2024-02-14 ASSESSMENT — SOCIAL DETERMINANTS OF HEALTH (SDOH): HOW OFTEN DO YOU GET TOGETHER WITH FRIENDS OR RELATIVES?: TWICE A WEEK

## 2024-02-21 ENCOUNTER — OFFICE VISIT (OUTPATIENT)
Dept: FAMILY MEDICINE | Facility: CLINIC | Age: 39
End: 2024-02-21
Payer: COMMERCIAL

## 2024-02-21 VITALS
DIASTOLIC BLOOD PRESSURE: 64 MMHG | WEIGHT: 220 LBS | RESPIRATION RATE: 16 BRPM | HEART RATE: 69 BPM | BODY MASS INDEX: 34.53 KG/M2 | SYSTOLIC BLOOD PRESSURE: 112 MMHG | OXYGEN SATURATION: 98 % | HEIGHT: 67 IN | TEMPERATURE: 97.7 F

## 2024-02-21 DIAGNOSIS — K44.9 HIATAL HERNIA: ICD-10-CM

## 2024-02-21 DIAGNOSIS — E66.811 CLASS 1 OBESITY DUE TO EXCESS CALORIES WITH SERIOUS COMORBIDITY AND BODY MASS INDEX (BMI) OF 34.0 TO 34.9 IN ADULT: ICD-10-CM

## 2024-02-21 DIAGNOSIS — K21.9 GASTROESOPHAGEAL REFLUX DISEASE WITHOUT ESOPHAGITIS: ICD-10-CM

## 2024-02-21 DIAGNOSIS — Z00.00 ROUTINE GENERAL MEDICAL EXAMINATION AT A HEALTH CARE FACILITY: Primary | ICD-10-CM

## 2024-02-21 DIAGNOSIS — F41.9 ANXIETY: ICD-10-CM

## 2024-02-21 DIAGNOSIS — E66.09 CLASS 1 OBESITY DUE TO EXCESS CALORIES WITH SERIOUS COMORBIDITY AND BODY MASS INDEX (BMI) OF 34.0 TO 34.9 IN ADULT: ICD-10-CM

## 2024-02-21 PROCEDURE — 99213 OFFICE O/P EST LOW 20 MIN: CPT | Mod: 25 | Performed by: FAMILY MEDICINE

## 2024-02-21 PROCEDURE — 99395 PREV VISIT EST AGE 18-39: CPT | Performed by: FAMILY MEDICINE

## 2024-02-21 RX ORDER — CITALOPRAM HYDROBROMIDE 10 MG/1
10 TABLET ORAL EVERY MORNING
Qty: 90 TABLET | Refills: 3 | Status: SHIPPED | OUTPATIENT
Start: 2024-02-21

## 2024-02-21 RX ORDER — MULTIVITAMIN WITH IRON
1 TABLET ORAL DAILY
COMMUNITY

## 2024-02-21 ASSESSMENT — ANXIETY QUESTIONNAIRES
7. FEELING AFRAID AS IF SOMETHING AWFUL MIGHT HAPPEN: NOT AT ALL
5. BEING SO RESTLESS THAT IT IS HARD TO SIT STILL: NOT AT ALL
8. IF YOU CHECKED OFF ANY PROBLEMS, HOW DIFFICULT HAVE THESE MADE IT FOR YOU TO DO YOUR WORK, TAKE CARE OF THINGS AT HOME, OR GET ALONG WITH OTHER PEOPLE?: NOT DIFFICULT AT ALL
GAD7 TOTAL SCORE: 1
GAD7 TOTAL SCORE: 1
3. WORRYING TOO MUCH ABOUT DIFFERENT THINGS: NOT AT ALL
IF YOU CHECKED OFF ANY PROBLEMS ON THIS QUESTIONNAIRE, HOW DIFFICULT HAVE THESE PROBLEMS MADE IT FOR YOU TO DO YOUR WORK, TAKE CARE OF THINGS AT HOME, OR GET ALONG WITH OTHER PEOPLE: NOT DIFFICULT AT ALL
7. FEELING AFRAID AS IF SOMETHING AWFUL MIGHT HAPPEN: NOT AT ALL
GAD7 TOTAL SCORE: 1
6. BECOMING EASILY ANNOYED OR IRRITABLE: NOT AT ALL
4. TROUBLE RELAXING: SEVERAL DAYS
2. NOT BEING ABLE TO STOP OR CONTROL WORRYING: NOT AT ALL
1. FEELING NERVOUS, ANXIOUS, OR ON EDGE: NOT AT ALL

## 2024-02-21 ASSESSMENT — PATIENT HEALTH QUESTIONNAIRE - PHQ9
10. IF YOU CHECKED OFF ANY PROBLEMS, HOW DIFFICULT HAVE THESE PROBLEMS MADE IT FOR YOU TO DO YOUR WORK, TAKE CARE OF THINGS AT HOME, OR GET ALONG WITH OTHER PEOPLE: NOT DIFFICULT AT ALL
SUM OF ALL RESPONSES TO PHQ QUESTIONS 1-9: 1
SUM OF ALL RESPONSES TO PHQ QUESTIONS 1-9: 1

## 2024-02-21 NOTE — PATIENT INSTRUCTIONS
Preventive Care Advice   This is general advice given by our system to help you stay healthy. However, your care team may have specific advice just for you. Please talk to your care team about your preventive care needs.  Nutrition  Eat 5 or more servings of fruits and vegetables each day.  Try wheat bread, brown rice and whole grain pasta (instead of white bread, rice, and pasta).  Get enough calcium and vitamin D. Check the label on foods and aim for 100% of the RDA (recommended daily allowance).  Lifestyle  Exercise at least 150 minutes each week  (30 minutes a day, 5 days a week).  Do muscle strengthening activities 2 days a week. These help control your weight and prevent disease.  No smoking.  Wear sunscreen to prevent skin cancer.  Have a dental exam and cleaning every 6 months.  Yearly exams  See your health care team every year to talk about:  Any changes in your health.  Any medicines your care team has prescribed.  Preventive care, family planning, and ways to prevent chronic diseases.  Shots (vaccines)   HPV shots (up to age 26), if you've never had them before.  Hepatitis B shots (up to age 59), if you've never had them before.  COVID-19 shot: Get this shot when it's due.  Flu shot: Get a flu shot every year.  Tetanus shot: Get a tetanus shot every 10 years.  Pneumococcal, hepatitis A, and RSV shots: Ask your care team if you need these based on your risk.  Shingles shot (for age 50 and up)  General health tests  Diabetes screening:  Starting at age 35, Get screened for diabetes at least every 3 years.  If you are younger than age 35, ask your care team if you should be screened for diabetes.  Cholesterol test: At age 39, start having a cholesterol test every 5 years, or more often if advised.  Bone density scan (DEXA): At age 50, ask your care team if you should have this scan for osteoporosis (brittle bones).  Hepatitis C: Get tested at least once in your life.  STIs (sexually transmitted  infections)  Before age 24: Ask your care team if you should be screened for STIs.  After age 24: Get screened for STIs if you're at risk. You are at risk for STIs (including HIV) if:  You are sexually active with more than one person.  You don't use condoms every time.  You or a partner was diagnosed with a sexually transmitted infection.  If you are at risk for HIV, ask about PrEP medicine to prevent HIV.  Get tested for HIV at least once in your life, whether you are at risk for HIV or not.  Cancer screening tests  Cervical cancer screening: If you have a cervix, begin getting regular cervical cancer screening tests starting at age 21.  Breast cancer scan (mammogram): If you've ever had breasts, begin having regular mammograms starting at age 40. This is a scan to check for breast cancer.  Colon cancer screening: It is important to start screening for colon cancer at age 45.  Have a colonoscopy test every 10 years (or more often if you're at risk) Or, ask your provider about stool tests like a FIT test every year or Cologuard test every 3 years.  To learn more about your testing options, visit:   https://www.[employer]ion/377126.pdf.  For help making a decision, visit:   https://bit.ly/ct79856.  Prostate cancer screening test: If you have a prostate, ask your care team if a prostate cancer screening test (PSA) at age 55 is right for you.  Lung cancer screening: If you are a current or former smoker ages 50 to 80, ask your care team if ongoing lung cancer screenings are right for you.  For informational purposes only. Not to replace the advice of your health care provider. Copyright   2023 [facility]. All rights reserved. Clinically reviewed by the [facility] Transitions Program. [employer] 897250 - REV 01/24.    Learning About Stress  What is stress?     Stress is your body's response to a hard situation. Your body can have a physical, emotional, or mental response. Stress is a fact of life for  most people, and it affects everyone differently. What causes stress for you may not be stressful for someone else.  A lot of things can cause stress. You may feel stress when you go on a job interview, take a test, or run a race. This kind of short-term stress is normal and even useful. It can help you if you need to work hard or react quickly. For example, stress can help you finish an important job on time.  Long-term stress is caused by ongoing stressful situations or events. Examples of long-term stress include long-term health problems, ongoing problems at work, or conflicts in your family. Long-term stress can harm your health.  How does stress affect your health?  When you are stressed, your body responds as though you are in danger. It makes hormones that speed up your heart, make you breathe faster, and give you a burst of energy. This is called the fight-or-flight stress response. If the stress is over quickly, your body goes back to normal and no harm is done.  But if stress happens too often or lasts too long, it can have bad effects. Long-term stress can make you more likely to get sick, and it can make symptoms of some diseases worse. If you tense up when you are stressed, you may develop neck, shoulder, or low back pain. Stress is linked to high blood pressure and heart disease.  Stress also harms your emotional health. It can make you hart, tense, or depressed. Your relationships may suffer, and you may not do well at work or school.  What can you do to manage stress?  You can try these things to help manage stress:   Do something active. Exercise or activity can help reduce stress. Walking is a great way to get started. Even everyday activities such as housecleaning or yard work can help.  Try yoga or norma chi. These techniques combine exercise and meditation. You may need some training at first to learn them.  Do something you enjoy. For example, listen to music or go to a movie. Practice your  "hobby or do volunteer work.  Meditate. This can help you relax, because you are not worrying about what happened before or what may happen in the future.  Do guided imagery. Imagine yourself in any setting that helps you feel calm. You can use online videos, books, or a teacher to guide you.  Do breathing exercises. For example:  From a standing position, bend forward from the waist with your knees slightly bent. Let your arms dangle close to the floor.  Breathe in slowly and deeply as you return to a standing position. Roll up slowly and lift your head last.  Hold your breath for just a few seconds in the standing position.  Breathe out slowly and bend forward from the waist.  Let your feelings out. Talk, laugh, cry, and express anger when you need to. Talking with supportive friends or family, a counselor, or a roxy leader about your feelings is a healthy way to relieve stress. Avoid discussing your feelings with people who make you feel worse.  Write. It may help to write about things that are bothering you. This helps you find out how much stress you feel and what is causing it. When you know this, you can find better ways to cope.  What can you do to prevent stress?  You might try some of these things to help prevent stress:  Manage your time. This helps you find time to do the things you want and need to do.  Get enough sleep. Your body recovers from the stresses of the day while you are sleeping.  Get support. Your family, friends, and community can make a difference in how you experience stress.  Limit your news feed. Avoid or limit time on social media or news that may make you feel stressed.  Do something active. Exercise or activity can help reduce stress. Walking is a great way to get started.  Where can you learn more?  Go to https://www.healthwise.net/patiented  Enter N032 in the search box to learn more about \"Learning About Stress.\"  Current as of: February 26, 2023               Content Version: " 13.8    1147-4405 [employer].   Care instructions adapted under license by your healthcare professional. If you have questions about a medical condition or this instruction, always ask your healthcare professional. [employer] disclaims any warranty or liability for your use of this information.

## 2024-02-21 NOTE — PROGRESS NOTES
"Preventive Care Visit  [facility]  Dorota DO Thomas, Family Medicine  2024    Assessment & Plan     Routine general medical examination at a health care facility  Counseling  Appropriate preventive services were discussed with this patient, including applicable screening as appropriate for nutrition, physical activity, weight loss and cognition.  Checklist reviewing preventive services available has been given to the patient.  Reviewed patient's diet, addressing concerns and/or questions.     Anxiety  Well-controlled, interested in decreasing Celexa dose from 20 to 10 mg.  Refills provided.  - citalopram (CELEXA) 10 MG tablet  Dispense: 90 tablet; Refill: 3    Gastroesophageal reflux disease without esophagitis  Hiatal hernia  Reviewed 2023 EGD with patient today which revealed normal esophagus, stomach, duodenum, though grade 3 hiatal hernia.  She has been managing her GERD symptoms with omeprazole 20 mg daily.  Plan to perform a surveillance ultrasound around 2025, or sooner with concerns.  Family history significant for father who  from esophageal cancer.  - omeprazole (PRILOSEC) 20 MG DR capsule  - Adult Comprehensive Weight Management  Referral    Class 1 obesity due to excess calories with serious comorbidity and body mass index (BMI) of 34.0 to 34.9 in adult  Body mass index is 34.46 kg/m .  Comorbid conditions include GERD.  Has met with a dietitian and engages in routine exercise with appropriate daily calorie consumption though still struggles to lose weight.  May be interested in medication management for weight loss, referral placed.  - Adult Comprehensive Weight Management  Referral      Patient has been advised of split billing requirements and indicates understanding: Yes      BMI  Estimated body mass index is 34.46 kg/m  as calculated from the following:    Height as of this encounter: 1.702 m (5' 7\").    Weight as of this " encounter: 99.8 kg (220 lb).   Weight management plan: Discussed healthy diet and exercise guidelines        Aleta Sauceda is a 38 year old, presenting for the following:  Physical (Not fasting) and Follow Up (Follow up on health issues, reflux, hernia)    Tco ganglion cyst removal.         2/21/2024     3:27 PM   Additional Questions   Roomed by COLLINS Cain CMA   Accompanied by -        Health Care Directive  Patient does not have a Health Care Directive or Living Will: Patient states has Advance Directive and will bring in a copy to clinic.    HPI        2/14/2024   General Health   How would you rate your overall physical health? Good   Feel stress (tense, anxious, or unable to sleep) To some extent   (!) STRESS CONCERN      2/14/2024   Nutrition   Three or more servings of calcium each day? Yes   Diet: Other   If other, please elaborate: Limiting fresh veg, FOD Map, no fried food, low sugar   How many servings of fruit and vegetables per day? (!) 2-3   How many sweetened beverages each day? 0-1         2/14/2024   Exercise   Days per week of moderate/strenous exercise 4 days   Average minutes spent exercising at this level 40 min         2/14/2024   Social Factors   Frequency of gathering with friends or relatives Twice a week   Worry food won't last until get money to buy more No   Food not last or not have enough money for food? No   Do you have housing?  Yes   Are you worried about losing your housing? No   Lack of transportation? No   Unable to get utilities (heat,electricity)? No         2/14/2024   Dental   Dentist two times every year? Yes         2/14/2024   TB Screening   Were you born outside of [place]?  No       Today's PHQ-9 Score:       2/21/2024     3:28 PM   PHQ-9 SCORE   PHQ-9 Total Score MyChart 1 (Minimal depression)   PHQ-9 Total Score 1         2/14/2024   Substance Use   Alcohol more than 3/day or more than 7/wk No   Do you use any other substances recreationally? No     Social History  "    Tobacco Use    Smoking status: Never     Passive exposure: Never    Smokeless tobacco: Never   Substance Use Topics    Alcohol use: No    Drug use: No           1/18/2024   LAST FHS-7 RESULTS   1st degree relative breast or ovarian cancer No   Any relative bilateral breast cancer No   Any male have breast cancer No   Any woman have breast and ovarian cancer Yes   Any woman with breast cancer before 50yrs Yes   2 or more relatives with breast and/or ovarian cancer No   2 or more relatives with breast and/or bowel cancer No        Mammogram Screening - Patient under 40 years of age: Routine Mammogram Screening not recommended.         2/14/2024   STI Screening   New sexual partner(s) since last STI/HIV test? No     History of abnormal Pap smear: NO - age 30-65 PAP every 5 years with negative HPV co-testing recommended        Latest Ref Rng & Units 3/24/2021    11:11 AM 2/14/2019     2:37 PM 12/3/2015     3:54 PM   PAP / HPV   PAP Negative for squamous intraepithelial lesion or malignancy. Negative for squamous intraepithelial lesion or malignancy  Electronically signed by Malcolm Montague MD on 4/1/2021 at  1:45 PM    Negative for squamous intraepithelial lesion or malignancy  Electronically signed by Bibi Castorena CT (ASCP) on 2/22/2019 at 12:18 PM    Negative for squamous intraepithelial lesion or malignancy  Electronically signed by Kirsten Gould CT (ASCP) on 12/21/2015 at  1:57 PM      HPV 16 DNA NEG Negative  Negative     HPV 18 DNA NEG Negative  Negative     Other HR HPV NEG Negative  Negative             2/14/2024   Contraception/Family Planning   Questions about contraception or family planning No        Reviewed and updated as needed this visit by Provider   Tobacco  Allergies  Meds  Problems  Med Hx  Surg Hx  Fam Hx                   Objective    Exam  /64   Pulse 69   Temp 97.7  F (36.5  C)   Resp 16   Ht 1.702 m (5' 7\")   Wt 99.8 kg (220 lb)   LMP 02/07/2024   SpO2 98% " "  BMI 34.46 kg/m     Estimated body mass index is 34.46 kg/m  as calculated from the following:    Height as of this encounter: 1.702 m (5' 7\").    Weight as of this encounter: 99.8 kg (220 lb).    Physical Exam    Physical Exam:  General: Alert, pleasant, cooperative, NAD  HEENT: NC/AT, EOMI, PERRL, normal conjunctivae and sclerae, MMM, no erythema or lesions in oropharynx, neck supple, no lymphadenopathy, no thyroid enlargement.   CV: RRR, no murmurs, rubs or gallops  Respiratory: normal effort, lungs CTAB with good aeration throughout  Abdomen:  soft,  ND, NT  Extremities: warm, no edema  Psych: Mentation appears normal, affect normal/bright, judgement and insight intact, normal speech and appearance well-groomed  Neuro: A&O x 3, CN II-XII grossly intact, no focal deficits  Skin: warm, no rashes on exposed areas of skin      Signed Electronically by: Dorota Guzman DO    .undefined[^^  Answers submitted by the patient for this visit:  Patient Health Questionnaire (Submitted on 2/21/2024)  If you checked off any problems, how difficult have these problems made it for you to do your work, take care of things at home, or get along with other people?: Not difficult at all  PHQ9 TOTAL SCORE: 1  KELLEY-7 (Submitted on 2/21/2024)  KELLEY 7 TOTAL SCORE: 1    "

## 2024-03-23 DIAGNOSIS — F41.9 ANXIETY: ICD-10-CM

## 2024-03-23 DIAGNOSIS — K21.9 GASTROESOPHAGEAL REFLUX DISEASE WITHOUT ESOPHAGITIS: ICD-10-CM

## 2024-03-25 DIAGNOSIS — K21.9 GASTROESOPHAGEAL REFLUX DISEASE WITHOUT ESOPHAGITIS: ICD-10-CM

## 2024-03-25 RX ORDER — CITALOPRAM HYDROBROMIDE 20 MG/1
20 TABLET ORAL DAILY
Qty: 90 TABLET | Refills: 3 | OUTPATIENT
Start: 2024-03-25

## 2024-04-14 ASSESSMENT — SLEEP AND FATIGUE QUESTIONNAIRES
HOW LIKELY ARE YOU TO NOD OFF OR FALL ASLEEP WHEN YOU ARE A PASSENGER IN A CAR FOR AN HOUR WITHOUT A BREAK: MODERATE CHANCE OF DOZING
HOW LIKELY ARE YOU TO NOD OFF OR FALL ASLEEP IN A CAR, WHILE STOPPED FOR A FEW MINUTES IN TRAFFIC: WOULD NEVER DOZE
HOW LIKELY ARE YOU TO NOD OFF OR FALL ASLEEP WHILE SITTING INACTIVE IN A PUBLIC PLACE: SLIGHT CHANCE OF DOZING
HOW LIKELY ARE YOU TO NOD OFF OR FALL ASLEEP WHILE SITTING AND READING: HIGH CHANCE OF DOZING
HOW LIKELY ARE YOU TO NOD OFF OR FALL ASLEEP WHILE WATCHING TV: SLIGHT CHANCE OF DOZING
HOW LIKELY ARE YOU TO NOD OFF OR FALL ASLEEP WHILE SITTING QUIETLY AFTER LUNCH WITHOUT ALCOHOL: SLIGHT CHANCE OF DOZING
HOW LIKELY ARE YOU TO NOD OFF OR FALL ASLEEP WHILE LYING DOWN TO REST IN THE AFTERNOON WHEN CIRCUMSTANCES PERMIT: HIGH CHANCE OF DOZING
HOW LIKELY ARE YOU TO NOD OFF OR FALL ASLEEP WHILE SITTING AND TALKING TO SOMEONE: WOULD NEVER DOZE

## 2024-04-14 NOTE — PROGRESS NOTES
"New Medical Weight Management Consult    PATIENT:  Komal Parra  MRN:         [id number]  :         1985  DALI:         4/15/2024        I had the pleasure of seeing your patient, Komal Parra. Full intake/assessment was done to determine barriers to weight loss success and develop a treatment plan. Komal Parra is a 38 year old female interested in treatment of medical problems associated with excess weight. She has a height of 5' 7\", a weight of 216 lbs 0 oz, and the calculated Body mass index is 33.83 kg/m .  She was referred by her PCP 24 for help with her Class I obesity.    Komal is a patient with early onset class I obesity with significant element of familial/genetic influence and without current health consequences beyond the health risks of Class I obesity as she ages. She does need aggressive weight loss plan due to feeling limited in her health.  Komal Parra uses food as mood management, has perception of intense hunger, and has a disorganized meal pattern.      Her problem is complicated by a hunger disorder, strong craving/reward pathways, gender and short stature, impaired metabolic perception, and balancing mindful diet in the face of high mileage runner (training for half marathon in mid , up to about 25 miles weekly now).       Review of the patient's history and habits today suggest that weight gain has been  intermittently problematic in the last 5 years with some brief but non durable control.    Previous Interventions found to be helpful in the past for weight loss include mindful diet/exercise combination but harder to see results as she's aging and having less self care time..    We discussed a toolbox approach to weight management today and she is open to combining mindful, reduced calorie dietary therapy with increased mindfulness techniques, activity/exercise improvements to optimize their current and future health.  Medication assistance for " appetite control was discussed today and on review of the risks/benefits for this patients health history, we've decided to start with appetite suppressant therapy, mindful of the high mileage she's putting in for her race. As such, we'll hold off on injectable therapies for now and ramp up low dose phntermine, Lomaira 8mg.    ASSESSMENT AND PLAN  Problem List Items Addressed This Visit    None  Visit Diagnoses       Class 1 obesity due to excess calories with body mass index (BMI) of 33.0 to 33.9 in adult, unspecified whether serious comorbidity present    -  Primary    Relevant Medications    phentermine (LOMAIRA) 8 MG tablet    Other Relevant Orders    Comprehensive metabolic panel (Completed)           Plan:  Welcome to your weight loss season. Aiming for 1776kcal/day and 70-75 grams of lean protein. Increase mindful hydration, aiming for 80 oz/day and as needed on longer runs to replace sweat and electrolyte losses.     2. While training for half marathon the next 2 months, we'll avoid injectable therapies and try low dose phentermine 8mg with lunch.  You can increase to twice daily if needed or shift your phentermine to pre supper if it doesn't affect your ability to fall asleep.    We'll consider compounded semaglutide after your race.    3. Check labs.    4. Follow up with dietician, working with [facility], but I'd recommend one visit with ours at least to focus on weight management in the context of your other health goals at [facility].    5. Exercise is heavy right now and as that reduces after event, let me know what your routine is for your new goal intake levels.    6. Track water and food/protein intake until you know what those 1700-1800kcal/day and 80 oz of water looks/feels like.        60 minutes spent by me on the date of the encounter doing chart review, history and exam, documentation and further activities per the note        She has the following co-morbidities:        4/14/2024    12:50  "PM   --   I have the following health issues associated with obesity GERD (Reflux)   I have the following symptoms associated with obesity None of the above           4/14/2024    12:50 PM   Patient Goals   If yes, please indicate which surgery? Hiatial hernia repair           4/14/2024    12:50 PM   Referring Provider   Please name the provider who referred you to Medical Weight Management  If you do not know, please answer \"I Don't Know\" Dorota Guzman           4/14/2024    12:50 PM   Weight History   How concerned are you about your weight? Somewhat Concerned   I became overweight After Pregnancy   The following factors have contributed to my weight gain Other   Please list the other factors I don't know why   I have tried the following methods to lose weight Watching Portions or Calories    Exercise    Weight Watchers    Meal Replacements    Fasting   My lowest weight since age 19 was 110   My highest weight since age 19 was 220   The most weight I have ever lost was (lbs) 8   I have the following family history of obesity/being overweight I am the only one in my immediate family who is overweight   How has your weight changed over the last year? Gained   How many pounds? 5   Workouts 5 days weekly: running now (previous marathon/half marathon), 5-8 miles. Training for [employer] half marathon.   Circuit training 2 days weekly at the gym.     Has food journal, doing elimination diet wheat/corn/dairy and nightshades. Wheat is the worse trigger for her.   Eating disorder in her early 20s.   Working now at [employer], . Day job, not yet traveling.   Kids are 10 and 13 yo. .         4/14/2024    12:50 PM   Diet Recall Review with Patient   If you do eat breakfast, what types of food do you eat? Eggs, toast   If you do eat lunch, what types of food do you typically eat? Salad, sandwich   If you do eat supper, what types of food do you typically eat? Chicken, veggies, spaghetti, stir smith, " etc   If you do snack, what types of food do you typically eat? Granola bars, popcorn, trail mix, fruit   How many glasses of juice do you drink in a typical day? 0   How many of glasses of milk do you drink in a typical day? 0   How many 8oz glasses of sugar containing drinks such as Patrick-Aid/sweet tea do you drink in a day? 0   How many cans/bottles of sugar pop/soda/tea/sports drinks do you drink in a day? 0   How many cans/bottles of diet pop/soda/tea or sports drink do you drink in a day? 0   How often do you have a drink of alcohol? Monthly or Less   If you do drink, how many drinks might you have in a day? 1 or 2           4/14/2024    12:50 PM   Eating Habits   Generally, my meals include foods like these bread, pasta, rice, potatoes, corn, crackers, sweet dessert, pop, or juice A Few Times a Week   Generally, my meals include foods like these fried meats, brats, burgers, french fries, pizza, cheese, chips, or ice cream Less Than Weekly   Eat fast food (like McDonalds, Burger [employer]dez, Taco Bell) Less Than Weekly   Eat at a buffet or sit-down restaurant Less Than Weekly   Eat most of my meals in front of the TV or computer Once a Week   Often skip meals, eat at random times, have no regular eating times Never   Rarely sit down for a meal but snack or graze throughout A Few Times a Week   Eat extra snacks between meals Once a Week   Eat most of my food at the end of the day Never   Eat in the middle of the night or wake up at night to eat Never   Eat extra snacks to prevent or correct low blood sugar Less Than Weekly   Eat to prevent acid reflux or stomach pain A Few Times a Week   Worry about not having enough food to eat Never   I eat when I am depressed Never   I eat when I am stressed Less Than Weekly   I eat when I am bored Once a Week   I eat when I am anxious Once a Week   I eat when I am happy or as a reward Once a Week   I feel hungry all the time even if I just have eaten Never   Feeling full is  important to me Less Than Weekly   I finish all the food on my plate even if I am already full Once a Week   I can't resist eating delicious food or walk past the good food/smell Less Than Weekly   I eat/snack without noticing that I am eating Less Than Weekly   I eat when I am preparing the meal A Few Times a Week   I eat more than usual when I see others eating Less Than Weekly   I have trouble not eating sweets, ice cream, cookies, or chips if they are around the house Less Than Weekly   I think about food all day Once a Week   What foods, if any, do you crave? Cheese           4/14/2024    12:50 PM   Amount of Food   I feel out of control when eating Never   I eat a large amount of food, like a loaf of bread, a box of cookies, a pint/quart of ice cream, all at once Never   I eat a large amount of food even when I am not hungry Never   I eat rapidly Weekly   I eat alone because I feel embarrassed and do not want others to see how much I have eaten Never   I eat until I am uncomfortably full Never   I feel bad, disgusted, or guilty after I overeat Never           4/14/2024    12:50 PM   Activity/Exercise History   How much of a typical 12 hour day do you spend sitting? Half the Day   How much of a typical 12 hour day do you spend lying down? Less Than Half the Day   How much of a typical day do you spend walking/standing? Half the Day   How many hours (not including work) do you spend on the TV/Video Games/Computer/Tablet/Phone? 2-3 Hours   How many times a week are you active for the purpose of exercise? 4-5 TImes a Week   How many total minutes do you spend doing some activity for the purpose of exercising when you exercise? More Than 30 Minutes       PAST MEDICAL HISTORY:  Past Medical History:   Diagnosis Date    KELLEY (generalized anxiety disorder)     Celexa 20 mg daily    IBS (irritable bowel syndrome)            4/14/2024    12:50 PM   Work/Social History Reviewed With Patient   My employment status is  Full-Time   My job is    How much of your job is spent on the computer or phone? 100%   How many hours do you spend commuting to work daily? 0   What is your marital status? /In a Relationship   If in a relationship, is your significant other overweight? No   If you have children, are they overweight? No   Who do you live with? , kids   Who does the food shopping? Me           4/14/2024    12:50 PM   Mental Health History Reviewed With Patient   Have you ever been physically or sexually abused? No   How often in the past 2 weeks have you felt little interest or pleasure in doing things? Not at all   Over the past 2 weeks how often have you felt down, depressed, or hopeless? Not at all           4/14/2024    12:50 PM   Sleep History Reviewed With Patient   How many hours do you sleep at night? 7       Past Surgical History:   Procedure Laterality Date    BIOPSY  Dec 2034    Endoscopy    ESOPHAGOSCOPY, GASTROSCOPY, DUODENOSCOPY (EGD), COMBINED N/A 09/20/2021    Procedure: ESOPHAGOGASTRODUODENOSCOPY (EGD);  Surgeon: Rashad Barber MD;  [place] [facility] [place]    ESOPHAGOSCOPY, GASTROSCOPY, DUODENOSCOPY (EGD), COMBINED N/A 12/28/2023    Procedure: ESOPHAGOGASTRODUODENOSCOPY, WITH BIOPSY;  Surgeon: Sandra Aldrich MD;  [place] [facility] [place]    HC TOOTH EXTRACTION W/FORCEP  01/01/2002    SOFT TISSUE SURGERY  2/1/24    Ganglion cyst    WISDOM TOOTH EXTRACTION         Social History     Socioeconomic History    Marital status:      Spouse name: Not on file    Number of children: Not on file    Years of education: Not on file    Highest education level: Not on file   Occupational History    Not on file   Tobacco Use    Smoking status: Never     Passive exposure: Never    Smokeless tobacco: Never   Substance and Sexual Activity    Alcohol use: No    Drug use: No    Sexual activity: Yes     Partners: Male     Birth control/protection: I.U.D., Male Surgical   Other Topics Concern     Parent/sibling w/ CABG, MI or angioplasty before 65F 55M? No   Social History Narrative    Not on file     Social Determinants of Health     Financial Resource Strain: Low Risk  (2/14/2024)    Financial Resource Strain     Within the past 12 months, have you or your family members you live with been unable to get utilities (heat, electricity) when it was really needed?: No   Food Insecurity: Low Risk  (2/14/2024)    Food Insecurity     Within the past 12 months, did you worry that your food would run out before you got money to buy more?: No     Within the past 12 months, did the food you bought just not last and you didn t have money to get more?: No   Transportation Needs: Low Risk  (2/14/2024)    Transportation Needs     Within the past 12 months, has lack of transportation kept you from medical appointments, getting your medicines, non-medical meetings or appointments, work, or from getting things that you need?: No   Physical Activity: Sufficiently Active (2/14/2024)    Exercise Vital Sign     Days of Exercise per Week: 4 days     Minutes of Exercise per Session: 40 min   Stress: Stress Concern Present (2/14/2024)    [employer] - Occupational Stress Questionnaire     Feeling of Stress : To some extent   Social Connections: Unknown (2/14/2024)    Social Connection and Isolation Panel [NHANES]     Frequency of Communication with Friends and Family: Not on file     Frequency of Social Gatherings with Friends and Family: Twice a week     Attends Latter day Services: Not on file     Active Member of Clubs or Organizations: Not on file     Attends Club or Organization Meetings: Not on file     Marital Status: Not on file   Interpersonal Safety: Low Risk  (1/18/2024)    Interpersonal Safety     Do you feel physically and emotionally safe where you currently live?: Yes     Within the past 12 months, have you been hit, slapped, kicked or otherwise physically hurt by someone?: No     Within  the past 12 months, have you been humiliated or emotionally abused in other ways by your partner or ex-partner?: No   Housing Stability: Low Risk  (2/14/2024)    Housing Stability     Do you have housing? : Yes     Are you worried about losing your housing?: No       MEDICATIONS:   Current Outpatient Medications   Medication Sig Dispense Refill    Apple Cider Vinegar 600 MG CAPS Take 2 capsules by mouth      CALCIUM PO       citalopram (CELEXA) 10 MG tablet Take 1 tablet (10 mg) by mouth every morning 90 tablet 3    Digestive Enzymes (DIGESTIVE ENZYME PO)       EPINEPHrine (ANY BX GENERIC EQUIV) 0.3 MG/0.3ML injection 2-pack INJECT 0.3ML (0.3MG) INTO THE SHOULDER,THIGH, OR BUTTOCKS ONCE FOR 1 DOSE      ferrous sulfate dried 160 (50 Fe) MG tablet Take 1 tablet by mouth daily (with breakfast)      levonorgestrel (MIRENA, 52 MG,) 52 MG (20 mcg/day) IUD 1 Device by Intrauterine route      omeprazole (PRILOSEC) 20 MG DR capsule TAKE 1 CAPSULE BY MOUTH TWICE A  capsule 2    phentermine (LOMAIRA) 8 MG tablet Take 1 tablet (8 mg) by mouth daily (with lunch) for 180 days Start one tablet with lunch for 2 weeks then increase if tolerated to one tablet with lunch and one tablet prior to supper (5 hours prior to bedtime minimum). 90 tablet 1    vitamin C with B complex (B COMPLEX-C) tablet Take 1 tablet by mouth daily      Vitamin D, Cholecalciferol, 25 MCG (1000 UT) CAPS       dicyclomine (BENTYL) 10 MG capsule Take 1 capsule (10 mg) by mouth 4 times daily as needed (abdominal cramping) 60 capsule 3       ALLERGIES:   Allergies   Allergen Reactions    Ibuprofen GI Disturbance     TSH   Date Value Ref Range Status   02/15/2019 1.99 0.30 - 5.00 uIU/mL Final     Last Comprehensive Metabolic Panel:  Sodium   Date Value Ref Range Status   04/15/2024 141 135 - 145 mmol/L Final     Comment:     Reference intervals for this test were updated on 09/26/2023 to more accurately reflect our healthy population. There may be  differences in the flagging of prior results with similar values performed with this method. Interpretation of those prior results can be made in the context of the updated reference intervals.      Potassium   Date Value Ref Range Status   04/15/2024 4.3 3.4 - 5.3 mmol/L Final   03/24/2021 4.2 3.5 - 5.0 mmol/L Final     Chloride   Date Value Ref Range Status   04/15/2024 107 98 - 107 mmol/L Final   03/24/2021 108 (H) 98 - 107 mmol/L Final     Carbon Dioxide (CO2)   Date Value Ref Range Status   04/15/2024 24 22 - 29 mmol/L Final   03/24/2021 24 22 - 31 mmol/L Final     Anion Gap   Date Value Ref Range Status   04/15/2024 10 7 - 15 mmol/L Final   03/24/2021 8 5 - 18 mmol/L Final     Glucose   Date Value Ref Range Status   04/15/2024 80 70 - 99 mg/dL Final   03/24/2021 73 70 - 125 mg/dL Final     Urea Nitrogen   Date Value Ref Range Status   04/15/2024 11.7 6.0 - 20.0 mg/dL Final   03/24/2021 11 8 - 22 mg/dL Final     Creatinine   Date Value Ref Range Status   04/15/2024 0.87 0.51 - 0.95 mg/dL Final     GFR Estimate   Date Value Ref Range Status   04/15/2024 88 >60 mL/min/1.73m2 Final   03/24/2021 >60 >60 mL/min/1.73m2 Final     Calcium   Date Value Ref Range Status   04/15/2024 9.1 8.6 - 10.0 mg/dL Final     Bilirubin Total   Date Value Ref Range Status   04/15/2024 0.5 <=1.2 mg/dL Final     Alkaline Phosphatase   Date Value Ref Range Status   04/15/2024 47 40 - 150 U/L Final     Comment:     Reference intervals for this test were updated on 11/14/2023 to more accurately reflect our healthy population. There may be differences in the flagging of prior results with similar values performed with this method. Interpretation of those prior results can be made in the context of the updated reference intervals.     ALT   Date Value Ref Range Status   04/15/2024 15 0 - 50 U/L Final     Comment:     Reference intervals for this test were updated on 6/12/2023 to more accurately reflect our healthy population. There may be  differences in the flagging of prior results with similar values performed with this method. Interpretation of those prior results can be made in the context of the updated reference intervals.       AST   Date Value Ref Range Status   04/15/2024 23 0 - 45 U/L Final     Comment:     Reference intervals for this test were updated on 6/12/2023 to more accurately reflect our healthy population. There may be differences in the flagging of prior results with similar values performed with this method. Interpretation of those prior results can be made in the context of the updated reference intervals.             Lab Results   Component Value Date    A1C 5.2 12/03/2015     Recent Labs   Lab Test 03/24/21  1120   CHOL 151   HDL 51   LDL 86   TRIG 71      No recent labs.  /28/23 looked pretty normal upon review of report:  Linked Documents    View Image     Component Collected Lab   Upper GI Endoscopy 12/28/2023 12:05 PM [facility]   [facility]  [address] [place], [place] [address]., [place] [address] [phone]     Endoscopy Department  _______________________________________________________________________________  Patient Name: Komal Parra           Procedure Date: 12/28/2023 12:05 PM  MRN: [id number]                       Account Number: [id number]  YOB: 1985              Admit Type: Outpatient  Age: 38                               Room: [facility] PROCEDURE ROOM 05  Gender: Female                        Note Status: Finalized  Attending MD: JANIE PEREZ MD,      Pause for the Cause: Pause for the cause   completed.  Total Sedation Time:                    _______________________________________________________________________________     Procedure:             Upper GI endoscopy  Indications:           Heartburn  Providers:             JANIE PEREZ MD, Lilly Menendez RN, Kiara Hunter RN, Viola Gomez, KIAN  Patient Profile:       This is a 38 year old female. Refer to  note in patient                         chart for documentation of history and physical.  Referring MD:          SUDHEER ROONEY  Medicines:             Monitored Anesthesia Care  Complications:         No immediate complications. Estimated blood loss:                         Minimal.  _______________________________________________________________________________  Procedure:             Pre-Anesthesia Assessment:                         - Prior to the procedure, a History and Physical was                         performed, and patient medications and allergies were                         reviewed. The patient is competent. The risks and                         benefits of the procedure and the sedation options and                         risks were discussed with the patient. All questions                         were answered and informed consent was obtained.                         Patient identification and proposed procedure were                         verified by the physician and the nurse in the                         pre-procedure area. Mental Status Examination: alert                         and oriented. Airway Examination: normal oropharyngeal                         airway and neck mobility. Respiratory Examination:                         clear to auscultation. CV Examination: normal.                         Prophylactic Antibiotics: The patient does not require                         prophylactic antibiotics. Prior Anticoagulants: The                         patient has taken no anticoagulant or antiplatelet                         agents. ASA Grade Assessment: II - A patient with mild                         systemic disease. After reviewing the risks and                         benefits, the patient was deemed in satisfactory                         condition to undergo the procedure. The anesthesia                         plan was to use monitored anesthesia care (MAC).                          Immediately prior to administration of medications,                         the patient was re-assessed for adequacy to receive                         sedatives. The heart rate, respiratory rate, oxygen                         saturations, blood pressure, adequacy of pulmonary                         ventilation, and response to care were monitored                         throughout the procedure. The physical status of the                         patient was re-assessed after the procedure.                         After obtaining informed consent, the endoscope was                         passed under direct vision. Throughout the procedure,                         the patient's blood pressure, pulse, and oxygen                         saturations were monitored continuously. The Endoscope                         was introduced through the mouth, and advanced to the                         third part of duodenum. The upper GI endoscopy was                         accomplished without difficulty. The patient tolerated                         the procedure well.                                                                                   Findings:       The Z-line was regular and was found 37 cm from the incisors.       Normal mucosa was found in the mid esophagus and in the distal       esophagus. Biopsies were taken with a cold forceps for histology.       The gastroesophageal flap valve was visualized endoscopically and       classified as Hill Grade III (minimal fold, loose to endoscope, hiatal       hernia likely).       The entire examined stomach was normal.       The cardia and gastric fundus were normal on retroflexion.       The examined duodenum was normal.                                                                                   Impression:            - Z-line regular, 37 cm from the incisors.                         - Normal mucosa was found in the mid esophagus and in                          the distal esophagus. Biopsied.                         - Gastroesophageal flap valve classified as Hill Grade                         III (minimal fold, loose to endoscope, hiatal hernia                         likely).                         - Normal stomach.                         - Normal examined duodenum.  Recommendation:        - Await pathology results.                         - Discharge patient to home (with escort).                         - Resume previous diet.                         - Continue present medications.                         - Await pathology results.                         - Return to primary care physician as previously                         scheduled.                                                                                     Electronically Signed by: Dr. Janie Aldrich  ________________  JANIE ALDRICH MD  12/28/2023 1:12:41 PM  I was physically present for the entire viewing portion of the exam.  __________________________     Esophageal biopsy pathology was normal:  Surgical Pathology Exam: [id number]  Order: [id number]  Collected 12/28/2023  1:07 PM       Status: Final result       Visible to patient: Yes (seen)    0 Result Notes       1 Patient Communication       Component  Resulting Agency   Case Report   Surgical Pathology Report                         Case: [id number]                                   Authorizing Provider:  Janie Aldrich MD          Collected:           12/28/2023 01:07 PM           Ordering Location:     [facility] OR  Received:            12/28/2023 01:26 PM                                  [place]                                                                   Pathologist:           Jorge Trujillo DO                                                             Specimens:   A) - Esophagus, Distal, distal esophagus biopsies                                                    B) - Esophagus, Mid, mid esophagus biopsies               "                                  Final Diagnosis   A.  ESOPHAGUS, DISTAL, BIOPSY:  - Unremarkable squamous mucosa  - No evidence of eosinophilic esophagitis     B.  ESOPHAGUS, MID, BIOPSY:  - Unremarkable squamous mucosa  - No evidence of eosinophilic esophagitis                   PHYSICAL EXAM:  Objective    /66   Temp 97.8  F (36.6  C) (Oral)   Ht 1.702 m (5' 7\")   Wt 98 kg (216 lb)   LMP 02/07/2024   BMI 33.83 kg/m    /66   Temp 97.8  F (36.6  C) (Oral)   Ht 1.702 m (5' 7\")   Wt 98 kg (216 lb)   LMP 02/07/2024   BMI 33.83 kg/m    Body mass index is 33.83 kg/m .  Physical Exam   GENERAL: alert and no distress  NECK: no adenopathy, no asymmetry, masses, or scars  RESP: lungs clear to auscultation - no rales, rhonchi or wheezes  CV: regular rate and rhythm, normal S1 S2, no S3 or S4, no murmur, click or rub, no peripheral edema  ABDOMEN: soft, nontender, no hepatosplenomegaly, no masses and bowel sounds normal  MS: no gross musculoskeletal defects noted, no edema        Sincerely,    Bradly James MD          "

## 2024-04-15 ENCOUNTER — OFFICE VISIT (OUTPATIENT)
Dept: SURGERY | Facility: CLINIC | Age: 39
End: 2024-04-15
Payer: COMMERCIAL

## 2024-04-15 ENCOUNTER — LAB (OUTPATIENT)
Dept: LAB | Facility: CLINIC | Age: 39
End: 2024-04-15
Payer: COMMERCIAL

## 2024-04-15 VITALS
TEMPERATURE: 97.8 F | BODY MASS INDEX: 33.9 KG/M2 | HEIGHT: 67 IN | SYSTOLIC BLOOD PRESSURE: 114 MMHG | DIASTOLIC BLOOD PRESSURE: 66 MMHG | WEIGHT: 216 LBS

## 2024-04-15 DIAGNOSIS — E66.09 CLASS 1 OBESITY DUE TO EXCESS CALORIES WITH BODY MASS INDEX (BMI) OF 33.0 TO 33.9 IN ADULT, UNSPECIFIED WHETHER SERIOUS COMORBIDITY PRESENT: ICD-10-CM

## 2024-04-15 DIAGNOSIS — E66.811 CLASS 1 OBESITY DUE TO EXCESS CALORIES WITH BODY MASS INDEX (BMI) OF 33.0 TO 33.9 IN ADULT, UNSPECIFIED WHETHER SERIOUS COMORBIDITY PRESENT: ICD-10-CM

## 2024-04-15 DIAGNOSIS — E66.811 CLASS 1 OBESITY DUE TO EXCESS CALORIES WITH BODY MASS INDEX (BMI) OF 33.0 TO 33.9 IN ADULT, UNSPECIFIED WHETHER SERIOUS COMORBIDITY PRESENT: Primary | ICD-10-CM

## 2024-04-15 DIAGNOSIS — E66.09 CLASS 1 OBESITY DUE TO EXCESS CALORIES WITH BODY MASS INDEX (BMI) OF 33.0 TO 33.9 IN ADULT, UNSPECIFIED WHETHER SERIOUS COMORBIDITY PRESENT: Primary | ICD-10-CM

## 2024-04-15 PROBLEM — F41.1 GENERALIZED ANXIETY DISORDER: Status: ACTIVE | Noted: 2024-04-15

## 2024-04-15 PROCEDURE — 80053 COMPREHEN METABOLIC PANEL: CPT

## 2024-04-15 PROCEDURE — 36415 COLL VENOUS BLD VENIPUNCTURE: CPT

## 2024-04-15 PROCEDURE — 99205 OFFICE O/P NEW HI 60 MIN: CPT | Performed by: EMERGENCY MEDICINE

## 2024-04-15 ASSESSMENT — SLEEP AND FATIGUE QUESTIONNAIRES
HOW LIKELY ARE YOU TO NOD OFF OR FALL ASLEEP WHILE SITTING AND READING: HIGH CHANCE OF DOZING
HOW LIKELY ARE YOU TO NOD OFF OR FALL ASLEEP WHILE SITTING QUIETLY AFTER LUNCH WITHOUT ALCOHOL: SLIGHT CHANCE OF DOZING
HOW LIKELY ARE YOU TO NOD OFF OR FALL ASLEEP IN A CAR, WHILE STOPPED FOR A FEW MINUTES IN TRAFFIC: WOULD NEVER DOZE
HOW LIKELY ARE YOU TO NOD OFF OR FALL ASLEEP WHILE WATCHING TV: SLIGHT CHANCE OF DOZING
HOW LIKELY ARE YOU TO NOD OFF OR FALL ASLEEP WHILE LYING DOWN TO REST IN THE AFTERNOON WHEN CIRCUMSTANCES PERMIT: HIGH CHANCE OF DOZING
HOW LIKELY ARE YOU TO NOD OFF OR FALL ASLEEP WHILE SITTING INACTIVE IN A PUBLIC PLACE: SLIGHT CHANCE OF DOZING
HOW LIKELY ARE YOU TO NOD OFF OR FALL ASLEEP WHEN YOU ARE A PASSENGER IN A CAR FOR AN HOUR WITHOUT A BREAK: MODERATE CHANCE OF DOZING
HOW LIKELY ARE YOU TO NOD OFF OR FALL ASLEEP WHILE SITTING AND TALKING TO SOMEONE: WOULD NEVER DOZE

## 2024-04-15 NOTE — PATIENT INSTRUCTIONS
"Plan:  Welcome to your weight loss season. Aiming for 1776kcal/day and 70-75 grams of lean protein. Increase mindful hydration, aiming for 80 oz/day and as needed on longer runs to replace sweat and electrolyte losses.     2. While training for half marathon the next 2 months, we'll avoid injectable therapies and try low dose phentermine 8mg with lunch.  You can increase to twice daily if needed or shift your phentermine to pre supper if it doesn't affect your ability to fall asleep.    We'll consider compounded semaglutide after your race.    3. Check labs.    4. Follow up with evie, working with Annamarie Juarez, but I'd recommend one visit with ours at least to focus on weight management in the context of your other health goals at [facility].    5. Exercise is heavy right now and as that reduces after event, let me know what your routine is for your new goal intake levels.    6. Track water and food/protein intake until you know what those 1700-1800kcal/day and 80 oz of water looks/feels like.        What makes a person succeed with dramatic and sustained weight loss?    It's being at the right point in your life where you feel the need to lose the weight, not because anyone told you so but because of a voice inside of you that says, \"I am ready for this\".  You're now at a point where you may be feeling anxious, irritable and when you look in the mirror you do not recognize the person looking back.  Your healthy self is buried somewhere in that reflexion and you want to free it again.  This is the sort of motivation that leads to success, and it comes from you.    Because the only person that can lose that extra weight is you, I like my patients to focus on the mindset of being in Weight Loss Season.  This gives you permission to make the changes necessary to be consistent with the diet/activity and behavior changes that lead to successful, healthy weight loss.  Nearly any diet plan can work for weight loss, " "but keeping it healthy and nutrient based to prevent deficiencies/hair loss/fatigue or irritability is vital.  If you have a plan you want to try, we'll work with you to make sure no adjustments are needed to keep you healthy through your weight loss season and working with our Bariatric Dieticians you'll be given expert guidance to customize your diet plan to suit your particular needs. If you don't have your own ideas in mind, we are always happy to suggest well researched and validated plans that provide enough food to prevent hunger but still tap into your excess fat reserves and lose weight in a sustainable fashion.  There is great evidence that lean protein/healthy fat intake with good fiber intake while minimizing simple starches/carbs produces reliable/sustainable weight loss in most people. But some feel more connected to an intermittent fasting/fast mimicking or ketogenic diet.  These protocols can be hard for many to stick with and that's why we prefer the protein/healthy fat focused diets but if these alternative strategies appeal to you, we can work with you to optimize your knowledge and results with these tools.    Losing weight is a temporary commitment, but you need to be \"All In\" to have a good weight loss season.  To avoid frustration, you have to be willing to be on track 19 out of 20 days or even better than that. But, weight loss season is generally only 4-8 months in length. After that length of time, it can be hard to maintain a negative calorie balance and our brain, motivations and metabolism will usually bring you to a plateau that cannot be broken in this modern world where other commitments start to take priority. That's when we look to stabilize the weight loss you've achieved.  If you've reached your goal by that time, fantastic, and job well done.  If there is more to go, then after a few months of stabilization, we can usually attack that previous plateau and break into new " "territory.    Because of this time limitation, we want to really get to work right away and get into a sustainable routine ASAP.  One of the best predictors of how much weight you're going to lose throughout the season is how much you lose in the first 6 weeks, so prepare well and jump in with both feet.      Occasionally, people may feel like they cannot commit fully to the changes necessary and may want to change one thing at a time and \"get used to\" the idea of losing weight.  That is OK because that is where they are in their life, and they cannot fully commit for any number of reasons.  It's part of that internal motivation and they just haven't reached PRIORTY NUMBER ONE status yet. It's possible that what they need is more time to reach that point and I am always willing to work with people that want to \"dabble\", but understand, the amount of success obtained with half measures, is much less than half results. Behavior Change cannot occur until we prepare our minds, bodies and environment for what is to come, action!    As you go through your plan, look for things to keep your motivation rolling.  The most successful people have a goal or target/reward that they are working towards.  Having a reward that celebrates your new fitness, mobility and energy is the best sort because it will encourage you to do well with the weight maintenance phase and long term lifestyle changes that promotes keeping the weight off for the long term.  Usually, \"getting healthier\" or improving blood tests or losing weight so your clothes fit better is not as internally motivating as having a tangible reward.  A good weight loss season reward is one that isn't food based, is affordable, but something special:  Something you won't be getting/doing unless you achieve your goal.   It s important to keep to the rule of success:  in order to get the reward, your goal MUST be achieved. Write this reward down, where you can look at it " daily and keep it in the front of your mind as you go through your weight loss season and it will help keep you on track.    Tools that help change behavior are vital for success. The most studied and most supported tool for weight loss is nothing more than writing down your food and weight every day.  Every Day.  Accurately and completely.  When you commit to weight loss season, this information tells you whether you're getting ENOUGH food to fuel your weight loss properly as well as teaches you the interaction between different foods you eat and how your body responds with weight loss.  You'll see that sometimes after a heavy workout you don't see the scale move until 2-3 days later.  How saltier meals (chili for example) may make you retain water for 4-5 days before you see the weight come off, you'll get used to the mini-plateaus that develop after a good 3-8 lb drop in weight as well as how you break through if you keep working the diet as you should.    Weight loss is not a linear process, there are mini ups and downs.  Learning how your body loses weight and getting comfortable with that is very rewarding. The act of writing words on paper also solidifies your will power and commitment to the season of weight loss and that by itself changes your brain chemistry/appetite, motivation and prepares you for maximal success.     Behavior change is all about getting into a new routine.  The old habits and routine have to change because without changing the circumstances of how you gained your weight, it's unlikely you'll enjoy satisfying results. If you have snacking habits, like every time you walk through the kitchen you grab a little something, well, that habit has to change and be replaced by a new habit.  It can be something as simple as keeping a doodle pad on the counter that you make a few scribbles and then walk through the kitchen having not opened the cupboard, or starting with a glass of water and  "leaving the kitchen without anything else, or checking your food journal to see how many calories you have left for the day.  Boredom is the enemy as are the old habits. Break new ground and try to push those old habits into a deep hole.  There are apps/counseling options available that can help with some of the day to day urges/behaviors if you're struggling. One commercial product that does a good job is Noom.  Unfortunately, there is a subscription fee.    Finally, exercise always helps.  While not mandatory to lose weight, every little bit helps and exercise has so many other benefits that to not work it into your plan is to miss out on all the mood, sleep, stress and general health benefits that come from making yourself a little short of breath and sweaty at least 3-4 days out of the week.  The metabolism and calorie burn benefits aside, almost every chronic ailment in medicine gets better with proper, aerobic exercise.  Allow yourself to start slow and let your body prepare itself to accept harder training 4-6 weeks down the road, but start now and commit to a plan.  Whether you have the means to hire a , join a gym or just walk out your front door or go down to your basement for a video workout, get into a exercise routine and  after 3 weeks of at least 3 times a week exercise you should be at a point where you can slowly start ramping up 10% each week to our goal of at least 150-300minutes weekly of aerobic exercise and at least twice weekly resistance training/strenghtening with weights/bands or body weight exercises.     I am a big fan of modifying the free training plan, \"Couch to 5k\", for almost all of my patients. Just type it into [employer] or look it up on your smart phone keeley store.  To modify the plan,  you can use the training plan for whatever aerobic activity you do (bike/treadmill/elliptical/rower/pool/etc). During the \"jog\" intervals, you just move a little faster or harder or increase the " "tension or incline.  You use those little intervals to switch up the workout and recruit more muscles and pump the blood a little more and then recover again in the \"walk\" intervals by slowing down, decreasing the incline or turning down the tension.  3-4 days a week is not that much to ask and the benefits are enormous.  Start slow and develop the base from which you can then build on and reduce the risk of injury.  It's much more important 2-4 months from now to be enjoying your exercise then it is to over exert yourself at the start and hurt yourself.  Starting slowly allows your body to accept the training better down the road when the exercise becomes crucial for weight maintenance.  Without exercise down the road during your maintenance phase, all this hard work you are about to put in can be undone. It usually takes about 100-300 calories a day of exercise to maintain a weight loss and our focus during weight loss season is to generate the routine/activities and hobbies that make that enjoyable/sustainable.    Thanks for taking this first and most important step in your weight loss season.  Commit to it and we will cheerlead you all the way to success.  When things get tough or off track we'll offer guidance and analysis and when you reach your goal we'll celebrate your success.  In the end, it is all about your success, your health and what you do with it.      Bradly James MD  [facility]  [phone]        LEAN PROTEIN SOURCES  Getting 20-30 grams of protein, 3 meals daily, is appropriate for most people, some need more but more than about 40 grams per meal is not useful.  General rule is drinking one ounce of water per gram of protein eaten over the course of the day:  70 grams of protein each day, drink 70 oz of water.  Protein Source Portion Calories Grams of Protein                           Nonfat, plain Greek yogurt    (10 grams sugar or less) 3/4 cup (6 oz)  " 12-17   Light Yogurt (10 grams sugar or less) 3/4 cup (6 oz)  6-8   Protein Shake 1 shake 110-180 15-30   Skim/1% Milk or lactose-free milk 1 cup ( 8 oz)  8   Plain or light, flavored soymilk 1 cup  7-8   Plain or light, hemp milk 1 cup 110 6   Fat Free or 1% Cottage Cheese 1/2 cup 90 15   Part skim ricotta cheese 1/2 cup 100 14   Part skim or reduced fat cheese slices 1 ounce 65-80 8     Mozzarella String Cheese 1 80 8   Canned tuna, chicken, crab or salmon  (canned in water)  1/2 cup 100 15-20   White fish (broiled, grilled, baked) 3 ounces 100 21   [place]/Tuna (broiled, grilled, baked) 3 ounces 150-180 21   Shrimp, Scallops, Lobster, Crab 3 ounces 100 21   Pork loin, Pork Tenderloin 3 ounces 150 21   Boneless, skinless chicken /turkey breast                          (broiled, grilled, baked) 3 ounces 120 21   [place], [place], [place], and Venison 3 ounces 120 21   Lean cuts of red meat and pork (sirloin,   round, tenderloin, flank, ground 93%-96%) 3 ounces 170 21   Lean or Extra Lean Ground Turkey 1/2 cup 150 20   90-95% Lean [place] Burger 1 vishnu 140-180 21   Low-fat casserole with lean meat 3/4 cup 200 17   Luncheon Meats                                                        (turkey, lean ham, roast beef, chicken) 3 ounces 100 21   Egg (boiled, poached, scrambled) 1 Egg 60 7   Egg Substitute 1/2 cup 70 10   Nuts (limit to 1 serving per day)  3 Tbsp. 150 7   Nut Juarez (peanut, almond)  Limit to 1 serving or less daily 1 Tbsp. 90 4   Soy Burger (varies) 1  15   Garbanzo, Black, Pro Beans 1/2 cup 110 7   Refried Beans 1/2 cup 100 7   Kidney and Lima beans 1/2 cup 110 7   Tempeh 3 oz 175 18   Vegan crumbles 1/2 cup 100 14   Tofu 1/2 cup 110 14   Chili (beans and extra lean beef or turkey) 1 cup 200 23   Lentil Stew/Soup 1 cup 150 12   Black Bean Soup 1 cup 175 12         Example Meal Plan for a 6694-7059 Calorie Diet:    In order to fuel your weight loss properly and avoid hunger-induced  overeating later in the day, for your height and weight, you will enjoy the most success by following the diet below or similar with adjustments based on your particular tastes and preferences.  Exercise may influence speed, amount of weight loss further.     I recommend getting into a meal routine and keeping it similar day to day in the beginning so you don t have to think too hard about what you re going to make/eat.  Keep snacks healthy, ideally containing protein and some vegetables.  Non-processed food is preferable to packaged items.  Eat at least a few crunchy green vegetables if having a snack, which should be 2-3 hours after your mealtimes(prepare these ahead of time for ease of use).  Drink 64 oz -80 oz of water daily for most, some of you will need more and we'll discuss it at your visit if that is the case.      When changing our diet,  we can often mistake thirst for hunger or just have some distracted eating habits that we need to break free from ('bored/mindless eating', screen time,work, driving,etc).  A glass of water and reconsideration of our hunger is often all that is needed.  Having the urge is not the problem, but watching it pass by without acting on it is the goal.    If you re having hunger problems, add a protein drink/snack to your morning hours or afternoon snack with at least 20grams of protein and not too much sugar (under 10g).  A carton of higher protein/low sugar yogurt can work as well.  If the urge to snack is overwhelming and not satiated, try going for a 10 minute walk/exercise, come home and drink a glass of water and if still hungry, have a  calorie snack (handful of raw/sprouted nuts, veggies and string cheese, protein bar, etc).  Savor it.    It is better to have a large breakfast, a moderate lunch and a smaller dinner to fuel your day.  People lose 10-15% more weight during their weight loss season with this strategy. Optimizing your protein intake at each meal will  further keep you more satisfied while eating less food overall.  Getting exercise in early has also been shown to offer the best results (before breakfast ideally but anytime is the right time to exercise if that is not an option for you).    To make sure you re getting adequate vitamins and minerals during weight loss, I recommend one complete multivitamin a day of your choice.  Consider a probiotic and taking some vitamin D 2000 IU daily.    Let supper be your last meal of the day and ideally try to have at least 12 hours between supper and breakfast the next day to tap into some beneficial overnight fasting dynamics.  Midnight snacks need to go away. Water in the evening is fine, unsweetened, non caffeinated herbal tea is helpful as well.  Consolidating your meals within a 8-12 hour period of your day will help tap into these additional metabolic benefits and tends to keep your appetite up for breakfast, further helping to stay on track.  For most of my patients, I don't recommend an intermittent fasting style diet (many find it hard to fit in their lifestyle) but an overnight fast is very doable for most patients and helps regulate our hunger drives a little better.  This makes it very important to nail good intake at all three meals to feel satisfied/energized and still lose weight.      If evening snacking desires are high, consider a glass of fiber supplement for some additional fullness (metamucil or similar). Most of us don't get the 25-30 grams per day of fiber that promotes good gut health/satiety.  Benefiber, metamucil, citrucel are reasonable/affordable options for most people.  Inulin, chicory, psyllium husk are reasonable options but start slow and low in the dose to avoid gas/bloating until your gut gets acclimated (ramping up to 5-10 grams per day of supplemental fiber after 3-4 weeks if needed).      Example Meal Plan:  Breakfast: 450-475 Calories  1 egg cooked on low in olive oil:    calories.  5oz Greek Yogurt (Fage plain classic: ~150 eli)  Handful of Berries of your choice (about a calorie per berry or 20-40cal per handful)    cup(cooked) of  old fashioned oatmeal or 1/2 cup(cooked) steel cut oats. (150 eli)  Sprinkle amount of brown sugar and a pat of butter. (40 eli)  Glass of  Water  Black coffee or unsweetened Tea (0calories).      2-3 hours Later Snack: (195 calories).  Glass of water  One string Cheese (80 calories) or 4 oz creamed cottage cheese (115 calories) with  Crunchy Celery sticks (less than 10 calories per large stalk) 2 stalks. (20 calories)    of a  Large Banana or   of a Large Apple (60 calories):  eat second half at lunch or afternoon snack.     Lunch:300 -350 calories   Chicken Breast  (baked/broiled/roasted/grilled)  4-6 oz.  (125-180 eli), BBQ sauce/hot sauce/mustard/seasoning is free. Just use a reasonable amount. Or a can of tuna with 1 tablespoon mayonnaise.  Salad: lettuce, any other veggies (cucumbers, green peppers/celery you like and a small drizzle of dressing to just flavor.  Go as big on the veggies as you like,  as they are practically calorie free.   A whole, 8 inch cucumber is 45 calories, a whole green pepper is 23 calories, a stalk of celery is 9 calories.  [place] Dressing is 60 calories per tablespoon..so moderate your desired dressing or do a drizzle of olive oil and splash of balsamic vinegar on top,  Total calories unlikely to be over 150 even with dressing.  Glass of Water.    Option for lunch is meal replacement protein drink/smoothie.  Need at least 20 grams of protein and eat the rest of your apple/banana from the morning snack.      Afternoon Snack: 150-200 calories   Cheese Stick or cottage cheese again  and a fresh fruit OR  Granola Bar (protein Bar acceptable if under 200 calories OR  Homemade smoothies:  8oz skim milk,  a handful of berries (fresh or frozen and a serving of protein powder such as BiPro or Patricia sWhey for example.  If  you don't like dairy, make with 8oz water, one small banana, handful of berries and the protein powder, add any veggies you want as well:  roughly 200 calories.   Glass of Water    Dinner: 325 calories  4oz of fresh, Atlantic salmon.  Broiled (salt/pepper/dill) for about 8-8.5 minutes (200calories) or  4oz filet mignon steak or sirloin steak  Salad or vegetable sautéed lightly in olive oil or   Broccoli 1.5  cups chopped and steamed  or micro-waved in a little water (75 calories)  Glass of Water,    Cup of herbal tea (unsweetened, caffeine free)      Herbs and seasonings are encouraged to flavor your foods/vegetables.  Make your food delicious.      Tips for Success:  1.  Prepare proteins ahead of time (broil chicken breasts in bulk so you can grab and go), steel cut oats/lentils can be stored in casserole dish/bowl in the fridge for quick scoop in the morning and rewarm in microwave, make use of crock pot recipes (watch salt content).  Making meals that cover 3-4 future meals is an easy way to stay on track.  2.  Drink a 8-12 oz glass of water every 2-3 hours when awake.  We often mistake hunger for thirst, especially when losing weight.  3. Remember your Reward and Motivation when things get hard.  4.  Weigh yourself every morning and record, you'll stay on track better and learn how our biorhythms, diet and elimination patterns show up on the scale. Don't worry about 1 or 2 day patterns, but when on track you'll notice good trend downward of weight over 3-4 day segments.  Plateaus tend to resolve after 4-8 days in most cases if you stay consistent with your plan.  These are natural and part of weight loss, even if you're perfect with your plan execution.  5. Call if problems/concerns.  [employer] is a great tool to stay in touch and provide weekly outside accountability. Check in with questions or if you want to brag.  6.  Find a handful of meals/foods that keep you on track and feeling good and get into a routine  "that is sustainable for you.  It's OK to have a routine that works for you.  7.  Consider taking a complete multivitamin just to make sure all micronutrients are adequate during weight loss.  8. If losing hair/brittle nails it usually means you are not taking enough protein.  Minimum goal is 60 grams daily of protein for smaller women, 80 grams a day for men. Consider taking Biotin as supplement or a \"Hair and Nail\" multivitamin.      On-the-Go Breakfast Ideas  As of 2015, the latest research shows what a huge impact eating breakfast has on losing weight and feeling your best. People lose more weight when they make breakfast their biggest meal of the day compared to Dinner, but even if you cannot go to that degree, getting a breakfast that has at least 20 grams of protein and even a moderate amount of fat is ideal for maintaining good energy through the day and limits overeating in the evening hours.  The following are some quick and easy suggestions for at least getting something of substance into your body in the morning.  Enjoy!    Eating breakfast within 90 minutes of waking up is an important part of taking care of your body on a restricted calorie diet plan.  After sleeping for hours, your body is in need of fuel.  An ideal breakfast is a combination of protein, whole-grain carbohydrates, or fruit.  Here s why:    -Protein digests very slowly in the body, helping you feel more satisfied.  -Whole grains provide dietary fiber, which also digests slowly and helps keep your gut clean.  -Fruit is a great source of vitamins, minerals, and fiber.     Each one of these breakfast combinations has between 200-300 calories and 15-20 grams of protein.  Feel free to mix and match!    Bone Broth (chicken bone broth or beef bone broth) is a great way to boost protein content. 8oz of bone broth will typically have 9-12grams of protein for 40kcal of energy.    Protein: Choose  -1/2 cup low-fat cottage cheese  -2 hard boiled " eggs , or one cooked in olive oil (low/slow heat).  -1 low fat string cheese stick  -1 Tablespoon natural peanut butter  -Whistle.co.uk vegetarian sausage vishnu (found in freezer section)  -1 slice lowfat cheese  -6 oz 2% or lowfat Greek yogurt, such as Fage or Oikos.    PLUS    Whole Grains:  Choose   -1 whole wheat English muffin  -1 whole wheat radha, half  -1/2 Fiber One frozen muffin, thawed  -1/2 Fiber One toaster pastry  -1 whole wheat bagel thin  -1/2 cup Kashi cereal  -1 Kashi waffle (or other whole grain high-fiber waffle)  Aim for whole grain/sprouted breads with at least 3g of fiber/slice if having bread. [place] is one such brand.    OR    Fruit: Choose  -1/3 cup blueberries  -1/2 banana (or a plantain- similar to a banana, yet smaller)  -1/2 cup cantaloupe cubes  -1 small apple  -1 small orange  -1/2 cup strawberries  -handful raspberries/blackberries (each berry is about 1 calorie).    *Adapted from [employer], Fall 20    Ten Breakfasts Under 250 calories    Ideally, getting between 350-600 calories  (depending on starting height and weight)for breakfast is ideal for avoiding hunger later in the day, adjust/add to the following accordingly:    One- 250 calories, 8.5 g protein  1 slice whole-grain toast   1 Tbsp peanut butter    banana    Two- 250 calories, 8 g protein    cup nonfat/lowfat yogurt  1/3rd cup diced no-sugar peaches  1/3rd cup cereal (like Special K, Cheerios, or bran flakes)    Three- 250 calories, 25 g protein  1 egg scrambled with 1 oz skim milk    cup shredded cheddar    whole grain English muffin  1 oz Grenadian knutson  1 tsp margarine spread    Four- 225 calories, 25 g protein  1/2 cup Kashi Go-Lean cereal    cup skim milk mixed with 1 scoop Bariatric Advantage protein powder    cup no-sugar diced pears    Five- 250 calories, 20 g protein    cup oatmeal prepared with skim milk, 1 scoop protein powder, and sugar-free maple syrup    Six- 200 calories, 5 g protein  1 whole  grain waffle, toasted  1 tablespoon creamy peanut or almond butter    Seven-  250 calories, 19 g protein  Breakfast sandwich: 1 slice whole grain toast, cut in half.  Add 1 scrambled egg and one slice cheddar  cheese.    Eight-  250 calories, 15 g protein  2 eggs scrambled with 1/3 cup frozen spinach (heat before adding to eggs) and 2 tablespoons low fat cream cheese.    Nine-  150 calories, 15 g protein  2/3rd cup cottage cheese    cup cantaloupe    Ten- 200 calories, 20 g protein  Fruit smoothie made with 4 oz. nonfat Greek yogurt,   cup berries, 1 scoop protein powder, and 4 oz skim milk.    Ten Lunches Under 250 Calories    Aim for lunch to be around 300-400 calories a day when trying to lose weight and get that protein in!    One- 200 calories, 11 g protein  1/3 cup tuna salad made with light foreman on 1 slice whole grain bread  1 small peeled apple    Two- 250 calories, 16 g protein  1/3 cup lowfat cottage cheese    cup cooked green beans    small fruit cocktail (in natural juice)    Three- 200 calories, 11 g protein    grilled cheese sandwich on whole grain bread with lowfat cheese  2/3rd cup of tomato soup    Four- 250 calories, 22 g protein  Deli wrap: 1 oz sliced turkey, 1 oz sliced ham, 1 oz sliced chicken rolled up with 1 slice low-fat cheese  1 small orange    Five- 250 calories, 28 g protein  2/3rd cup chili with 1 oz shredded cheese  4 saltine crackers    Six- 250 calories, 22 g protein  1 cup fresh spinach with 2 oz chicken, 1/3rd cup mandarin oranges, and 2 tablespoons sliced almonds with 1 tablespoon  vinaigrette dressing    Seven- 200 calories, 11 g protein  1 Tbsp sugar-free preserves and 1 Tbsp peanut butter on 1 slice whole grain toast    cup nonfat/lowfat Greek yogurt    Eight- 250 calories, 18 g protein  1 small soft-shell chicken taco with 1 oz shredded cheese, lettuce, tomato, salsa, and 1 Tbsp light sour cream    cup black beans    Nine- 225 calories, 13 g protein  2 ounces baked  chicken  1/4 cup mashed potatoes    cup green beans    Ten- 200 calories, 21 g protein  Deli radha: 2 oz roast beef or other deli meat with 1 tsp Ezequiel mayonnaise and sliced tomato, onion, and lettuce  1/3rd cup cottage cheese      Ten Dinners Under 300 calories    If you're eating a large breakfast and medium lunch, keep dinner small.  300-400 calories is ideal for most people depending on their caloric needs.    One- 300 calories, 12 g protein  1-inch thick slice of turkey meatloaf    cup baked butternut squash    Two- 200 calories, 9 g protein  Bread-less BLT: 3 slices turkey knutson, sliced tomato, wrapped in a large lettuce leaf    cup peeled fruit    Three- 275 calories, 36 g protein  3 oz roasted chicken    cup cooked broccoli    cup shredded cheddar cheese    cup unsweetened applesauce    Four- 200 calories, 25 g protein  3 oz baked tilapia  1/3rd cup cooked carrots    cup yogurt    Five- 250 calories, 20 g protein  Grilled ham  n  Swiss: spread 2 tsp ghee or butter on 1 slice of whole grain bread.  Cut bread in half, layer 2 oz deli ham with 1 piece of Swiss cheese and grill until cheese is melted.    cup cooked vegetables    Six- 250 calories, 18 g protein  Vegetarian cheeseburger: 1 Boca cheeseburger topped with lettuce, onion, tomato, and ketchup/mustard    cup sweet potato fries    Seven- 250 calories, 18 g protein  Pork pot roast: 2 oz roasted pork loin, 1/3rd cup roasted carrots,   medium potato, cooked with   cup gravy    Eight- 330 calories, 25 g protein  2 oz meatballs (about 2 small meatballs)    cup spaghetti sauce  1/2 piece toast topped with 1 tsp ghee or butterand topped with garlic powder, toasted in oven    Nine- 250 calories, 16 g protein  Mexican pizza: one 8  corn tortilla topped with 2 oz chicken,   cup salsa, 2 tablespoons black beans, 2 tablespoons shredded cheese.  Bake until cheese is melted.    Ten- 250 calories, 22 g protein  Shrimp stir-smith: 3 oz cooked shrimp, 1/6th onion,   pepper,    cup chopped carrots sautéed in 1 tablespoon olive oil, topped with 2 tablespoons stir smith sauce and a pinch of sesame seeds        150 Calories or Less Snack Ideas   1 hardboiled egg with   cup berries  1 small apple with 1 hardboiled egg  10 almonds with   cup berries  2 clementines with 1 light string cheese  1 light string cheese with   sliced apple  1 light string cheese wrapped in 2 slices of turkey  4 100% whole wheat crackers (e.g. Triscuit) with 1 light string cheese    c. cottage cheese with   cup fruit and 1 Tbsp sunflower seeds     cup cottage cheese with   of an avocado     can tuna fish with 1 cup sliced cucumbers     cup roasted garbanzo beans with paprika and cayenne pepper    baked sweet potato with   cup chili beans or   cup cottage cheese  2 oz. nitrate free turkey slices with 1 cup carrots  1 container (6 oz) of low sugar (less than 10 grams of sugar) greek yogurt   3 Tablespoons of hummus with 1 cup sliced bell peppers   2 Tablespoons of hummus with 15 baby carrots  4 Tablespoons ranch dip made with plain Greek Yogurt and 3 mini cucumbers  1/4 cup nuts (any kind)  1 Tablespoon peanut butter with 1 stalk celery   1 dill pickle wrapped in 1-2 slices of deli ham with 1 tsp of mayonnaise/mustard.      MEDICATIONS FOR WEIGHT LOSS  There are several medications available to assist us in weight loss.  By themselves, without a mindful change in diet and increase in movement/activity these medications are disappointing in their results. However, combined with a closely monitored program of diet change and exercise they can be very effective in controlling appetite and boosting initial weight loss.  All weight loss medications need continual re-evaluation for efficacy as their side effects and health benefits fail to be worthwhile if a person is not continuing to lose weight or in maintaining their healthy weight.  Some weight loss medications are scheduled drugs, meaning there is at least a theoretical  possibility for developing addiction to them, but in practice this is rare.  We do anticipate coming off meds in the future- after stabilization of weight loss is assurred.  Finally, a tolerance can develop and people s perceived efficacy of medication can diminish.  In communication with your physician, it may be appropriate to intermittently take a break from these medications and then restart again (few weeks off then restart again) if a plateau is reached that cannot be broken through.  Each person can respond to a medication differently and to be a good option for you, it will need to be affordable, effective and well tolerated with minimal side effects.    In most cases, weight loss progress after one month and three months will be obtained and if a patient is not reaching the satisfactory progress towards weight loss, the medications may be discontinued.  The thought is that if a person is taking a weight loss medication and not receiving the potential health benefit of that drug, the side effects are not worthwhile and use should be discontinued.  On the flip side, there are many people on some weight loss medications for years because it continues to be an effective tool in their weight management and they are tolerating the medication without any long-term side effects.  Each person's response and purpose will be evaluated.    PHENTERMINE (Adipex): approved in 1959 for appetite suppression.  It has stimulant effects and cannot be used with Ritalin, Concerta, or other stimulants.  Although it is not highly addictive, it's chemically related to amphetamines which are addictive and is classified as a Controlled Substance by the [place].  Occasional dependence can develop, but rarely. The most common side effects are dry mouth, increased energy and concentration, increased pulse, and constipation.  You should not take phentermine if you have glaucoma, hyperthyroidism, or uncontrolled/untreated hypertension or  overly anxious. You should stop if dramatic mood swings, severe insomnia, palpations, chest pains, visual changes or if your Blood Pressure is consistently elevated or any time it's over 160/90.   It's ok to go off the med for a few weeks and restart if efficacy is wearing off.  $24-$30 for 90 tablets at [facility]. Females are required to have reliable birth control to reduce the risk birth defects/miscarriage.    TOPIRAMATE (Topamax): Anti-seizure medication, also used to prevent migraines and sometimes for mood stabilization.  Side effects include paresthesia, glaucoma, altered concentration, attention difficulties, memory and speech problems, metabolic acidosis, depression, increase in body temperature and decrease sweating, risk of kidney stones.  Do not take Topamax while taking Depakote as this can cause high ammonia levels.  You must have reliable birth control as Topamax can cause birth defects.  If prolonged use has occurred it should be tapered off slowly to avoid withdrawal issues.  Insurance usually covers Topiramate.  At higher doses, there may be some confusion/forgetfulness associated with this so we try to limit dose to under 75mg twice daily to reduce this risk. Often covered by insurance as it's used for many reasons.  Topamax will cause carbonated beverages to taste bad. A recheck of your kidney/electrolytes may occur within a few months of starting.    QSYMIA (Phentermine + Topamax extended release):  See above information about phentermine and Topamax.  Most common side effects are paresthesia, dizziness, distortion of taste, insomnia, constipation, and dry mouth.  See above descriptions for the two individual agents.Females are required to have reliable birth control to reduce the risk birth defects/miscarriage.  $100-200 per month but coupons/programs exist at Qsymia.com that may reduce costs depending on a patient's coverage. Has  a low, medium and higher dosing option and usually  titrating upwards is expected for continued good benefit and consideration for tapering downward or using lower dose in stabilization phases.    GLP1 Agonists:  Liraglutide (Victoza/Saxenda), Semaglutide (Ozempic/Wegovy):   Part of the family of Glucagon Like Peptide Agonists, these medications directly suppresses appetite and are often used by diabetic patients due to improvements in glucose/insulin balance.  In 2024, Wegovy also has been FDA approved for reducing risks of heart attacks in those with established coronary heart disease in those that are overweight or obese. These medications also slow how quickly the stomach empties, increasing fullness. They may be hard to get covered for non diabetics and some plans have exclusions for weight loss purposes.  Currently, these are  injectable medications delivered via autoinjector pen. It can be very costly without insurance coverage (over $500/month).  Due to high demand, there have been cycles of unavailability that can affect the supply or ramping up of these medications.  Small risks for pancreatitis exists and dose should be held if increased mid abdominal pain/burning. It is not to be used if previous Multiple Endocrine Neoplasia. In rodents, may increase risk of thyroid tumors and not indicated for anyone with a history of medullary thyroid cancer as a result.  If changes in voice/swallowing should be discontinued. Reliable birth control required in women. Saxenda.[email or web address], Wegovy.com has more information on these medications.  It can take up to 6 weeks for some of these medications to get out of the body after the last injection.  Should be stopped a few weeks prior to elective surgical procedures and may require re-ramping afterwards.    Zepbound (Tirzepatide):  Similar in many ways to Wegovy/Saxenda type products, works by boosting satiety signals that improve sense of fullness/satiety, boosting both GIP and GLP1 hormones. Not to be used by those with Multiple  "endocrine neoplasia, medullary thyroid cancer and not likely tolerated well for those with recurrent/idiopathic pancreatitis issues. Costly without insurance coverage but very effective in curbing appetite. Currently has highest average weight reduction in clinical drug trials.  Once weekly injectable therapy. Nausea/upset stomach/constipation or diarrhea are common side effects. Heart burn can increase in some, as it slows stomach emptying speeds. Should be stopped a few weeks prior to elective surgical procedures and may require re-ramping afterwards. [employer] has good patient resources/information.    Contrave (Bupropion/Naltrexone).    Synergistic combination of a mild appetite suppressing anti-depressant (Bupropion) whose effects are increased due to interaction with Naltrexone.  Naltrexone may have some effects on craving and is often used in addiction medicine to help previous opiate addicts be less prone to relapse as it blocks the action of opiates. Should be stopped if any need for opiate pain medication, surgery or planned procedures where you'll be given sedation/anesthesia. If prolonged use, recommend stepping down bupropion over 2-3 weeks to limit any risk of withdrawal issues. Side effects may include dry mouth, increased heart rate, mild elevation in Blood pressure;  dizziness, ringing in the ears, anxiety (typically due to bupropion), nausea, constipation, and some get fatigued with naltrexone.  About $210 on [facility] [employer] for 120 tabs of \"Contrave\", the brand name without insurance coverage. Generic Bupropion 75mg: $25 for 120 tabs, Naltrexone: $55 for 90 tabs without insurance coverage on [employer]. Cannot be used if pregnant/trying to conceive or breast feeding.    Plenity:   NO LONGER AVAILABLE due to company going bankrupt. [email or web address] has more information.        Information about the Weight Loss Medication Phentermine    When combined with mindful eating and behavior changes, weight loss " "medications can be a nice additional tool to maximize your weight loss season.  There are no magic pills and without diet and behavior changes, weight loss will be minimal.  Think of this medication as a tool to make your diet and behavior changes easier and you'll enjoy a higher probability of success.  Remember not to skip meals, but use this medication to tolerate your reduced calories more easily.  If you are very hungry in the evenings, you are likely not eating enough in the first 10 hours of your day and need to focus on getting your protein requirements in at each of your 3 daily meals.      Phentermine is a stimulant medication related to the amphetamine class of medication but with a lower risk of dependence and addiction.  It is used for weight loss by suppressing the appetite region of the brain.  It also may speed up the metabolic rate and give a person more energy.  Like any medication there are potential side effects and the most common are:  Dry mouth occurs in almost everyone (hydrate well), fewer people experience Palpatations, fast heart rate, elevation of blood pressure, restlessness, insomnia, dizziness, change in mood, tremor, headache, changes in bowel movements,itchiness, changes in sex drive.  If you are or may have become pregnant, do not use phentermine as it increases the risk for birth defects/miscarriage.  Do not use if breastfeeding.    Some people can develop serious side effects which include:  Heart strain (\"ischemia\").  Tachycardia (fast heart rate or irregular heart rate).  Hypertension  Pulmonary Hypertension  Psychosis  Dependency and abuse has occurred in some.  If you've been on high dose (37.5mg) for long periods, phentermine should be tapered down over a few weeks before abruptly stopping as seizures have been reported rarely.    We do not recommend taking it in combination with the following medications due to potential drug interactions which can increase the risk of side " effects and/or potential for seizures:    Absolutely contraindicated are:  Amphetamines or other stimulants like ADHD medication: (dextroamphetamine, amphetamine, diethylpropion, isocarboxazid, methamphetamine, lisdexamfetamine, benzphetamine,dexmethylphenidate, methylphenidate, selegiline patch, sibutramine, tranylcypromine.    Avoid use with:   Dopamine, dobutamine, ephedra, ephedrine, epinephrine, isoproterenol, linezolid, norepinephrine, phenylephrine injection, venlafaxine (Effexor).    Monitor or modify dose with:  Acebutolol, atenolol, betaxolol, bisoprolol, carvedilol, droxidopa, esmolol, labetalol, magnesium citrate, metoprolol, nadolol, nebivolol, penbutolol, pindolol, propranolol, sotalol, timolol.    Caution with: armodafinil,betaxolol eye drops, brexpiprazole, bupropion, busulfan, caffeine, carteolol drops, enzalutaminde, ginseng, green tea, guarana, levobunolol drops, lindane cream, modafinil, afrin nasal spray (oxymetazoline), pamabrom, phenylephrine oral and nasal spray, pseudoephedrine (sudafed), rasgiline, sleegiline, bowel prep, tiagabine, timolol drops,  TRAMADOL due to increased risk of seizures.    The current cheapest place to fill your prescription is at [facility], [facility], [facility] or [place] [facility] or [employer] and is around $22for 90 tablets.  Occasionally, [employer] and [employer] have price matched, so call around and get the best price for you.  Other pharmacies may charge closer to$70- $100 for the same prescription. You don't have to be a member to use the pharmacy at [facility] currently.  An alternative some patients have tried is using a voucher system through [employer].  $25 paid on their website gets you a  voucher that can allows you to pick your meds up at [facility] without paying anything more.  [employer] may also offer discounted coupons and give prices around you.    Dosing:  We start with half a tablet for the first 2-3 weeks and if tolerating it without problems, you  "can take up to one full tablet daily in the morning after breakfast.  For those with evening hunger problems, sometimes half a tablet in the morning and half a tablet around 1 pm can be effective, however, risks of nighttime insomnia/restless increase with afternoon dosing so call me at the clinic if considering this regimen or having any issues.  You only have to use the amount effective for you, not to exceed one full tablet.  It can also be used situationaly and does not have to be taken every day. For more sensitive individuals,: get a pill cutter and cut the half tab into quarters and use a quarter of a tablet, about 9mg, for a couple weeks before increasing to half a tablet (18.75mg) if needed.  As always, if any questions give us a call at the [facility] telephone:  [phone].     Don't use Phentermine if sick/ill or using other stimulants/cold medications and it's OK to skip days that you don't feel the need for appetite suppressant assistance.  This medication works the day you take it and doesn't require \"building up\" in the system.      [facility] is now offering compounded semaglutide during the time of Wegovy national shortage/limited supply. Semaglutide is the generic name of Wegovy. [facility] is following the highest standards for sterility and compounding practices. Not all compounding practices are equal. Therefore, [facility] will not be prescribing compounded semaglutide outside of the [facility]. Compounding of semaglutide is legal for as long as Wegovy is on the FDA's national shortage list. When/if Wegovy is taken off the FDA's shortage list, compounded semaglutide will no longer be legal to manufacture. When this occurs, patients will have to turn to acquiring Wegovy via its available manufactured pen, look into alternative weight loss medication(s), or stop the medication. " Compound semaglutide will be available as a pre-filled syringe. Due to high demand of compounded semaglutide, orders may take 1-2 weeks to obtain from time of prescribing. Each dose of the medication will require a separate prescription.     As with any weight loss medication(s), there is a risk of weight regain should you stop semaglutide. It is important to be aware of this risk should you stop compounded semaglutide with no plans to transition back to an alternative injectable option as the use of semaglutide is intended for long term weight management with the intention of remaining on this injectable long term.        Obtaining Medication and Storage:   The pharmacy must speak to the patient directly prior to shipping medication to walk through administration, shipping and cost. Pre-filled syringes of compounded semaglutide and needles will be mailed from the compounding pharmacy to your home in a refrigerated box. The pre-filled syringes should be stored in the refrigerator until time of injection. The medication is good for at least 30 days in refrigerator.       Administration:   Wash your hands.   Obtain compound semaglutide syringe, needle and alcohol swab. Remove pre-filled syringe from refrigerator. Keep all other unused syringes in the refrigerator until time of use.   Inspect the syringe to ensure medication in syringe is clear/colorless and the clear shell cap on top of red syringe cap is intact.   Unlock the cap by pulling the clear outer shell straight off and remove the red syringe cap by twisting counter clockwise.   Attach needle to syringe by twisting needle onto syringe clockwise. Do not remove needle cap.   Choose injection site. Clean injection site with alcohol swab.    Appropriate areas of injection are: abdomen (at least 2 inches away from belly button) or front middle thigh.   Remove needle cap from syringe/needle.   Hold the syringe like a pencil. Insert the needle into skin at a  "90-degree angle.   Using your pointer finger, push the syringe plunger down to inject the medicine.   Count to six. Then, remove the syringe from your skin.   Immediately place the syringe in a sharps container.   You can purchase a sharps container from your local pharmacy or [employer]. If you don't have a sharps container, you can use a plastic detergent container with a lid. The container should seal tightly, hold objects without leaking, breaking or cracking, and clearly be labelled \"sharps\".     Compounded Semaglutide monthly (4 pre-filled syringes) cost:   0.25 mg ~$222   0.5 mg ~$260   1 mg ~$306   1.5 mg ~$342   2 mg ~$395   2.5 mg ~$438     [facility] Phone:  [phone]   Wegovy (semaglutide) is a very effective satiety boosting appetite suppressant. It needs to be ramped up slowly to be tolerated adequately.  About 1/10 people will not tolerate this medication. Each month, you move up to a higher dose until eventually reaching the 2.4mg/week dose if tolerated. When in plentiful supply, one try at re-ramping is recommended if the initial ramping isn't tolerated well and if that re-ramping isn't tolerated well, it's best to avoid this medication.       Wegovy starts at 0.25mg/week for 4 weeks then increases to 0.5mg/week for 4 weeks then 1mg/week for 4 weeks then 1.7mg/week for 4 weeksthen 2.4mg/week usually for 6-9 months if tolerated well.  Injections can be given after cleansing the skin with alcohol prep pad or swab (available OTC).     Stop Wegovy if severe abdominal pain/vomiting/rash/throat swelling or constant nausea that prevents adequate food/water intake. Stop 2-3 weeks prior to any planned general anesthesia surgeries to reduce risk for something called a post operative ileus.     Gallstones can occur in about 1% of patients on this medication so update me if increase right upper abdominal pain after eating.     Start meals with protein first, separate beverages from meals by 20 " minutes and work hard in between meals to get your 64-75 oz of water daily to reduce risks for severe constipation. Consider a fiber supplement like powdered psyllium husk in 12 oz water each night, stool softerners as needed and Miralax or milk of Magnesia if more than 3 days have passed without a Bowel Movement.     Check out [employer] for patient resources.  If you have weekends off, I recommend dosing Friday evenings.     Some people starve on this medication if not mindful about food intake. I recommend starting meals with the protein part of your meal first, chew thoroughly and separate beverages from meals by about 20 minutes to make sure you get your nourishment in first. Include vegetables/complex carbohydrates and unsaturated fat as part of your balanced diet but group these at the end of the meal, after protein is mostly gone. Satiety will kick in too early if drinking too much with meals and under nourishment can result.     It's not a bad idea to take a complete multivitamin most days of the week if using this medication.     Pancreatitis is a very rare but potentially serious side effect. Stop Wegovy if severe mid abdominal pain/burning in nature or if unable to eat/drink due to severe nausea/discomfort.   People with strong history of pancreatitis without clear cause should stay clear of this medication as should those with strong personal or family history for medullary thyroid cancer or Multiple Endocrine Neoplasia (rare).     Stop Wegovy at least 2 weeks prior to any planned surgery.    Kind Regards,  Bradly James MD  [facility]

## 2024-04-15 NOTE — LETTER
"    4/15/2024         RE: Komal Parra  [address] [facility] [address]        Dear Colleague,    Thank you for referring your patient, Komal Parra, to the [facility] [place]. Please see a copy of my visit note below.    New Medical Weight Management Consult    PATIENT:  Komal Parra  MRN:         [id number]  :         1985  DALI:         4/15/2024        I had the pleasure of seeing your patient, Komal Parra. Full intake/assessment was done to determine barriers to weight loss success and develop a treatment plan. Komal Parra is a 38 year old female interested in treatment of medical problems associated with excess weight. She has a height of 5' 7\", a weight of 216 lbs 0 oz, and the calculated Body mass index is 33.83 kg/m .  She was referred by her PCP 24 for help with her Class I obesity.    Komal is a patient with early onset class I obesity with significant element of familial/genetic influence and without current health consequences beyond the health risks of Class I obesity as she ages. She does need aggressive weight loss plan due to feeling limited in her health.  Komal Parra uses food as mood management, has perception of intense hunger, and has a disorganized meal pattern.      Her problem is complicated by a hunger disorder, strong craving/reward pathways, gender and short stature, impaired metabolic perception, and balancing mindful diet in the face of high mileage runner (training for half marathon in mid , up to about 25 miles weekly now).       Review of the patient's history and habits today suggest that weight gain has been  intermittently problematic in the last 5 years with some brief but non durable control.    Previous Interventions found to be helpful in the past for weight loss include mindful diet/exercise combination but harder to see results as she's aging and having less self " care time..    We discussed a toolbox approach to weight management today and she is open to combining mindful, reduced calorie dietary therapy with increased mindfulness techniques, activity/exercise improvements to optimize their current and future health.  Medication assistance for appetite control was discussed today and on review of the risks/benefits for this patients health history, we've decided to start with appetite suppressant therapy, mindful of the high mileage she's putting in for her race. As such, we'll hold off on injectable therapies for now and ramp up low dose phntermine, Lomaira 8mg.    ASSESSMENT AND PLAN  Problem List Items Addressed This Visit    None  Visit Diagnoses       Class 1 obesity due to excess calories with body mass index (BMI) of 33.0 to 33.9 in adult, unspecified whether serious comorbidity present    -  Primary    Relevant Medications    phentermine (LOMAIRA) 8 MG tablet    Other Relevant Orders    Comprehensive metabolic panel (Completed)           Plan:  Welcome to your weight loss season. Aiming for 1776kcal/day and 70-75 grams of lean protein. Increase mindful hydration, aiming for 80 oz/day and as needed on longer runs to replace sweat and electrolyte losses.     2. While training for half marathon the next 2 months, we'll avoid injectable therapies and try low dose phentermine 8mg with lunch.  You can increase to twice daily if needed or shift your phentermine to pre supper if it doesn't affect your ability to fall asleep.    We'll consider compounded semaglutide after your race.    3. Check labs.    4. Follow up with dietician, working with Annamarie Juarez, but I'd recommend one visit with ours at least to focus on weight management in the context of your other health goals at [facility].    5. Exercise is heavy right now and as that reduces after event, let me know what your routine is for your new goal intake levels.    6. Track water and food/protein intake until you  "know what those 1700-1800kcal/day and 80 oz of water looks/feels like.        60 minutes spent by me on the date of the encounter doing chart review, history and exam, documentation and further activities per the note        She has the following co-morbidities:        4/14/2024    12:50 PM   --   I have the following health issues associated with obesity GERD (Reflux)   I have the following symptoms associated with obesity None of the above           4/14/2024    12:50 PM   Patient Goals   If yes, please indicate which surgery? Hiatial hernia repair           4/14/2024    12:50 PM   Referring Provider   Please name the provider who referred you to Medical Weight Management  If you do not know, please answer \"I Don't Know\" Dorota Thomas           4/14/2024    12:50 PM   Weight History   How concerned are you about your weight? Somewhat Concerned   I became overweight After Pregnancy   The following factors have contributed to my weight gain Other   Please list the other factors I don't know why   I have tried the following methods to lose weight Watching Portions or Calories    Exercise    Weight Watchers    Meal Replacements    Fasting   My lowest weight since age 19 was 110   My highest weight since age 19 was 220   The most weight I have ever lost was (lbs) 8   I have the following family history of obesity/being overweight I am the only one in my immediate family who is overweight   How has your weight changed over the last year? Gained   How many pounds? 5   Workouts 5 days weekly: running now (previous marathon/half marathon), 5-8 miles. Training for [employer]'s half marathon.   Circuit training 2 days weekly at the gym.     Has food journal, doing elimination diet wheat/corn/dairy and nightshades. Wheat is the worse trigger for her.   Eating disorder in her early 20s.   Working now at [employer], . Day job, not yet traveling.   Kids are 10 and 13 yo. .         4/14/2024    12:50 PM "   Diet Recall Review with Patient   If you do eat breakfast, what types of food do you eat? Eggs, toast   If you do eat lunch, what types of food do you typically eat? Salad, sandwich   If you do eat supper, what types of food do you typically eat? Chicken, veggies, spaghetti, stir smith, etc   If you do snack, what types of food do you typically eat? Granola bars, popcorn, trail mix, fruit   How many glasses of juice do you drink in a typical day? 0   How many of glasses of milk do you drink in a typical day? 0   How many 8oz glasses of sugar containing drinks such as Patrick-Aid/sweet tea do you drink in a day? 0   How many cans/bottles of sugar pop/soda/tea/sports drinks do you drink in a day? 0   How many cans/bottles of diet pop/soda/tea or sports drink do you drink in a day? 0   How often do you have a drink of alcohol? Monthly or Less   If you do drink, how many drinks might you have in a day? 1 or 2           4/14/2024    12:50 PM   Eating Habits   Generally, my meals include foods like these bread, pasta, rice, potatoes, corn, crackers, sweet dessert, pop, or juice A Few Times a Week   Generally, my meals include foods like these fried meats, brats, burgers, french fries, pizza, cheese, chips, or ice cream Less Than Weekly   Eat fast food (like McDonalds, [employer]dez, Taco Bell) Less Than Weekly   Eat at a buffet or sit-down restaurant Less Than Weekly   Eat most of my meals in front of the TV or computer Once a Week   Often skip meals, eat at random times, have no regular eating times Never   Rarely sit down for a meal but snack or graze throughout A Few Times a Week   Eat extra snacks between meals Once a Week   Eat most of my food at the end of the day Never   Eat in the middle of the night or wake up at night to eat Never   Eat extra snacks to prevent or correct low blood sugar Less Than Weekly   Eat to prevent acid reflux or stomach pain A Few Times a Week   Worry about not having enough food to eat Never    I eat when I am depressed Never   I eat when I am stressed Less Than Weekly   I eat when I am bored Once a Week   I eat when I am anxious Once a Week   I eat when I am happy or as a reward Once a Week   I feel hungry all the time even if I just have eaten Never   Feeling full is important to me Less Than Weekly   I finish all the food on my plate even if I am already full Once a Week   I can't resist eating delicious food or walk past the good food/smell Less Than Weekly   I eat/snack without noticing that I am eating Less Than Weekly   I eat when I am preparing the meal A Few Times a Week   I eat more than usual when I see others eating Less Than Weekly   I have trouble not eating sweets, ice cream, cookies, or chips if they are around the house Less Than Weekly   I think about food all day Once a Week   What foods, if any, do you crave? Cheese           4/14/2024    12:50 PM   Amount of Food   I feel out of control when eating Never   I eat a large amount of food, like a loaf of bread, a box of cookies, a pint/quart of ice cream, all at once Never   I eat a large amount of food even when I am not hungry Never   I eat rapidly Weekly   I eat alone because I feel embarrassed and do not want others to see how much I have eaten Never   I eat until I am uncomfortably full Never   I feel bad, disgusted, or guilty after I overeat Never           4/14/2024    12:50 PM   Activity/Exercise History   How much of a typical 12 hour day do you spend sitting? Half the Day   How much of a typical 12 hour day do you spend lying down? Less Than Half the Day   How much of a typical day do you spend walking/standing? Half the Day   How many hours (not including work) do you spend on the TV/Video Games/Computer/Tablet/Phone? 2-3 Hours   How many times a week are you active for the purpose of exercise? 4-5 TImes a Week   How many total minutes do you spend doing some activity for the purpose of exercising when you exercise? More Than  30 Minutes       PAST MEDICAL HISTORY:  Past Medical History:   Diagnosis Date     KELLEY (generalized anxiety disorder)     Celexa 20 mg daily     IBS (irritable bowel syndrome)            4/14/2024    12:50 PM   Work/Social History Reviewed With Patient   My employment status is Full-Time   My job is    How much of your job is spent on the computer or phone? 100%   How many hours do you spend commuting to work daily? 0   What is your marital status? /In a Relationship   If in a relationship, is your significant other overweight? No   If you have children, are they overweight? No   Who do you live with? , kids   Who does the food shopping? Me           4/14/2024    12:50 PM   Mental Health History Reviewed With Patient   Have you ever been physically or sexually abused? No   How often in the past 2 weeks have you felt little interest or pleasure in doing things? Not at all   Over the past 2 weeks how often have you felt down, depressed, or hopeless? Not at all           4/14/2024    12:50 PM   Sleep History Reviewed With Patient   How many hours do you sleep at night? 7       Past Surgical History:   Procedure Laterality Date     BIOPSY  Dec 2034    Endoscopy     ESOPHAGOSCOPY, GASTROSCOPY, DUODENOSCOPY (EGD), COMBINED N/A 09/20/2021    Procedure: ESOPHAGOGASTRODUODENOSCOPY (EGD);  Surgeon: Rashad Barber MD;  Location: [facility] OR     ESOPHAGOSCOPY, GASTROSCOPY, DUODENOSCOPY (EGD), COMBINED N/A 12/28/2023    Procedure: ESOPHAGOGASTRODUODENOSCOPY, WITH BIOPSY;  Surgeon: Sandra Aldrich MD;  Location: [facility] OR     HC TOOTH EXTRACTION W/FORCEP  01/01/2002     SOFT TISSUE SURGERY  2/1/24    Ganglion cyst     WISDOM TOOTH EXTRACTION         Social History     Socioeconomic History     Marital status:      Spouse name: Not on file     Number of children: Not on file     Years of education: Not on file     Highest education level: Not on file   Occupational History     Not on file    Tobacco Use     Smoking status: Never     Passive exposure: Never     Smokeless tobacco: Never   Substance and Sexual Activity     Alcohol use: No     Drug use: No     Sexual activity: Yes     Partners: Male     Birth control/protection: I.U.D., Male Surgical   Other Topics Concern     Parent/sibling w/ CABG, MI or angioplasty before 65F 55M? No   Social History Narrative     Not on file     Social Determinants of Health     Financial Resource Strain: Low Risk  (2/14/2024)    Financial Resource Strain      Within the past 12 months, have you or your family members you live with been unable to get utilities (heat, electricity) when it was really needed?: No   Food Insecurity: Low Risk  (2/14/2024)    Food Insecurity      Within the past 12 months, did you worry that your food would run out before you got money to buy more?: No      Within the past 12 months, did the food you bought just not last and you didn t have money to get more?: No   Transportation Needs: Low Risk  (2/14/2024)    Transportation Needs      Within the past 12 months, has lack of transportation kept you from medical appointments, getting your medicines, non-medical meetings or appointments, work, or from getting things that you need?: No   Physical Activity: Sufficiently Active (2/14/2024)    Exercise Vital Sign      Days of Exercise per Week: 4 days      Minutes of Exercise per Session: 40 min   Stress: Stress Concern Present (2/14/2024)    [place] - Occupational Stress Questionnaire      Feeling of Stress : To some extent   Social Connections: Unknown (2/14/2024)    Social Connection and Isolation Panel [NHANES]      Frequency of Communication with Friends and Family: Not on file      Frequency of Social Gatherings with Friends and Family: Twice a week      Attends Muslim Services: Not on file      Active Member of Clubs or Organizations: Not on file      Attends Club or Organization Meetings: Not on file       Marital Status: Not on file   Interpersonal Safety: Low Risk  (1/18/2024)    Interpersonal Safety      Do you feel physically and emotionally safe where you currently live?: Yes      Within the past 12 months, have you been hit, slapped, kicked or otherwise physically hurt by someone?: No      Within the past 12 months, have you been humiliated or emotionally abused in other ways by your partner or ex-partner?: No   Housing Stability: Low Risk  (2/14/2024)    Housing Stability      Do you have housing? : Yes      Are you worried about losing your housing?: No       MEDICATIONS:   Current Outpatient Medications   Medication Sig Dispense Refill     Apple Cider Vinegar 600 MG CAPS Take 2 capsules by mouth       CALCIUM PO        citalopram (CELEXA) 10 MG tablet Take 1 tablet (10 mg) by mouth every morning 90 tablet 3     Digestive Enzymes (DIGESTIVE ENZYME PO)        EPINEPHrine (ANY BX GENERIC EQUIV) 0.3 MG/0.3ML injection 2-pack INJECT 0.3ML (0.3MG) INTO THE SHOULDER,THIGH, OR BUTTOCKS ONCE FOR 1 DOSE       ferrous sulfate dried 160 (50 Fe) MG tablet Take 1 tablet by mouth daily (with breakfast)       levonorgestrel (MIRENA, 52 MG,) 52 MG (20 mcg/day) IUD 1 Device by Intrauterine route       omeprazole (PRILOSEC) 20 MG DR capsule TAKE 1 CAPSULE BY MOUTH TWICE A  capsule 2     phentermine (LOMAIRA) 8 MG tablet Take 1 tablet (8 mg) by mouth daily (with lunch) for 180 days Start one tablet with lunch for 2 weeks then increase if tolerated to one tablet with lunch and one tablet prior to supper (5 hours prior to bedtime minimum). 90 tablet 1     vitamin C with B complex (B COMPLEX-C) tablet Take 1 tablet by mouth daily       Vitamin D, Cholecalciferol, 25 MCG (1000 UT) CAPS        dicyclomine (BENTYL) 10 MG capsule Take 1 capsule (10 mg) by mouth 4 times daily as needed (abdominal cramping) 60 capsule 3       ALLERGIES:   Allergies   Allergen Reactions     Ibuprofen GI Disturbance     TSH   Date Value Ref Range  Status   02/15/2019 1.99 0.30 - 5.00 uIU/mL Final     Last Comprehensive Metabolic Panel:  Sodium   Date Value Ref Range Status   04/15/2024 141 135 - 145 mmol/L Final     Comment:     Reference intervals for this test were updated on 09/26/2023 to more accurately reflect our healthy population. There may be differences in the flagging of prior results with similar values performed with this method. Interpretation of those prior results can be made in the context of the updated reference intervals.      Potassium   Date Value Ref Range Status   04/15/2024 4.3 3.4 - 5.3 mmol/L Final   03/24/2021 4.2 3.5 - 5.0 mmol/L Final     Chloride   Date Value Ref Range Status   04/15/2024 107 98 - 107 mmol/L Final   03/24/2021 108 (H) 98 - 107 mmol/L Final     Carbon Dioxide (CO2)   Date Value Ref Range Status   04/15/2024 24 22 - 29 mmol/L Final   03/24/2021 24 22 - 31 mmol/L Final     Anion Gap   Date Value Ref Range Status   04/15/2024 10 7 - 15 mmol/L Final   03/24/2021 8 5 - 18 mmol/L Final     Glucose   Date Value Ref Range Status   04/15/2024 80 70 - 99 mg/dL Final   03/24/2021 73 70 - 125 mg/dL Final     Urea Nitrogen   Date Value Ref Range Status   04/15/2024 11.7 6.0 - 20.0 mg/dL Final   03/24/2021 11 8 - 22 mg/dL Final     Creatinine   Date Value Ref Range Status   04/15/2024 0.87 0.51 - 0.95 mg/dL Final     GFR Estimate   Date Value Ref Range Status   04/15/2024 88 >60 mL/min/1.73m2 Final   03/24/2021 >60 >60 mL/min/1.73m2 Final     Calcium   Date Value Ref Range Status   04/15/2024 9.1 8.6 - 10.0 mg/dL Final     Bilirubin Total   Date Value Ref Range Status   04/15/2024 0.5 <=1.2 mg/dL Final     Alkaline Phosphatase   Date Value Ref Range Status   04/15/2024 47 40 - 150 U/L Final     Comment:     Reference intervals for this test were updated on 11/14/2023 to more accurately reflect our healthy population. There may be differences in the flagging of prior results with similar values performed with this method.  Interpretation of those prior results can be made in the context of the updated reference intervals.     ALT   Date Value Ref Range Status   04/15/2024 15 0 - 50 U/L Final     Comment:     Reference intervals for this test were updated on 6/12/2023 to more accurately reflect our healthy population. There may be differences in the flagging of prior results with similar values performed with this method. Interpretation of those prior results can be made in the context of the updated reference intervals.       AST   Date Value Ref Range Status   04/15/2024 23 0 - 45 U/L Final     Comment:     Reference intervals for this test were updated on 6/12/2023 to more accurately reflect our healthy population. There may be differences in the flagging of prior results with similar values performed with this method. Interpretation of those prior results can be made in the context of the updated reference intervals.             Lab Results   Component Value Date    A1C 5.2 12/03/2015     Recent Labs   Lab Test 03/24/21  1120   CHOL 151   HDL 51   LDL 86   TRIG 71      No recent labs.  /28/23 looked pretty normal upon review of report:  Linked Documents    View Image     Component Collected Lab   Upper GI Endoscopy 12/28/2023 12:05 PM [facility]  [address] [place], [place] [address]., [place] [address] [phone]     Endoscopy Department  _______________________________________________________________________________  Patient Name: Komal Parra           Procedure Date: 12/28/2023 12:05 PM  MRN: [id number]                       Account Number: [id number]  YOB: 1985              Admit Type: Outpatient  Age: 38                               Room: [facility] PROCEDURE ROOM 05  Gender: Female                        Note Status: Finalized  Attending MD: JANIE PEREZ MD,      Pause for the Cause: Pause for the cause   completed.  Total Sedation Time:                     _______________________________________________________________________________     Procedure:             Upper GI endoscopy  Indications:           Heartburn  Providers:             JANIE PEREZ MD, Lilly Menendez, RN, Kiara Hunter RN, Viola Gomez CRNA  Patient Profile:       This is a 38 year old female. Refer to note in patient                         chart for documentation of history and physical.  Referring MD:          SUDHEER ROONEY  Medicines:             Monitored Anesthesia Care  Complications:         No immediate complications. Estimated blood loss:                         Minimal.  _______________________________________________________________________________  Procedure:             Pre-Anesthesia Assessment:                         - Prior to the procedure, a History and Physical was                         performed, and patient medications and allergies were                         reviewed. The patient is competent. The risks and                         benefits of the procedure and the sedation options and                         risks were discussed with the patient. All questions                         were answered and informed consent was obtained.                         Patient identification and proposed procedure were                         verified by the physician and the nurse in the                         pre-procedure area. Mental Status Examination: alert                         and oriented. Airway Examination: normal oropharyngeal                         airway and neck mobility. Respiratory Examination:                         clear to auscultation. CV Examination: normal.                         Prophylactic Antibiotics: The patient does not require                         prophylactic antibiotics. Prior Anticoagulants: The                         patient has taken no anticoagulant or antiplatelet                         agents. ASA  Grade Assessment: II - A patient with mild                         systemic disease. After reviewing the risks and                         benefits, the patient was deemed in satisfactory                         condition to undergo the procedure. The anesthesia                         plan was to use monitored anesthesia care (MAC).                         Immediately prior to administration of medications,                         the patient was re-assessed for adequacy to receive                         sedatives. The heart rate, respiratory rate, oxygen                         saturations, blood pressure, adequacy of pulmonary                         ventilation, and response to care were monitored                         throughout the procedure. The physical status of the                         patient was re-assessed after the procedure.                         After obtaining informed consent, the endoscope was                         passed under direct vision. Throughout the procedure,                         the patient's blood pressure, pulse, and oxygen                         saturations were monitored continuously. The Endoscope                         was introduced through the mouth, and advanced to the                         third part of duodenum. The upper GI endoscopy was                         accomplished without difficulty. The patient tolerated                         the procedure well.                                                                                   Findings:       The Z-line was regular and was found 37 cm from the incisors.       Normal mucosa was found in the mid esophagus and in the distal       esophagus. Biopsies were taken with a cold forceps for histology.       The gastroesophageal flap valve was visualized endoscopically and       classified as Hill Grade III (minimal fold, loose to endoscope, hiatal       hernia likely).       The entire examined stomach was  normal.       The cardia and gastric fundus were normal on retroflexion.       The examined duodenum was normal.                                                                                   Impression:            - Z-line regular, 37 cm from the incisors.                         - Normal mucosa was found in the mid esophagus and in                         the distal esophagus. Biopsied.                         - Gastroesophageal flap valve classified as Hill Grade                         III (minimal fold, loose to endoscope, hiatal hernia                         likely).                         - Normal stomach.                         - Normal examined duodenum.  Recommendation:        - Await pathology results.                         - Discharge patient to home (with escort).                         - Resume previous diet.                         - Continue present medications.                         - Await pathology results.                         - Return to primary care physician as previously                         scheduled.                                                                                     Electronically Signed by: Dr. Janie Aldrich  ________________  JANIE ALDRICH MD  12/28/2023 1:12:41 PM  I was physically present for the entire viewing portion of the exam.  __________________________     Esophageal biopsy pathology was normal:  Surgical Pathology Exam: [id number]  Order: [id number]  Collected 12/28/2023  1:07 PM       Status: Final result       Visible to patient: Yes (seen)    0 Result Notes       1 Patient Communication       Component  Resulting Agency   Case Report   Surgical Pathology Report                         Case: [id number]                                   Authorizing Provider:  Janie Aldrich MD          Collected:           12/28/2023 01:07 PM           Ordering Location:     [facility] OR  Received:            12/28/2023 01:26 PM                        "           [place]                                                                   Pathologist:           Jorge Trujillo DO                                                             Specimens:   A) - Esophagus, Distal, distal esophagus biopsies                                                    B) - Esophagus, Mid, mid esophagus biopsies                                                Final Diagnosis   A.  ESOPHAGUS, DISTAL, BIOPSY:  - Unremarkable squamous mucosa  - No evidence of eosinophilic esophagitis     B.  ESOPHAGUS, MID, BIOPSY:  - Unremarkable squamous mucosa  - No evidence of eosinophilic esophagitis                   PHYSICAL EXAM:  Objective   /66   Temp 97.8  F (36.6  C) (Oral)   Ht 1.702 m (5' 7\")   Wt 98 kg (216 lb)   LMP 02/07/2024   BMI 33.83 kg/m    /66   Temp 97.8  F (36.6  C) (Oral)   Ht 1.702 m (5' 7\")   Wt 98 kg (216 lb)   LMP 02/07/2024   BMI 33.83 kg/m    Body mass index is 33.83 kg/m .  Physical Exam   GENERAL: alert and no distress  NECK: no adenopathy, no asymmetry, masses, or scars  RESP: lungs clear to auscultation - no rales, rhonchi or wheezes  CV: regular rate and rhythm, normal S1 S2, no S3 or S4, no murmur, click or rub, no peripheral edema  ABDOMEN: soft, nontender, no hepatosplenomegaly, no masses and bowel sounds normal  MS: no gross musculoskeletal defects noted, no edema        Sincerely,    Bradly James MD              Again, thank you for allowing me to participate in the care of your patient.        Sincerely,        Bradly James MD  "

## 2024-04-16 LAB
ALBUMIN SERPL BCG-MCNC: 4.2 G/DL (ref 3.5–5.2)
ALP SERPL-CCNC: 47 U/L (ref 40–150)
ALT SERPL W P-5'-P-CCNC: 15 U/L (ref 0–50)
ANION GAP SERPL CALCULATED.3IONS-SCNC: 10 MMOL/L (ref 7–15)
AST SERPL W P-5'-P-CCNC: 23 U/L (ref 0–45)
BILIRUB SERPL-MCNC: 0.5 MG/DL
BUN SERPL-MCNC: 11.7 MG/DL (ref 6–20)
CALCIUM SERPL-MCNC: 9.1 MG/DL (ref 8.6–10)
CHLORIDE SERPL-SCNC: 107 MMOL/L (ref 98–107)
CREAT SERPL-MCNC: 0.87 MG/DL (ref 0.51–0.95)
DEPRECATED HCO3 PLAS-SCNC: 24 MMOL/L (ref 22–29)
EGFRCR SERPLBLD CKD-EPI 2021: 88 ML/MIN/1.73M2
GLUCOSE SERPL-MCNC: 80 MG/DL (ref 70–99)
POTASSIUM SERPL-SCNC: 4.3 MMOL/L (ref 3.4–5.3)
PROT SERPL-MCNC: 6.8 G/DL (ref 6.4–8.3)
SODIUM SERPL-SCNC: 141 MMOL/L (ref 135–145)

## 2024-05-15 DIAGNOSIS — E66.09 CLASS 1 OBESITY DUE TO EXCESS CALORIES WITH BODY MASS INDEX (BMI) OF 33.0 TO 33.9 IN ADULT, UNSPECIFIED WHETHER SERIOUS COMORBIDITY PRESENT: Primary | ICD-10-CM

## 2024-05-15 DIAGNOSIS — E66.811 CLASS 1 OBESITY DUE TO EXCESS CALORIES WITH BODY MASS INDEX (BMI) OF 33.0 TO 33.9 IN ADULT, UNSPECIFIED WHETHER SERIOUS COMORBIDITY PRESENT: Primary | ICD-10-CM

## 2024-05-15 NOTE — PROGRESS NOTES
Lower efficacy with Lomaira and interested in trying compounded semaglutide. Will ramp to 1mg/week dose over 3 months if tolerated/affordable and plan to keep at that modest dose for best efficacy/value/affordability.   Bradly James MD

## 2024-07-26 ENCOUNTER — VIRTUAL VISIT (OUTPATIENT)
Dept: FAMILY MEDICINE | Facility: CLINIC | Age: 39
End: 2024-07-26
Payer: COMMERCIAL

## 2024-07-26 ENCOUNTER — TELEPHONE (OUTPATIENT)
Dept: FAMILY MEDICINE | Facility: CLINIC | Age: 39
End: 2024-07-26

## 2024-07-26 DIAGNOSIS — J02.9 SORE THROAT: Primary | ICD-10-CM

## 2024-07-26 PROCEDURE — 99213 OFFICE O/P EST LOW 20 MIN: CPT | Mod: 95 | Performed by: FAMILY MEDICINE

## 2024-07-26 ASSESSMENT — ENCOUNTER SYMPTOMS: SORE THROAT: 1

## 2024-07-26 NOTE — TELEPHONE ENCOUNTER
S-(situation): Patient requesting Dr. Montes De Oca send antibiotic to her pharmacy (pending).     B-(background): Patient had virtual video visit today for strep with Dr. Montes De Oca.    A-(assessment): Provider ordered strep test to have patient collect at lab, but patient states no [place] locations are close to her. Patient says she was told to message provider on [employer], but patient unable to send message to provider.     R-(recommendations): Will forward to Dr. Montes De Oca to advise on med request.     Thank you,  Corey Campbell, Triage RN [facility]  3:53 PM 7/26/2024

## 2024-07-26 NOTE — PROGRESS NOTES
Komal is a 38 year old who is being evaluated via a billable video visit.    How would you like to obtain your AVS? MyChart  If the video visit is dropped, the invitation should be resent by: Text to cell phone: [phone]  Will anyone else be joining your video visit? No      Assessment & Plan     (J02.9) Sore throat  (primary encounter diagnosis)  Comment:   Plan: Streptococcus A Rapid Screen w/Reflex to PCR -         Clinic Collect, amoxicillin-clavulanate         (AUGMENTIN) 875-125 MG tablet        Complaining of pain in throat , since 3 days pain getting worst   No fever .   Home COVID test negative .   No Fever and chills     Discussed possibility of strep throat .  Will get it tested .   I have send antibiotics if positive will start treatment .  Recommend to contact if pain fails to improve .      Subjective   Komal is a 38 year old, presenting for the following health issues:  Pharyngitis (Sore throat x 6 days, today feels she is swallowing razor blades. )    Pharyngitis          Acute Illness  Acute illness concerns: Sore Throat  Onset/Duration: 6 days  Symptoms:  Fever: No  Chills/Sweats: No  Headache (location?): No  Sinus Pressure: No  Conjunctivitis:  No  Ear Pain: no  Rhinorrhea: No  Congestion: No  Sore Throat: YES  Cough: no  Wheeze: No  Decreased Appetite: No  Nausea: No  Vomiting: No  Diarrhea: YES  Dysuria/Freq.: No  Dysuria or Hematuria: No  Fatigue/Achiness: No  Sick/Strep Exposure: YES- possible was at a concert  Therapies tried and outcome: None        Review of Systems  CONSTITUTIONAL: NEGATIVE for fever, chills, change in weight  INTEGUMENTARY/SKIN: NEGATIVE for worrisome rashes, moles or lesions  EYES: NEGATIVE for vision changes or irritation  ENT/MOUTH: Sore throat   No nasal congestion     RESP: NEGATIVE for significant cough or SOB    MUSCULOSKELETAL: NEGATIVE for significant arthralgias or myalgia  NEURO: NEGATIVE for weakness, dizziness or paresthesias  ENDOCRINE: NEGATIVE for  "temperature intolerance, skin/hair changes  HEME: NEGATIVE for bleeding problems  PSYCHIATRIC: NEGATIVE for changes in mood or affect      Objective    Vitals - Patient Reported  Weight (Patient Reported): 90.7 kg (200 lb)  Height (Patient Reported): 170.2 cm (5' 7\")  BMI (Based on Pt Reported Ht/Wt): 31.32  Pain Score: Extreme Pain (8)  Pain Loc: Other - see comment        Physical Exam   GENERAL: alert and no distress  EYES: Eyes grossly normal to inspection.  No discharge or erythema, or obvious scleral/conjunctival abnormalities.  RESP: No audible wheeze, cough, or visible cyanosis.    SKIN: Visible skin clear. No significant rash, abnormal pigmentation or lesions.  NEURO: Cranial nerves grossly intact.  Mentation and speech appropriate for age.  PSYCH: Appropriate affect, tone, and pace of words          Video-Visit Details    Type of service:  Video Visit   Originating Location (pt. Location): Home    Distant Location (provider location):  On-site  Platform used for Video Visit: John  Signed Electronically by: Mohini Montes De Oca MD    "

## 2024-07-27 ENCOUNTER — APPOINTMENT (OUTPATIENT)
Dept: LAB | Facility: CLINIC | Age: 39
End: 2024-07-27
Attending: FAMILY MEDICINE
Payer: COMMERCIAL

## 2024-07-27 LAB
DEPRECATED S PYO AG THROAT QL EIA: NEGATIVE
GROUP A STREP BY PCR: NOT DETECTED

## 2024-07-27 PROCEDURE — 87651 STREP A DNA AMP PROBE: CPT | Performed by: FAMILY MEDICINE

## 2024-08-23 ENCOUNTER — OFFICE VISIT (OUTPATIENT)
Dept: SURGERY | Facility: CLINIC | Age: 39
End: 2024-08-23
Payer: COMMERCIAL

## 2024-08-23 VITALS
WEIGHT: 197.7 LBS | HEIGHT: 67 IN | BODY MASS INDEX: 31.03 KG/M2 | SYSTOLIC BLOOD PRESSURE: 126 MMHG | DIASTOLIC BLOOD PRESSURE: 70 MMHG

## 2024-08-23 DIAGNOSIS — E66.811 CLASS 1 OBESITY DUE TO EXCESS CALORIES WITHOUT SERIOUS COMORBIDITY WITH BODY MASS INDEX (BMI) OF 31.0 TO 31.9 IN ADULT: ICD-10-CM

## 2024-08-23 DIAGNOSIS — E66.09 CLASS 1 OBESITY DUE TO EXCESS CALORIES WITHOUT SERIOUS COMORBIDITY WITH BODY MASS INDEX (BMI) OF 31.0 TO 31.9 IN ADULT: ICD-10-CM

## 2024-08-23 DIAGNOSIS — T50.905A DRUG-INDUCED NAUSEA AND VOMITING: Primary | ICD-10-CM

## 2024-08-23 DIAGNOSIS — R11.2 DRUG-INDUCED NAUSEA AND VOMITING: Primary | ICD-10-CM

## 2024-08-23 PROCEDURE — 99214 OFFICE O/P EST MOD 30 MIN: CPT | Performed by: EMERGENCY MEDICINE

## 2024-08-23 RX ORDER — ONDANSETRON 4 MG/1
4 TABLET, ORALLY DISINTEGRATING ORAL EVERY 12 HOURS PRN
Qty: 20 TABLET | Refills: 1 | Status: SHIPPED | OUTPATIENT
Start: 2024-08-23 | End: 2024-09-22

## 2024-08-23 NOTE — PATIENT INSTRUCTIONS
24 lbs down from peak this year, about 11 % total body weight reduction thus far.      Plan:   1.  Diet: aiming for 1500-1650kcal/day with her workout regimen.  Protein goal of 80-95grams of lean protein daily. Hydration with 80 oz of water daily and what you need on runs/bikes.  2. Exercise: continue fun training.  3. Medication: semaglutide 0.5mg/week to continue until tolerating it well, you can transition to the 0.75mg/week dose and then up to 1mg/week.   4.  No labs needed today.  5. Goals: down 11% aiming to get BMI under 27.5 this year.      [facility] is now offering compounded semaglutide during the time of Wegovy national shortage/limited supply. Semaglutide is the generic name of Wegovy. [facility] is following the highest standards for sterility and compounding practices. Not all compounding practices are equal. Therefore, [facility] will not be prescribing compounded semaglutide outside of the [facility]. Compounding of semaglutide is legal for as long as Wegovy is on the FDA's national shortage list. When/if Wegovy is taken off the FDA's shortage list, compounded semaglutide will no longer be legal to manufacture. When this occurs, patients will have to turn to acquiring Wegovy via its available manufactured pen, look into alternative weight loss medication(s), or stop the medication. Compound semaglutide will be available as a pre-filled syringe. Due to high demand of compounded semaglutide, orders may take 1-2 weeks to obtain from time of prescribing. Each dose of the medication will require a separate prescription.     As with any weight loss medication(s), there is a risk of weight regain should you stop semaglutide. It is important to be aware of this risk should you stop compounded semaglutide with no plans to transition back to an alternative injectable option as the use of semaglutide is intended for  "long term weight management with the intention of remaining on this injectable long term.        Obtaining Medication and Storage:   The pharmacy must speak to the patient directly prior to shipping medication to walk through administration, shipping and cost. Pre-filled syringes of compounded semaglutide and needles will be mailed from the compounding pharmacy to your home in a refrigerated box. The pre-filled syringes should be stored in the refrigerator until time of injection. The medication is good for at least 30 days in refrigerator.       Administration:   Wash your hands.   Obtain compound semaglutide syringe, needle and alcohol swab. Remove pre-filled syringe from refrigerator. Keep all other unused syringes in the refrigerator until time of use.   Inspect the syringe to ensure medication in syringe is clear/colorless and the clear shell cap on top of red syringe cap is intact.   Unlock the cap by pulling the clear outer shell straight off and remove the red syringe cap by twisting counter clockwise.   Attach needle to syringe by twisting needle onto syringe clockwise. Do not remove needle cap.   Choose injection site. Clean injection site with alcohol swab.    Appropriate areas of injection are: abdomen (at least 2 inches away from belly button) or front middle thigh.   Remove needle cap from syringe/needle.   Hold the syringe like a pencil. Insert the needle into skin at a 90-degree angle.   Using your pointer finger, push the syringe plunger down to inject the medicine.   Count to six. Then, remove the syringe from your skin.   Immediately place the syringe in a sharps container.   You can purchase a sharps container from your local pharmacy or [employer]. If you don't have a sharps container, you can use a plastic detergent container with a lid. The container should seal tightly, hold objects without leaking, breaking or cracking, and clearly be labelled \"sharps\".     Compounded Semaglutide monthly (4 " pre-filled syringes) cost:   0.25 mg ~$222   0.5 mg ~$260   1 mg ~$306   1.5 mg ~$342   2 mg ~$395   2.5 mg ~$438     [facility] Phone:  [phone]       Wegovy (semaglutide) is a very effective satiety boosting appetite suppressant. It needs to be ramped up slowly to be tolerated adequately.  About 1/10 people will not tolerate this medication. Each month, you move up to a higher dose until eventually reaching the 2.4mg/week dose over 5 months, if tolerating the ramping process well. When in plentiful supply, one try at re-ramping is recommended if the initial ramping has too many side effects/isn't tolerated well and if that attempt at  re-ramping isn't tolerated well, it's best to find an alternative.       Wegovy starts at 0.25mg/week for 4 weeks then increases to 0.5mg/week for 4 weeks then 1mg/week for 4 weeks then 1.7mg/week for 4 weeksthen 2.4mg/week usually for 6-9 months if tolerated well.  Injections can be given after cleansing the skin with alcohol prep pad or swab (available OTC).  Warming to room temperature 20-60 minutes prior to injection by placing on a table/counter reduces potential irritation at the time of injection.    Stop Wegovy if severe abdominal pain/vomiting/rash/throat swelling or constant nausea that prevents adequate food/water intake. Stop 2-3 weeks prior to any planned general anesthesia surgeries to reduce risk for something called a post operative ileus. If unexpected illness/injury that requires surgery, plan to go off Wegovy until fully recovered.    Gallstones can occur in about 1% of patients on this medication so update me if increase right upper abdominal pain after eating.     Start meals with protein first, separate beverages from meals by 20 minutes and work hard in between meals to get your 64-75 oz of water daily to reduce risks for severe constipation. Consider a fiber supplement like powdered psyllium husk in 12 oz water each night.    For  Prevention and Treatment of Constipation when on Semaglutide     From least aggressive to most aggressive:     Move: Wallking is essential - the more we move, the more our bowels move  Water: Drink water - 64oz-80oz per day suits most people well. About an one ounce of water per gram of protein eaten.  Go when you need to go. Don't wait. The longer you wait, the harder it gets.  Fiber: Fruit, raw veggies, nuts, whole grains, prune each night, flax/raj seeds added into meals can all be helpful.   Stool Softeners: if constipation is mild and for maintenance  Gentle laxatives: Miralax, senokot, dulcolax , Smooth move tea as needed     More aggressive (and typically won't get to this point)  Milk of Magnesia to empty out. Don't go anywhere far from a toilet for 6 hours.  Mag Citrate (what you drink before a colonoscopy).   Suppositories  Enema      Check out [employer] for patient resources.      If you have weekends off, I recommend dosing Thursday or Friday evenings for best control over weekends and ability to ride out some transient side effects should they occur.    Some people starve on this medication if not mindful about food intake. I recommend starting meals with the protein part of your meal first, chew thoroughly and separate beverages from meals by about 20 minutes to make sure you get your nourishment in first. Include vegetables/complex carbohydrates and unsaturated fat as part of your balanced diet but group these at the end of the meal, after protein is mostly gone. Satiety will kick in too early if drinking too much with meals and under nourishment can result.  If under nourished, more muscle mass losses and metabolic slowing will result and work against us in the long run.    It's not a bad idea to take a complete multivitamin most days of the week if using this medication. Low Iron formulas may be less constipating.    Pancreatitis is a very rare but potentially serious side effect. Stop Wegovy if  severe mid abdominal pain/burning in nature or if unable to eat/drink due to severe nausea/discomfort.   People with strong history of pancreatitis without clear cause should stay clear of this medication as should those with strong personal or family history for medullary thyroid cancer or Multiple Endocrine Neoplasia (rare).     Kind Regards,  Bradly James MD  [facility]

## 2024-08-23 NOTE — LETTER
8/23/2024      Kmoal Parra  [address] [address]      Dear Colleague,    Thank you for referring your patient, Komal Parra, to the [facility] AND [facility] CARE [place]. Please see a copy of my visit note below.    Bariatric Clinic Follow-Up Visit:    Komal Parra is a 39 year old  female with Body mass index is 30.96 kg/m .  presenting here today for follow-up on non-surgical efforts for weight loss. Original Intake visit occurred on 4/15/24 with a weight of 216 lbs and BMI of 33.8.  Along with diet and behavior changes, she has been using Lomaira initially to assist her weight loss goals but found early loss in efficacy for her and she was interested in compounded semaglutide options in May of '24 and ramping to 1mg/week dose was initiated. She's up to 0.5mg/week as of our 8/23/24 visit with some nausea at times so Zofran prn prescribed.  See her intake visit notes for details on identified contributors to weight gain in the past. Chart review shows no dietician visit occurred in our system but followed at [facility].     Weight:   Wt Readings from Last 5 Encounters:   08/23/24 89.7 kg (197 lb 11.2 oz)   04/15/24 98 kg (216 lb)   02/21/24 99.8 kg (220 lb)   01/18/24 100.3 kg (221 lb 3.2 oz)   12/28/23 90.7 kg (200 lb)    pounds    24 lbs down from peak this year, about 11 % total body weight reduction thus far.  Comorbidities:  Patient Active Problem List   Diagnosis     Anxiety     Gastroesophageal reflux disease without esophagitis     Hiatal hernia     Class 1 obesity due to excess calories with serious comorbidity and body mass index (BMI) of 34.0 to 34.9 in adult     Generalized anxiety disorder       Current Outpatient Medications:      Apple Cider Vinegar 600 MG CAPS, Take 2 capsules by mouth, Disp: , Rfl:      CALCIUM PO, , Disp: , Rfl:      citalopram (CELEXA) 10 MG tablet, Take 1 tablet (10 mg) by mouth every morning, Disp: 90  tablet, Rfl: 3     COMPOUNDED NON-CONTROLLED SUBSTANCE (CMPD RX) - PHARMACY TO MIX COMPOUNDED MEDICATION, Ramp Compounded Semaglutide: 1. Start 0.25mg/week Semaglutide for 4 weeks then increase if tolerated to 0.5mg/week for 2months then increase to 1mg/week if tolerated/affordable. Refill 1mg/week each month for 6 months., Disp: 2 mL, Rfl: 0     Digestive Enzymes (DIGESTIVE ENZYME PO), , Disp: , Rfl:      EPINEPHrine (ANY BX GENERIC EQUIV) 0.3 MG/0.3ML injection 2-pack, INJECT 0.3ML (0.3MG) INTO THE SHOULDER,THIGH, OR BUTTOCKS ONCE FOR 1 DOSE, Disp: , Rfl:      ferrous sulfate dried 160 (50 Fe) MG tablet, Take 1 tablet by mouth daily (with breakfast), Disp: , Rfl:      levonorgestrel (MIRENA, 52 MG,) 52 MG (20 mcg/day) IUD, 1 Device by Intrauterine route, Disp: , Rfl:      omeprazole (PRILOSEC) 20 MG DR capsule, TAKE 1 CAPSULE BY MOUTH TWICE A DAY, Disp: 180 capsule, Rfl: 2     ondansetron (ZOFRAN ODT) 4 MG ODT tab, Take 1 tablet (4 mg) by mouth every 12 hours as needed for nausea., Disp: 20 tablet, Rfl: 1     vitamin C with B complex (B COMPLEX-C) tablet, Take 1 tablet by mouth daily, Disp: , Rfl:      Vitamin D, Cholecalciferol, 25 MCG (1000 UT) CAPS, , Disp: , Rfl:       Interim: Since our last visit, she has continued losing weight.   Tracking regularly.   0.5mg/week semaglutide with some nausea day 2 after injection, often in the night. Only one episode of vomiting. Found some left over Zofran helpful.   Alternating diarrhea/constipation.   Triathlon last weekend. Duathlon coming up.   Training right now: 5 days weekly either running for 45 minutes or 10-15mile bike ride. Has about 600kcal workouts.   Plan:   1.  Diet: aiming for 1500-1650kcal/day with her workout regimen.  Protein goal of 80-95grams of lean protein daily. Hydration with 80 oz of water daily and what you need on runs/bikes.  2. Exercise: continue fun training.  3. Medication: semaglutide 0.5mg/week to continue until tolerating it well, you can  "transition to the 0.75mg/week dose and then up to 1mg/week.   4.  No labs needed today.  5. Goals: down 11% aiming to get BMI under 27.5 this year.      We discussed [facility] including:  -eating 3 meals daily  -reviewed metabolic needs for weight loss based on Resting Metabolic Rate  -protein goals supportive of healthy weight loss  -avoiding/limiting calorie containing beverages  -We discussed the importance of restorative sleep and stress management in maintaining a healthy weight.  -We discussed the National Weight Control Registry healthy weight maintenance strategies and ways to optimize metabolism.  -We discussed the importance of physical activity including cardiovascular and strength training in maintaining a healthier weight and explored viable options.      Most recent labs:  Lab Results   Component Value Date    WBC 6.0 01/18/2024    HGB 13.3 01/18/2024    HCT 39.7 01/18/2024    MCV 95 01/18/2024     01/18/2024     Lab Results   Component Value Date    CHOL 151 03/24/2021     Lab Results   Component Value Date    HDL 51 03/24/2021     No components found for: \"LDLCALC\"  Lab Results   Component Value Date    TRIG 71 03/24/2021     No results found for: \"CHOLHDL\"  Lab Results   Component Value Date    ALT 15 04/15/2024    AST 23 04/15/2024    ALKPHOS 47 04/15/2024     No results found for: \"HGBA1C\"  No results found for: \"B12\"  No components found for: \"VITDT1\"  No results found for: \"LYNNE\"  No results found for: \"PTHI\"  No results found for: \"ZN\"  No results found for: \"VIB1WB\"  Lab Results   Component Value Date    TSH 1.99 02/15/2019     No results found for: \"TEST\"    DIETARY HISTORY  Tracking technique: workouts. Works with [employer]  Positive Changes Since Last Visit: completed sprint Tri  Struggling With: some nausea day 2 after injections.     Getting Adequate Protein: yes  Sleep schedule: good.      PHYSICAL ACTIVITY PATTERNS:  Cardiovascular: " "bike/run  Strength Training: strength training.    REVIEW OF SYSTEMS  .  PHYSICAL EXAM:  Vitals: /70 (BP Location: Right arm, Patient Position: Sitting, Cuff Size: Adult Small)   Ht 1.702 m (5' 7\")   Wt 89.7 kg (197 lb 11.2 oz)   BMI 30.96 kg/m    Weight:   Wt Readings from Last 3 Encounters:   08/23/24 89.7 kg (197 lb 11.2 oz)   04/15/24 98 kg (216 lb)   02/21/24 99.8 kg (220 lb)         GEN: Pleasant, well groomed, in no acute distress  HEENT:  normal facies .  NECK: No swelling.  HEART: .  LUNGS: No respiratory difficulty noted. No cough. .  ABDOMEN: .  EXTREMITIES: No tremor. Ambulation is independent..  NEURO: Alert and Oriented X3, fluent speech. .  SKIN: No visible rashes. .    Interim study results: ferritin of 25 in July at [facility]. .      35 minutes spent by me on the date of the encounter doing chart review, history and exam, documentation and further activities per the note   Bradly James MD  [facility]  7:47 AM  8/23/2024      Again, thank you for allowing me to participate in the care of your patient.        Sincerely,        Bradly James MD  "

## 2024-08-23 NOTE — PROGRESS NOTES
Bariatric Clinic Follow-Up Visit:    Komal Parra is a 39 year old  female with Body mass index is 30.96 kg/m .  presenting here today for follow-up on non-surgical efforts for weight loss. Original Intake visit occurred on 4/15/24 with a weight of 216 lbs and BMI of 33.8.  Along with diet and behavior changes, she has been using Lomaira initially to assist her weight loss goals but found early loss in efficacy for her and she was interested in compounded semaglutide options in May of '24 and ramping to 1mg/week dose was initiated. She's up to 0.5mg/week as of our 8/23/24 visit with some nausea at times so Zofran prn prescribed.  See her intake visit notes for details on identified contributors to weight gain in the past. Chart review shows no dietician visit occurred in our system but followed at [facility].     Weight:   Wt Readings from Last 5 Encounters:   08/23/24 89.7 kg (197 lb 11.2 oz)   04/15/24 98 kg (216 lb)   02/21/24 99.8 kg (220 lb)   01/18/24 100.3 kg (221 lb 3.2 oz)   12/28/23 90.7 kg (200 lb)    pounds    24 lbs down from peak this year, about 11 % total body weight reduction thus far.  Comorbidities:  Patient Active Problem List   Diagnosis    Anxiety    Gastroesophageal reflux disease without esophagitis    Hiatal hernia    Class 1 obesity due to excess calories with serious comorbidity and body mass index (BMI) of 34.0 to 34.9 in adult    Generalized anxiety disorder       Current Outpatient Medications:     Apple Cider Vinegar 600 MG CAPS, Take 2 capsules by mouth, Disp: , Rfl:     CALCIUM PO, , Disp: , Rfl:     citalopram (CELEXA) 10 MG tablet, Take 1 tablet (10 mg) by mouth every morning, Disp: 90 tablet, Rfl: 3    COMPOUNDED NON-CONTROLLED SUBSTANCE (CMPD RX) - PHARMACY TO MIX COMPOUNDED MEDICATION, Ramp Compounded Semaglutide: 1. Start 0.25mg/week Semaglutide for 4 weeks then increase if tolerated to 0.5mg/week for 2months then increase to 1mg/week if tolerated/affordable.  Refill 1mg/week each month for 6 months., Disp: 2 mL, Rfl: 0    Digestive Enzymes (DIGESTIVE ENZYME PO), , Disp: , Rfl:     EPINEPHrine (ANY BX GENERIC EQUIV) 0.3 MG/0.3ML injection 2-pack, INJECT 0.3ML (0.3MG) INTO THE SHOULDER,THIGH, OR BUTTOCKS ONCE FOR 1 DOSE, Disp: , Rfl:     ferrous sulfate dried 160 (50 Fe) MG tablet, Take 1 tablet by mouth daily (with breakfast), Disp: , Rfl:     levonorgestrel (MIRENA, 52 MG,) 52 MG (20 mcg/day) IUD, 1 Device by Intrauterine route, Disp: , Rfl:     omeprazole (PRILOSEC) 20 MG DR capsule, TAKE 1 CAPSULE BY MOUTH TWICE A DAY, Disp: 180 capsule, Rfl: 2    ondansetron (ZOFRAN ODT) 4 MG ODT tab, Take 1 tablet (4 mg) by mouth every 12 hours as needed for nausea., Disp: 20 tablet, Rfl: 1    vitamin C with B complex (B COMPLEX-C) tablet, Take 1 tablet by mouth daily, Disp: , Rfl:     Vitamin D, Cholecalciferol, 25 MCG (1000 UT) CAPS, , Disp: , Rfl:       Interim: Since our last visit, she has continued losing weight.   Tracking regularly.   0.5mg/week semaglutide with some nausea day 2 after injection, often in the night. Only one episode of vomiting. Found some left over Zofran helpful.   Alternating diarrhea/constipation.   Triathlon last weekend. Duathlon coming up.   Training right now: 5 days weekly either running for 45 minutes or 10-15mile bike ride. Has about 600kcal workouts.   Plan:   1.  Diet: aiming for 1500-1650kcal/day with her workout regimen.  Protein goal of 80-95grams of lean protein daily. Hydration with 80 oz of water daily and what you need on runs/bikes.  2. Exercise: continue fun training.  3. Medication: semaglutide 0.5mg/week to continue until tolerating it well, you can transition to the 0.75mg/week dose and then up to 1mg/week.   4.  No labs needed today.  5. Goals: down 11% aiming to get BMI under 27.5 this year.      We discussed [facility] Bariat[facility] Basics including:  -eating 3 meals daily  -reviewed metabolic needs for weight loss based on Resting  "Metabolic Rate  -protein goals supportive of healthy weight loss  -avoiding/limiting calorie containing beverages  -We discussed the importance of restorative sleep and stress management in maintaining a healthy weight.  -We discussed the National Weight Control Registry healthy weight maintenance strategies and ways to optimize metabolism.  -We discussed the importance of physical activity including cardiovascular and strength training in maintaining a healthier weight and explored viable options.      Most recent labs:  Lab Results   Component Value Date    WBC 6.0 01/18/2024    HGB 13.3 01/18/2024    HCT 39.7 01/18/2024    MCV 95 01/18/2024     01/18/2024     Lab Results   Component Value Date    CHOL 151 03/24/2021     Lab Results   Component Value Date    HDL 51 03/24/2021     No components found for: \"LDLCALC\"  Lab Results   Component Value Date    TRIG 71 03/24/2021     No results found for: \"CHOLHDL\"  Lab Results   Component Value Date    ALT 15 04/15/2024    AST 23 04/15/2024    ALKPHOS 47 04/15/2024     No results found for: \"HGBA1C\"  No results found for: \"B12\"  No components found for: \"VITDT1\"  No results found for: \"LYNNE\"  No results found for: \"PTHI\"  No results found for: \"ZN\"  No results found for: \"VIB1WB\"  Lab Results   Component Value Date    TSH 1.99 02/15/2019     No results found for: \"TEST\"    DIETARY HISTORY  Tracking technique: workouts. Works with [employer]  Positive Changes Since Last Visit: completed sprint Tri  Struggling With: some nausea day 2 after injections.     Getting Adequate Protein: yes  Sleep schedule: good.      PHYSICAL ACTIVITY PATTERNS:  Cardiovascular: bike/run  Strength Training: strength training.    REVIEW OF SYSTEMS  .  PHYSICAL EXAM:  Vitals: /70 (BP Location: Right arm, Patient Position: Sitting, Cuff Size: Adult Small)   Ht 1.702 m (5' 7\")   Wt 89.7 kg (197 lb 11.2 oz)   BMI 30.96 kg/m    Weight:   Wt Readings from Last 3 Encounters: "   08/23/24 89.7 kg (197 lb 11.2 oz)   04/15/24 98 kg (216 lb)   02/21/24 99.8 kg (220 lb)         GEN: Pleasant, well groomed, in no acute distress  HEENT:  normal facies .  NECK: No swelling.  HEART: .  LUNGS: No respiratory difficulty noted. No cough. .  ABDOMEN: .  EXTREMITIES: No tremor. Ambulation is independent..  NEURO: Alert and Oriented X3, fluent speech. .  SKIN: No visible rashes. .    Interim study results: ferritin of 25 in July at [facility]. .      35 minutes spent by me on the date of the encounter doing chart review, history and exam, documentation and further activities per the note   Bradly James MD  [facility]  7:47 AM  8/23/2024

## 2025-01-13 DIAGNOSIS — E66.811 CLASS 1 OBESITY DUE TO EXCESS CALORIES WITH BODY MASS INDEX (BMI) OF 33.0 TO 33.9 IN ADULT, UNSPECIFIED WHETHER SERIOUS COMORBIDITY PRESENT: ICD-10-CM

## 2025-01-13 DIAGNOSIS — E66.09 CLASS 1 OBESITY DUE TO EXCESS CALORIES WITH BODY MASS INDEX (BMI) OF 33.0 TO 33.9 IN ADULT, UNSPECIFIED WHETHER SERIOUS COMORBIDITY PRESENT: ICD-10-CM

## 2025-01-13 NOTE — TELEPHONE ENCOUNTER
Patient said that she needs a refill on the 1mg dose of the compounded semaglutide that her and Dr. James discussed at her last visit.    Adelaida Gu RN

## 2025-02-23 SDOH — HEALTH STABILITY: PHYSICAL HEALTH: ON AVERAGE, HOW MANY MINUTES DO YOU ENGAGE IN EXERCISE AT THIS LEVEL?: 40 MIN

## 2025-02-23 SDOH — HEALTH STABILITY: PHYSICAL HEALTH: ON AVERAGE, HOW MANY DAYS PER WEEK DO YOU ENGAGE IN MODERATE TO STRENUOUS EXERCISE (LIKE A BRISK WALK)?: 5 DAYS

## 2025-02-23 ASSESSMENT — SOCIAL DETERMINANTS OF HEALTH (SDOH): HOW OFTEN DO YOU GET TOGETHER WITH FRIENDS OR RELATIVES?: ONCE A WEEK

## 2025-02-24 ENCOUNTER — OFFICE VISIT (OUTPATIENT)
Dept: FAMILY MEDICINE | Facility: CLINIC | Age: 40
End: 2025-02-24
Attending: FAMILY MEDICINE
Payer: COMMERCIAL

## 2025-02-24 VITALS
HEIGHT: 67 IN | OXYGEN SATURATION: 96 % | TEMPERATURE: 97.9 F | DIASTOLIC BLOOD PRESSURE: 59 MMHG | SYSTOLIC BLOOD PRESSURE: 112 MMHG | RESPIRATION RATE: 12 BRPM | HEART RATE: 74 BPM | WEIGHT: 173.2 LBS | BODY MASS INDEX: 27.18 KG/M2

## 2025-02-24 DIAGNOSIS — Z82.79 FAMILY HISTORY OF CONGENITAL HEART DEFECT: ICD-10-CM

## 2025-02-24 DIAGNOSIS — F41.9 ANXIETY: ICD-10-CM

## 2025-02-24 DIAGNOSIS — K21.9 GASTROESOPHAGEAL REFLUX DISEASE WITHOUT ESOPHAGITIS: ICD-10-CM

## 2025-02-24 DIAGNOSIS — Z97.5 PRESENCE OF MIRENA IUD: ICD-10-CM

## 2025-02-24 DIAGNOSIS — Z00.00 ROUTINE GENERAL MEDICAL EXAMINATION AT A HEALTH CARE FACILITY: Primary | ICD-10-CM

## 2025-02-24 DIAGNOSIS — Z13.220 LIPID SCREENING: ICD-10-CM

## 2025-02-24 LAB
ERYTHROCYTE [DISTWIDTH] IN BLOOD BY AUTOMATED COUNT: 11.9 % (ref 10–15)
HCT VFR BLD AUTO: 41.6 % (ref 35–47)
HGB BLD-MCNC: 13.8 G/DL (ref 11.7–15.7)
MCH RBC QN AUTO: 31.3 PG (ref 26.5–33)
MCHC RBC AUTO-ENTMCNC: 33.2 G/DL (ref 31.5–36.5)
MCV RBC AUTO: 94 FL (ref 78–100)
PLATELET # BLD AUTO: 262 10E3/UL (ref 150–450)
RBC # BLD AUTO: 4.41 10E6/UL (ref 3.8–5.2)
WBC # BLD AUTO: 4.5 10E3/UL (ref 4–11)

## 2025-02-24 PROCEDURE — 80053 COMPREHEN METABOLIC PANEL: CPT | Performed by: FAMILY MEDICINE

## 2025-02-24 PROCEDURE — 99214 OFFICE O/P EST MOD 30 MIN: CPT | Mod: 25 | Performed by: FAMILY MEDICINE

## 2025-02-24 PROCEDURE — 85027 COMPLETE CBC AUTOMATED: CPT | Performed by: FAMILY MEDICINE

## 2025-02-24 PROCEDURE — 36415 COLL VENOUS BLD VENIPUNCTURE: CPT | Performed by: FAMILY MEDICINE

## 2025-02-24 PROCEDURE — G2211 COMPLEX E/M VISIT ADD ON: HCPCS | Performed by: FAMILY MEDICINE

## 2025-02-24 PROCEDURE — 80061 LIPID PANEL: CPT | Performed by: FAMILY MEDICINE

## 2025-02-24 PROCEDURE — 99395 PREV VISIT EST AGE 18-39: CPT | Performed by: FAMILY MEDICINE

## 2025-02-24 RX ORDER — CITALOPRAM HYDROBROMIDE 10 MG/1
10 TABLET ORAL EVERY MORNING
Qty: 90 TABLET | Refills: 3 | Status: SHIPPED | OUTPATIENT
Start: 2025-02-24 | End: 2025-02-24

## 2025-02-24 RX ORDER — OMEPRAZOLE 20 MG/1
20 CAPSULE, DELAYED RELEASE ORAL
Qty: 90 CAPSULE | Refills: 3 | Status: SHIPPED | OUTPATIENT
Start: 2025-02-24

## 2025-02-24 RX ORDER — OMEPRAZOLE 20 MG/1
20 CAPSULE, DELAYED RELEASE ORAL
Qty: 90 CAPSULE | Refills: 3 | Status: SHIPPED | OUTPATIENT
Start: 2025-02-24 | End: 2025-02-24

## 2025-02-24 RX ORDER — CITALOPRAM HYDROBROMIDE 10 MG/1
10 TABLET ORAL EVERY MORNING
Qty: 90 TABLET | Refills: 3 | Status: SHIPPED | OUTPATIENT
Start: 2025-02-24

## 2025-02-24 NOTE — PROGRESS NOTES
"Preventive Care Visit  [facility]  Dorota Guzman DO, Family Medicine  Feb 24, 2025      Assessment & Plan     Routine general medical examination at a health care facility  BMI  Estimated body mass index is 26.99 kg/m  as calculated from the following:    Height as of this encounter: 1.706 m (5' 7.17\").    Weight as of this encounter: 78.6 kg (173 lb 3.2 oz).   Weight management plan: Patient referred to endocrine and/or weight management specialty Discussed healthy diet and exercise guidelines    Counseling  Appropriate preventive services were addressed with this patient via screening, questionnaire, or discussion as appropriate for fall prevention, nutrition, physical activity, social engagement, weight loss and cognition.  Checklist reviewing preventive services available has been given to the patient.  Reviewed patient's diet, addressing concerns and/or questions.   - CBC with platelets  - Comprehensive metabolic panel (BMP + Alb, Alk Phos, ALT, AST, Total. Bili, TP)    Gastroesophageal reflux disease without esophagitis  Reduce omeprazole from twice daily to daily with appropriate symptom management.  Family history significant for father with esophageal cancer.  Reviewed 12/2023 upper EGD, due for repeat screening EGD 12/2025 order placed.  - omeprazole (PRILOSEC) 20 MG DR capsule; Take 1 capsule (20 mg) by mouth every morning (before breakfast).  - Adult GI  Referral - Procedure Only; Future    Lipid screening  - Lipid Profile (Chol, Trig, HDL, LDL calc)    Anxiety  Stable on Celexa.  Refills provided.  - citalopram (CELEXA) 10 MG tablet; Take 1 tablet (10 mg) by mouth every morning.    Presence of Mirena IUD  Started developing breakthrough bleeding with Mirena IUD placed 2020.  She will schedule an IUD replacement with Pap smear for future visit.  Spouse history of vasectomy.    Family history of congenital heart defect  Recently discovered mother has congenital ASD " and severely enlarged left atrium.  Suspect this is due to comorbid conditions, but will rule out genetic linked structural heart disease with echocardiogram.  - Echocardiogram Complete; Future    Patient has been advised of split billing requirements and indicates understanding: Yes    The longitudinal plan of care for the diagnosis(es)/condition(s) as documented were addressed during this visit. Due to the added complexity in care, I will continue to support Komal in the subsequent management and with ongoing continuity of care.      Subjective   Komal is a 39 year old, presenting for the following:  Annual Visit (Not fasting - labs, questions about next endoscopy, questions about mirena)        2/24/2025     8:55 AM   Additional Questions   Roomed by Carlie RAMSEY CMA   Accompanied by self          HPI    Throat clearing, reflux when forgetting to take omeprazole.  EGD 12/2023, wondering about follow-up    Mom severe atrial enlargement and asd. Undergoing back surgery at [place]. Vasovagal presyncope with standing up. Once cup of coffee per day     Constipation/diarrhea with semaglutide. On ferritin.  Breakthrough bleeding with Mirena IUD.  Placed 2020.   with history of vasectomy so using this for menorrhagia symptoms.  2 prior vaginal deliveries.    Health Care Directive  Patient does not have a Health Care Directive: Discussed advance care planning with patient; information given to patient to review.      2/23/2025   General Health   How would you rate your overall physical health? Good   Feel stress (tense, anxious, or unable to sleep) Not at all         2/23/2025   Nutrition   Three or more servings of calcium each day? Yes   Diet: Other   If other, please elaborate: No red meat, no fried food   How many servings of fruit and vegetables per day? (!) 2-3   How many sweetened beverages each day? 0-1         2/23/2025   Exercise   Days per week of moderate/strenous exercise 5 days   Average minutes spent  exercising at this level 40 min         2/23/2025   Social Factors   Frequency of gathering with friends or relatives Once a week   Worry food won't last until get money to buy more No   Food not last or not have enough money for food? No   Do you have housing? (Housing is defined as stable permanent housing and does not include staying ouside in a car, in a tent, in an abandoned building, in an overnight shelter, or couch-surfing.) Yes   Are you worried about losing your housing? No   Lack of transportation? No   Unable to get utilities (heat,electricity)? No         2/23/2025   Dental   Dentist two times every year? Yes         2/14/2024   TB Screening   Were you born outside of the [place]? No         Today's PHQ-2 Score:       2/23/2025    10:26 PM   PHQ-2 ( 1999 [employer])   Q1: Little interest or pleasure in doing things 0   Q2: Feeling down, depressed or hopeless 0   PHQ-2 Score 0    Q1: Little interest or pleasure in doing things Not at all   Q2: Feeling down, depressed or hopeless Not at all   PHQ-2 Score 0       Patient-reported           2/23/2025   Substance Use   Alcohol more than 3/day or more than 7/wk No   Do you use any other substances recreationally? No     Social History     Tobacco Use    Smoking status: Never     Passive exposure: Never    Smokeless tobacco: Never   Vaping Use    Vaping status: Never Used   Substance Use Topics    Alcohol use: No    Drug use: No           1/18/2024   LAST FHS-7 RESULTS   1st degree relative breast or ovarian cancer No   Any relative bilateral breast cancer No   Any male have breast cancer No   Any ONE woman have BOTH breast AND ovarian cancer Yes   Any woman with breast cancer before 50yrs Yes   2 or more relatives with breast AND/OR ovarian cancer No   2 or more relatives with breast AND/OR bowel cancer No        Mammogram Screening - Patient under 40 years of age: Routine Mammogram Screening not recommended.         2/23/2025   STI Screening   New sexual partner(s)  "since last STI/HIV test? No     History of abnormal Pap smear: No - age 30- 64 PAP with HPV every 5 years recommended        Latest Ref Rng & Units 3/24/2021    11:11 AM 2/14/2019     2:37 PM 12/3/2015     3:54 PM   PAP / HPV   PAP Negative for squamous intraepithelial lesion or malignancy. Negative for squamous intraepithelial lesion or malignancy  Electronically signed by Malcolm Montague MD on 4/1/2021 at  1:45 PM    Negative for squamous intraepithelial lesion or malignancy  Electronically signed by Bibi Castorena CT (ASCP) on 2/22/2019 at 12:18 PM    Negative for squamous intraepithelial lesion or malignancy  Electronically signed by Kirsten Gould CT (ASCP) on 12/21/2015 at  1:57 PM      HPV 16 DNA NEG Negative  Negative     HPV 18 DNA NEG Negative  Negative     Other HR HPV NEG Negative  Negative             2/23/2025   Contraception/Family Planning   Questions about contraception or family planning No        Reviewed and updated as needed this visit by Provider   Tobacco  Allergies  Meds  Problems  Med Hx  Surg Hx  Fam Hx               Objective    Exam  /59   Pulse 74   Temp 97.9  F (36.6  C) (Oral)   Resp 12   Ht 1.706 m (5' 7.17\")   Wt 78.6 kg (173 lb 3.2 oz)   SpO2 96%   BMI 26.99 kg/m     Estimated body mass index is 26.99 kg/m  as calculated from the following:    Height as of this encounter: 1.706 m (5' 7.17\").    Weight as of this encounter: 78.6 kg (173 lb 3.2 oz).    Physical Exam  GENERAL: alert and no distress  EYES: Eyes grossly normal to inspection, PERRL and conjunctivae and sclerae normal  HENT: ear canals and TM's normal, nose and mouth without ulcers or lesions  NECK: no adenopathy, no asymmetry, masses, or scars  RESP: lungs clear to auscultation - no rales, rhonchi or wheezes  CV: regular rate and rhythm, normal S1 S2, no S3 or S4, no murmur, click or rub, no peripheral edema  ABDOMEN: soft, nontender  MS: no gross musculoskeletal defects noted, no " edema  SKIN: no suspicious lesions or rashes  NEURO: Normal strength and tone, mentation intact and speech normal  PSYCH: mentation appears normal, affect normal/bright        Signed Electronically by: Dorota Guzman DO

## 2025-02-24 NOTE — PATIENT INSTRUCTIONS
Patient Education   Preventive Care Advice   This is general advice given by our system to help you stay healthy. However, your care team may have specific advice just for you. Please talk to your care team about your preventive care needs.  Nutrition  Eat 5 or more servings of fruits and vegetables each day.  Try wheat bread, brown rice and whole grain pasta (instead of white bread, rice, and pasta).  Get enough calcium and vitamin D. Check the label on foods and aim for 100% of the RDA (recommended daily allowance).  Lifestyle  Exercise at least 150 minutes each week  (30 minutes a day, 5 days a week).  Do muscle strengthening activities 2 days a week. These help control your weight and prevent disease.  No smoking.  Wear sunscreen to prevent skin cancer.  Have a dental exam and cleaning every 6 months.  Yearly exams  See your health care team every year to talk about:  Any changes in your health.  Any medicines your care team has prescribed.  Preventive care, family planning, and ways to prevent chronic diseases.  Shots (vaccines)   HPV shots (up to age 26), if you've never had them before.  Hepatitis B shots (up to age 59), if you've never had them before.  COVID-19 shot: Get this shot when it's due.  Flu shot: Get a flu shot every year.  Tetanus shot: Get a tetanus shot every 10 years.  Pneumococcal, hepatitis A, and RSV shots: Ask your care team if you need these based on your risk.  Shingles shot (for age 50 and up)  General health tests  Diabetes screening:  Starting at age 35, Get screened for diabetes at least every 3 years.  If you are younger than age 35, ask your care team if you should be screened for diabetes.  Cholesterol test: At age 39, start having a cholesterol test every 5 years, or more often if advised.  Bone density scan (DEXA): At age 50, ask your care team if you should have this scan for osteoporosis (brittle bones).  Hepatitis C: Get tested at least once in your life.  STIs (sexually  transmitted infections)  Before age 24: Ask your care team if you should be screened for STIs.  After age 24: Get screened for STIs if you're at risk. You are at risk for STIs (including HIV) if:  You are sexually active with more than one person.  You don't use condoms every time.  You or a partner was diagnosed with a sexually transmitted infection.  If you are at risk for HIV, ask about PrEP medicine to prevent HIV.  Get tested for HIV at least once in your life, whether you are at risk for HIV or not.  Cancer screening tests  Cervical cancer screening: If you have a cervix, begin getting regular cervical cancer screening tests starting at age 21.  Breast cancer scan (mammogram): If you've ever had breasts, begin having regular mammograms starting at age 40. This is a scan to check for breast cancer.  Colon cancer screening: It is important to start screening for colon cancer at age 45.  Have a colonoscopy test every 10 years (or more often if you're at risk) Or, ask your provider about stool tests like a FIT test every year or Cologuard test every 3 years.  To learn more about your testing options, visit:   .  For help making a decision, visit:   https://bit.ly/yq32923.  Prostate cancer screening test: If you have a prostate, ask your care team if a prostate cancer screening test (PSA) at age 55 is right for you.  Lung cancer screening: If you are a current or former smoker ages 50 to 80, ask your care team if ongoing lung cancer screenings are right for you.  For informational purposes only. Not to replace the advice of your health care provider. Copyright   2023 [employer]. All rights reserved. Clinically reviewed by the [facility] Transitions Program. [employer] [id number] - REV 01/24.

## 2025-02-25 LAB
ALBUMIN SERPL BCG-MCNC: 4.2 G/DL (ref 3.5–5.2)
ALP SERPL-CCNC: 43 U/L (ref 40–150)
ALT SERPL W P-5'-P-CCNC: 15 U/L (ref 0–50)
ANION GAP SERPL CALCULATED.3IONS-SCNC: 9 MMOL/L (ref 7–15)
AST SERPL W P-5'-P-CCNC: 22 U/L (ref 0–45)
BILIRUB SERPL-MCNC: 0.6 MG/DL
BUN SERPL-MCNC: 13.6 MG/DL (ref 6–20)
CALCIUM SERPL-MCNC: 9.5 MG/DL (ref 8.8–10.4)
CHLORIDE SERPL-SCNC: 105 MMOL/L (ref 98–107)
CHOLEST SERPL-MCNC: 131 MG/DL
CREAT SERPL-MCNC: 0.86 MG/DL (ref 0.51–0.95)
EGFRCR SERPLBLD CKD-EPI 2021: 88 ML/MIN/1.73M2
FASTING STATUS PATIENT QL REPORTED: NO
FASTING STATUS PATIENT QL REPORTED: NO
GLUCOSE SERPL-MCNC: 93 MG/DL (ref 70–99)
HCO3 SERPL-SCNC: 25 MMOL/L (ref 22–29)
HDLC SERPL-MCNC: 58 MG/DL
LDLC SERPL CALC-MCNC: 62 MG/DL
NONHDLC SERPL-MCNC: 73 MG/DL
POTASSIUM SERPL-SCNC: 4.1 MMOL/L (ref 3.4–5.3)
PROT SERPL-MCNC: 7 G/DL (ref 6.4–8.3)
SODIUM SERPL-SCNC: 139 MMOL/L (ref 135–145)
TRIGL SERPL-MCNC: 53 MG/DL

## 2025-03-18 ENCOUNTER — MYC REFILL (OUTPATIENT)
Dept: SURGERY | Facility: CLINIC | Age: 40
End: 2025-03-18
Payer: COMMERCIAL

## 2025-03-18 DIAGNOSIS — E66.09 CLASS 1 OBESITY DUE TO EXCESS CALORIES WITH BODY MASS INDEX (BMI) OF 33.0 TO 33.9 IN ADULT, UNSPECIFIED WHETHER SERIOUS COMORBIDITY PRESENT: ICD-10-CM

## 2025-03-18 DIAGNOSIS — E66.811 CLASS 1 OBESITY DUE TO EXCESS CALORIES WITH BODY MASS INDEX (BMI) OF 33.0 TO 33.9 IN ADULT, UNSPECIFIED WHETHER SERIOUS COMORBIDITY PRESENT: ICD-10-CM

## 2025-05-25 DIAGNOSIS — K21.9 GASTROESOPHAGEAL REFLUX DISEASE WITHOUT ESOPHAGITIS: ICD-10-CM

## 2025-05-29 NOTE — TELEPHONE ENCOUNTER
Called patient and verified with her that she has about 3 months worth at home right now. She has not stopped taking this medication and stated it comes to home every once in a while but she will reach out to us when she is in need of her next refill.

## 2025-06-02 RX ORDER — OMEPRAZOLE 20 MG/1
20 CAPSULE, DELAYED RELEASE ORAL 2 TIMES DAILY
Qty: 180 CAPSULE | Refills: 2 | OUTPATIENT
Start: 2025-06-02

## 2025-06-04 ENCOUNTER — MYC MEDICAL ADVICE (OUTPATIENT)
Dept: SURGERY | Facility: CLINIC | Age: 40
End: 2025-06-04
Payer: COMMERCIAL

## 2025-06-04 DIAGNOSIS — E66.811 CLASS 1 OBESITY DUE TO EXCESS CALORIES WITH BODY MASS INDEX (BMI) OF 33.0 TO 33.9 IN ADULT, UNSPECIFIED WHETHER SERIOUS COMORBIDITY PRESENT: Primary | ICD-10-CM

## 2025-06-04 DIAGNOSIS — E66.09 CLASS 1 OBESITY DUE TO EXCESS CALORIES WITH BODY MASS INDEX (BMI) OF 33.0 TO 33.9 IN ADULT, UNSPECIFIED WHETHER SERIOUS COMORBIDITY PRESENT: Primary | ICD-10-CM

## 2025-07-14 ENCOUNTER — MYC REFILL (OUTPATIENT)
Dept: SURGERY | Facility: CLINIC | Age: 40
End: 2025-07-14
Payer: COMMERCIAL

## 2025-07-14 ENCOUNTER — MYC MEDICAL ADVICE (OUTPATIENT)
Dept: FAMILY MEDICINE | Facility: CLINIC | Age: 40
End: 2025-07-14
Payer: COMMERCIAL

## 2025-07-14 DIAGNOSIS — Z12.11 SCREEN FOR COLON CANCER: ICD-10-CM

## 2025-07-14 DIAGNOSIS — E66.09 CLASS 1 OBESITY DUE TO EXCESS CALORIES WITH BODY MASS INDEX (BMI) OF 33.0 TO 33.9 IN ADULT, UNSPECIFIED WHETHER SERIOUS COMORBIDITY PRESENT: ICD-10-CM

## 2025-07-14 DIAGNOSIS — Z83.719 FAMILY HISTORY OF COLONIC POLYPS: Primary | ICD-10-CM

## 2025-07-14 DIAGNOSIS — E66.811 CLASS 1 OBESITY DUE TO EXCESS CALORIES WITH BODY MASS INDEX (BMI) OF 33.0 TO 33.9 IN ADULT, UNSPECIFIED WHETHER SERIOUS COMORBIDITY PRESENT: ICD-10-CM

## 2025-08-16 ENCOUNTER — MYC REFILL (OUTPATIENT)
Dept: SURGERY | Facility: CLINIC | Age: 40
End: 2025-08-16
Payer: COMMERCIAL

## 2025-08-16 DIAGNOSIS — E66.811 CLASS 1 OBESITY DUE TO EXCESS CALORIES WITH BODY MASS INDEX (BMI) OF 33.0 TO 33.9 IN ADULT, UNSPECIFIED WHETHER SERIOUS COMORBIDITY PRESENT: ICD-10-CM

## 2025-08-16 DIAGNOSIS — E66.09 CLASS 1 OBESITY DUE TO EXCESS CALORIES WITH BODY MASS INDEX (BMI) OF 33.0 TO 33.9 IN ADULT, UNSPECIFIED WHETHER SERIOUS COMORBIDITY PRESENT: ICD-10-CM

## (undated) DEVICE — TUBING SUCTION 12"X1/4" N612

## (undated) DEVICE — TUBING SUCTION MEDI-VAC 1/4"X20' N620A

## (undated) DEVICE — SOL WATER IRRIG 500ML BOTTLE 2F7113

## (undated) DEVICE — ENDO BITE BLOCK ADULT OMNI-BLOC

## (undated) DEVICE — SYR 30ML SLIP TIP W/O NDL 302833

## (undated) DEVICE — GOWN IMPERVIOUS 2XL BLUE

## (undated) DEVICE — SUCTION MANIFOLD NEPTUNE 2 SYS 1 PORT 702-025-000

## (undated) DEVICE — KIT ENDO TURNOVER/PROCEDURE CARRY-ON 101822

## (undated) DEVICE — SUCTION CATH AIRLIFE TRI-FLO W/CONTROL PORT 14FR  T60C

## (undated) DEVICE — ENDO FORCEP BX CAPTURA PRO SPIKE G50696

## (undated) DEVICE — GLOVE EXAM NITRILE LG PF LATEX FREE 5064

## (undated) DEVICE — SPECIMEN CONTAINER 3OZ W/FORMALIN 59901

## (undated) RX ORDER — PROPOFOL 10 MG/ML
INJECTION, EMULSION INTRAVENOUS
Status: DISPENSED
Start: 2021-05-20

## (undated) RX ORDER — LIDOCAINE HYDROCHLORIDE 20 MG/ML
INJECTION, SOLUTION EPIDURAL; INFILTRATION; INTRACAUDAL; PERINEURAL
Status: DISPENSED
Start: 2021-05-20